# Patient Record
Sex: FEMALE | Race: WHITE | Employment: OTHER | URBAN - METROPOLITAN AREA
[De-identification: names, ages, dates, MRNs, and addresses within clinical notes are randomized per-mention and may not be internally consistent; named-entity substitution may affect disease eponyms.]

---

## 2017-05-25 ENCOUNTER — EMPLOYEE WELLNESS (OUTPATIENT)
Dept: OTHER | Age: 62
End: 2017-05-25

## 2017-05-25 LAB
CHOLESTEROL, TOTAL: 266 MG/DL (ref 0–199)
FASTING: YES
GLUCOSE BLD-MCNC: 91 MG/DL (ref 74–109)
HDLC SERPL-MCNC: 52 MG/DL (ref 40–90)
LDL CHOLESTEROL CALCULATED: 178 MG/DL
TRIGL SERPL-MCNC: 178 MG/DL (ref 0–199)

## 2017-07-18 ENCOUNTER — HOSPITAL ENCOUNTER (EMERGENCY)
Age: 62
Discharge: HOME | End: 2017-07-18
Attending: NURSE PRACTITIONER
Payer: COMMERCIAL

## 2017-07-18 VITALS
HEART RATE: 67 BPM | TEMPERATURE: 98.5 F | RESPIRATION RATE: 18 BRPM | SYSTOLIC BLOOD PRESSURE: 155 MMHG | DIASTOLIC BLOOD PRESSURE: 77 MMHG | OXYGEN SATURATION: 97 %

## 2017-07-18 DIAGNOSIS — J02.9 ACUTE PHARYNGITIS, UNSPECIFIED ETIOLOGY: Primary | ICD-10-CM

## 2017-07-18 LAB
GROUP A STREP CULTURE, REFLEX: NEGATIVE
REFLEX THROAT C + S: NORMAL

## 2017-07-18 PROCEDURE — 99213 OFFICE O/P EST LOW 20 MIN: CPT

## 2017-07-18 PROCEDURE — 87880 STREP A ASSAY W/OPTIC: CPT

## 2017-07-18 PROCEDURE — 99213 OFFICE O/P EST LOW 20 MIN: CPT | Performed by: NURSE PRACTITIONER

## 2017-07-18 PROCEDURE — 87070 CULTURE OTHR SPECIMN AEROBIC: CPT

## 2017-07-18 RX ORDER — AMOXICILLIN 500 MG/1
500 CAPSULE ORAL 3 TIMES DAILY
Qty: 30 CAPSULE | Refills: 0 | Status: SHIPPED | OUTPATIENT
Start: 2017-07-18 | End: 2017-07-28

## 2017-07-18 ASSESSMENT — ENCOUNTER SYMPTOMS
SORE THROAT: 1
WHEEZING: 0
COUGH: 0
SHORTNESS OF BREATH: 0

## 2017-07-18 ASSESSMENT — PAIN DESCRIPTION - LOCATION: LOCATION: THROAT

## 2017-07-18 ASSESSMENT — PAIN DESCRIPTION - PAIN TYPE: TYPE: ACUTE PAIN

## 2017-07-18 ASSESSMENT — PAIN SCALES - GENERAL: PAINLEVEL_OUTOF10: 5

## 2017-07-18 ASSESSMENT — PAIN DESCRIPTION - DESCRIPTORS: DESCRIPTORS: SORE

## 2017-07-19 ENCOUNTER — CARE COORDINATION (OUTPATIENT)
Dept: FAMILY MEDICINE CLINIC | Age: 62
End: 2017-07-19

## 2017-07-20 LAB — THROAT/NOSE CULTURE: NORMAL

## 2018-01-08 ENCOUNTER — ANESTHESIA (OUTPATIENT)
Dept: ENDOSCOPY | Age: 63
End: 2018-01-08
Payer: COMMERCIAL

## 2018-01-08 ENCOUNTER — ANESTHESIA EVENT (OUTPATIENT)
Dept: ENDOSCOPY | Age: 63
End: 2018-01-08
Payer: COMMERCIAL

## 2018-01-08 ENCOUNTER — HOSPITAL ENCOUNTER (OUTPATIENT)
Age: 63
Setting detail: OUTPATIENT SURGERY
Discharge: HOME | End: 2018-01-08
Attending: INTERNAL MEDICINE | Admitting: INTERNAL MEDICINE
Payer: COMMERCIAL

## 2018-01-08 VITALS
BODY MASS INDEX: 42.85 KG/M2 | WEIGHT: 273 LBS | RESPIRATION RATE: 18 BRPM | HEART RATE: 68 BPM | HEIGHT: 67 IN | DIASTOLIC BLOOD PRESSURE: 72 MMHG | SYSTOLIC BLOOD PRESSURE: 119 MMHG | OXYGEN SATURATION: 97 % | TEMPERATURE: 97.2 F

## 2018-01-08 VITALS — OXYGEN SATURATION: 96 % | SYSTOLIC BLOOD PRESSURE: 112 MMHG | DIASTOLIC BLOOD PRESSURE: 62 MMHG

## 2018-01-08 PROCEDURE — 2580000003 HC RX 258: Performed by: INTERNAL MEDICINE

## 2018-01-08 PROCEDURE — 7100000000 HC PACU RECOVERY - FIRST 15 MIN: Performed by: INTERNAL MEDICINE

## 2018-01-08 PROCEDURE — 88305 TISSUE EXAM BY PATHOLOGIST: CPT

## 2018-01-08 PROCEDURE — 6360000002 HC RX W HCPCS: Performed by: NURSE ANESTHETIST, CERTIFIED REGISTERED

## 2018-01-08 PROCEDURE — 2500000003 HC RX 250 WO HCPCS: Performed by: NURSE ANESTHETIST, CERTIFIED REGISTERED

## 2018-01-08 PROCEDURE — 3700000000 HC ANESTHESIA ATTENDED CARE: Performed by: INTERNAL MEDICINE

## 2018-01-08 PROCEDURE — 3609027000 HC COLONOSCOPY: Performed by: INTERNAL MEDICINE

## 2018-01-08 PROCEDURE — 3700000001 HC ADD 15 MINUTES (ANESTHESIA): Performed by: INTERNAL MEDICINE

## 2018-01-08 RX ORDER — SODIUM CHLORIDE 450 MG/100ML
INJECTION, SOLUTION INTRAVENOUS CONTINUOUS
Status: DISCONTINUED | OUTPATIENT
Start: 2018-01-08 | End: 2018-01-08 | Stop reason: ALTCHOICE

## 2018-01-08 RX ORDER — SODIUM CHLORIDE 450 MG/100ML
INJECTION, SOLUTION INTRAVENOUS CONTINUOUS
Status: DISCONTINUED | OUTPATIENT
Start: 2018-01-08 | End: 2018-01-08 | Stop reason: HOSPADM

## 2018-01-08 RX ORDER — 0.9 % SODIUM CHLORIDE 0.9 %
10 VIAL (ML) INJECTION PRN
Status: CANCELLED | OUTPATIENT
Start: 2018-01-08

## 2018-01-08 RX ORDER — LIDOCAINE HYDROCHLORIDE 20 MG/ML
INJECTION, SOLUTION INFILTRATION; PERINEURAL PRN
Status: DISCONTINUED | OUTPATIENT
Start: 2018-01-08 | End: 2018-01-08 | Stop reason: SDUPTHER

## 2018-01-08 RX ORDER — 0.9 % SODIUM CHLORIDE 0.9 %
10 VIAL (ML) INJECTION EVERY 12 HOURS SCHEDULED
Status: CANCELLED | OUTPATIENT
Start: 2018-01-08

## 2018-01-08 RX ORDER — PROPOFOL 10 MG/ML
INJECTION, EMULSION INTRAVENOUS PRN
Status: DISCONTINUED | OUTPATIENT
Start: 2018-01-08 | End: 2018-01-08 | Stop reason: SDUPTHER

## 2018-01-08 RX ADMIN — PROPOFOL 80 MG: 10 INJECTION, EMULSION INTRAVENOUS at 08:54

## 2018-01-08 RX ADMIN — PROPOFOL 30 MG: 10 INJECTION, EMULSION INTRAVENOUS at 08:58

## 2018-01-08 RX ADMIN — LIDOCAINE HYDROCHLORIDE 30 MG: 20 INJECTION, SOLUTION INFILTRATION; PERINEURAL at 08:50

## 2018-01-08 RX ADMIN — PROPOFOL 80 MG: 10 INJECTION, EMULSION INTRAVENOUS at 08:50

## 2018-01-08 RX ADMIN — PROPOFOL 30 MG: 10 INJECTION, EMULSION INTRAVENOUS at 09:02

## 2018-01-08 RX ADMIN — PROPOFOL 10 MG: 10 INJECTION, EMULSION INTRAVENOUS at 09:05

## 2018-01-08 RX ADMIN — SODIUM CHLORIDE: 4.5 INJECTION, SOLUTION INTRAVENOUS at 07:39

## 2018-01-08 ASSESSMENT — PAIN - FUNCTIONAL ASSESSMENT: PAIN_FUNCTIONAL_ASSESSMENT: 0-10

## 2018-01-08 NOTE — BRIEF OP NOTE
Brief Postoperative Note  ______________________________________________________________    Patient: Rommel Shabazz  YOB: 1955  MRN: [id number]  Date of Procedure: 1/8/2018    Pre-Op Diagnosis: hx colon polyp    Post-Op Diagnosis: Same       Procedure(s):  COLONOSCOPY    Anesthesia: Monitor Anesthesia Care    Surgeon(s):   Corinne Carlisle MD    Staff:  Scrub Person First: Benigno Hogan     Estimated Blood Loss: * No values recorded between 1/8/2018  8:46 AM and 1/8/2018  9:11 AM * None    Complications: None    Specimens:   ID Type Source Tests Collected by Time Destination   A :  Tissue Recto sigmoid SURGICAL PATHOLOGY Corinne Carlisle MD 1/8/2018 7279        Implants:  * No implants in log *      Drains:      Findings: above    Corinne Carlisle MD  Date: 1/8/2018  Time: 9:17 AM

## 2018-01-08 NOTE — OP NOTE
procedure was terminated. The patient tolerated the  procedure well, taken to the GI lab recovery area in stable condition. IMPRESSION:  1. Colonoscopy to cecum. Small polyps removed. 2.  Prep was good, visibility good, and greater than 6 minutes were taken  for withdrawal of the scope once sigmoid was reached. 3.  Pictures taken, pending. PLAN:  1. Await biopsy results. Will likely just need this in 5 years. 2.  Greater than 6 minutes were taken for withdrawal of the scope once  sigmoid was reached. 3.  No heavy lifting today. MISSAEL Stone M.D.    D: 01/08/2018 9:25:43       T: 01/08/2018 11:05:16     [id number]  Job#: [id number]     Doc#: [id number]    CC:  Priya Howard M.D.

## 2018-01-08 NOTE — OP NOTE
[]Other:    Lungs:  [x]Clear    []Other:    Abdomen: [x]Soft    []Other:    Mental Status: [x]Alert & Oriented  []Other:      VITAL SIGNS   Patient Vitals for the past 24 hrs:   BP Temp Temp src Pulse Resp SpO2 Height Weight   01/08/18 0729 119/71 97.2 °F (36.2 °C) Temporal 76 16 94 % 5' 7\" (1.702 m) 273 lb (123.8 kg)       PLANNED PROCEDURE   []EGD  [x]Colonoscopy []Flex Sigmoid  []ERCP []EUS   []Cystoscopy  [] CATH [] BRONCH   Consent: I have discussed with the patient and/or the patient representative the indication, alternatives, and the possible risks and/or complications of the planned procedure and the anesthesia methods. The patient and/or patient representative appear to understand and agree to proceed. SEDATION  Planned agent:[x]Midazolam []Meperidine [x]Sublimaze []Morphine  []Diazepam  []Other:     ASA Classification: Class 2 - A normal healthy patient with mild systemic disease    Airway Assessment: Mallampati Class II - (soft palate, fauces & uvula are visible)    Monitoring and Safety: The patient will be placed on a cardiac monitor and vital signs, pulse oximetry and level of consciousness will be continuously evaluated throughout the procedure. The patient will be closely monitored until recovery from the medications is complete and the patient has returned to baseline status. Respiratory therapy will be on standby during the procedure. [x]Pre-procedure diagnostic studies complete and results available. Comment:    [x]Previous sedation/anesthesia experiences assessed. Comment:    [x]The patient is an appropriate candidate to undergo the planned procedure sedation and anesthesia. (Refer to nursing sedation/analgesia documentation record)  [x]Formulation and discussion of sedation/procedure plan, risks, and expectations with patient and/or responsible adult completed. [x]Patient examined immediately prior to the procedure.  (Refer to nursing sedation/analgesia documentation record)    Crow Alba

## 2018-01-08 NOTE — ANESTHESIA PRE PROCEDURE
Department of Anesthesiology  Preprocedure Note       Name:  Marcial Mcdonald   Age:  63 y.o.  :  1955                                          MRN:  [id number]         Date:  2018      Surgeon: Pardeep Ponce): Elliot Ahumada, MD    Procedure: Procedure(s):  COLONOSCOPY    Medications prior to admission:   Prior to Admission medications    Medication Sig Start Date End Date Taking? Authorizing Provider   ibuprofen (ADVIL;MOTRIN) 800 MG tablet Take 1 tablet by mouth every 8 hours as needed for Pain for 30 doses. 13   Elio Chappell PA-C   sertraline (ZOLOFT) 100 MG tablet Take 100 mg by mouth daily. Historical Provider, MD   ValACYclovir HCl (VALTREX PO) Take  by mouth as needed. TAKES AS DIRECTED PRN     Historical Provider, MD       Current medications:    No current facility-administered medications for this encounter. Allergies:  No Known Allergies    Problem List:    Patient Active Problem List   Diagnosis Code    Heart palpitations R00.2    Obesity, mild E66.9    Depression F32.9    History of fatigue Z87.898    History of cold sores Z86.19    Vitamin D deficiency E55.9    Glucose intolerance (impaired glucose tolerance) R73.02       Past Medical History:        Diagnosis Date    Depression     Glucose intolerance (impaired glucose tolerance)     Heart palpitations     HX OF:    History of cold sores     History of fatigue     Hyperlipidemia     Obesity, mild     Vitamin D deficiency        Past Surgical History:        Procedure Laterality Date    APPENDECTOMY      COLONOSCOPY      TONSILLECTOMY      TRANSTHORACIC ECHOCARDIOGRAM  2011    EF 55-65%  NORMAL       Social History:    Social History   Substance Use Topics    Smoking status: Never Smoker    Smokeless tobacco: Not on file    Alcohol use Yes      Comment: SOCIAL                                Counseling given: Not Answered      Vital Signs (Current): There were no vitals filed for this visit.

## 2018-02-19 ENCOUNTER — TELEPHONE (OUTPATIENT)
Dept: PHARMACY | Facility: CLINIC | Age: 63
End: 2018-02-19

## 2018-02-19 NOTE — TELEPHONE ENCOUNTER
CLINICAL PHARMACY CONSULT: Prior Authorization Request    Referred to clinical pharmacy specialist from [address] -  identified with current non-formulary request for Myrbetriq.  formulary tier: 2, requires step therapy, trial of oxybutynin. Alternative(s):   Oxybutynin    Called physician office and informed of step therapy.   I note claim for oxybutynin in [employer].    ==============================================  For Pharmacy Admin Tracking Only    PHSO: Yes  Total # of Interventions Recommended: 1  - New Order #: 1 New Medication Order Reason(s): Cost Conversion  Recommended intervention potential cost savings: 3233.60  Accepted intervention potential cost savings: 3233.60  Total Interventions Accepted: 1  Time Spent (min): [address], [address] [place] [address]

## 2018-03-20 VITALS — WEIGHT: 263 LBS | BODY MASS INDEX: 39.99 KG/M2

## 2018-05-12 ENCOUNTER — EMPLOYEE WELLNESS (OUTPATIENT)
Dept: OTHER | Age: 63
End: 2018-05-12

## 2018-05-12 LAB
CHOLESTEROL, TOTAL: 290 MG/DL (ref 0–199)
FASTING: YES
GLUCOSE BLD-MCNC: 98 MG/DL (ref 74–109)
HDLC SERPL-MCNC: 59 MG/DL (ref 40–90)
LDL CHOLESTEROL CALCULATED: 200 MG/DL
TRIGL SERPL-MCNC: 157 MG/DL (ref 0–199)

## 2018-05-21 VITALS — WEIGHT: 280 LBS | BODY MASS INDEX: 43.85 KG/M2

## 2019-05-01 ENCOUNTER — OFFICE VISIT (OUTPATIENT)
Dept: FAMILY MEDICINE CLINIC | Age: 64
End: 2019-05-01
Payer: COMMERCIAL

## 2019-05-01 VITALS
RESPIRATION RATE: 16 BRPM | SYSTOLIC BLOOD PRESSURE: 114 MMHG | DIASTOLIC BLOOD PRESSURE: 86 MMHG | HEIGHT: 68 IN | HEART RATE: 68 BPM | TEMPERATURE: 98.1 F | WEIGHT: 279.8 LBS | BODY MASS INDEX: 42.41 KG/M2

## 2019-05-01 DIAGNOSIS — H69.83 DYSFUNCTION OF BOTH EUSTACHIAN TUBES: Primary | ICD-10-CM

## 2019-05-01 DIAGNOSIS — R42 DIZZINESS: ICD-10-CM

## 2019-05-01 PROCEDURE — 99213 OFFICE O/P EST LOW 20 MIN: CPT | Performed by: FAMILY MEDICINE

## 2019-05-01 RX ORDER — CEPHALEXIN 500 MG/1
500 CAPSULE ORAL 2 TIMES DAILY
Qty: 20 CAPSULE | Refills: 0 | Status: SHIPPED | OUTPATIENT
Start: 2019-05-01 | End: 2019-05-11

## 2019-05-01 RX ORDER — MECLIZINE HYDROCHLORIDE 25 MG/1
25 TABLET ORAL 3 TIMES DAILY PRN
Qty: 30 TABLET | Refills: 0 | Status: SHIPPED | OUTPATIENT
Start: 2019-05-01 | End: 2019-05-11

## 2019-05-01 ASSESSMENT — ENCOUNTER SYMPTOMS
SORE THROAT: 0
CHANGE IN BOWEL HABIT: 0
ABDOMINAL PAIN: 0
EYE PAIN: 0
NAUSEA: 1
VOMITING: 0
CONSTIPATION: 0
SWOLLEN GLANDS: 0
CHEST TIGHTNESS: 0
COUGH: 0
RHINORRHEA: 0
WHEEZING: 0
VISUAL CHANGE: 0
SHORTNESS OF BREATH: 0
BLOOD IN STOOL: 0
DIARRHEA: 0
BACK PAIN: 0

## 2019-05-01 NOTE — PROGRESS NOTES
to light. EOM are normal.   Neck: Neck supple. No thyromegaly present. Cardiovascular: Normal rate, regular rhythm and normal heart sounds. Pulmonary/Chest: Breath sounds normal. She has no wheezes. She has no rales. Abdominal: Soft. Bowel sounds are normal. There is no tenderness. There is no rebound and no guarding. Musculoskeletal: Normal range of motion. She exhibits no edema. Lymphadenopathy:     She has no cervical adenopathy. Neurological: She is alert and oriented to person, place, and time. She has normal reflexes. No cranial nerve deficit. Skin: Skin is warm and dry. No rash noted. Psychiatric: She has a normal mood and affect. Nursing note and vitals reviewed.       Assessment:      Eustachian tube dysfunction      Plan:      Keflex 500 mg BID x 10 days  Antivert 25 mg for dizziness  Work slip        Josette Sheehan MD

## 2019-05-01 NOTE — LETTER
[address] [address]  Phone: [phone]  Fax: [phone]    Julian Bhatt MD        May 1, 2019     Patient: Rebecca Roberto   YOB: 1955   Date of Visit: 5/1/2019       To Whom It May Concern: It is my medical opinion that Rebecca Roberto may return to work on 05/07/2019. Off work 05/01/2019 and 05/02/2019 for eustanian tube dysfunction and dizziness. If you have any questions or concerns, please don't hesitate to call.     Sincerely,        Julian Bhatt MD

## 2019-05-01 NOTE — PATIENT INSTRUCTIONS
Patient Education        Dizziness: Care Instructions  Your Care Instructions  Dizziness is the feeling of unsteadiness or fuzziness in your head. It is different than having vertigo, which is a feeling that the room is spinning or that you are moving or falling. It is also different from lightheadedness, which is the feeling that you are about to faint. It can be hard to know what causes dizziness. Some people feel dizzy when they have migraine headaches. Sometimes bouts of flu can make you feel dizzy. Some medical conditions, such as heart problems or high blood pressure, can make you feel dizzy. Many medicines can cause dizziness, including medicines for high blood pressure, pain, or anxiety. If a medicine causes your symptoms, your doctor may recommend that you stop or change the medicine. If it is a problem with your heart, you may need medicine to help your heart work better. If there is no clear reason for your symptoms, your doctor may suggest watching and waiting for a while to see if the dizziness goes away on its own. Follow-up care is a key part of your treatment and safety. Be sure to make and go to all appointments, and call your doctor if you are having problems. It's also a good idea to know your test results and keep a list of the medicines you take. How can you care for yourself at home? · If your doctor recommends or prescribes medicine, take it exactly as directed. Call your doctor if you think you are having a problem with your medicine. · Do not drive while you feel dizzy. · Try to prevent falls. Steps you can take include:  ? Using nonskid mats, adding grab bars near the tub, and using night-lights. ? Clearing your home so that walkways are free of anything you might trip on.  ? Letting family and friends know that you have been feeling dizzy. This will help them know how to help you. When should you call for help? Call 911 anytime you think you may need emergency care.  For example, call if:    · You passed out (lost consciousness).     · You have dizziness along with symptoms of a heart attack. These may include:  ? Chest pain or pressure, or a strange feeling in the chest.  ? Sweating. ? Shortness of breath. ? Nausea or vomiting. ? Pain, pressure, or a strange feeling in the back, neck, jaw, or upper belly or in one or both shoulders or arms. ? Lightheadedness or sudden weakness. ? A fast or irregular heartbeat.     · You have symptoms of a stroke. These may include:  ? Sudden numbness, tingling, weakness, or loss of movement in your face, arm, or leg, especially on only one side of your body. ? Sudden vision changes. ? Sudden trouble speaking. ? Sudden confusion or trouble understanding simple statements. ? Sudden problems with walking or balance. ? A sudden, severe headache that is different from past headaches.    Call your doctor now or seek immediate medical care if:    · You feel dizzy and have a fever, headache, or ringing in your ears.     · You have new or increased nausea and vomiting.     · Your dizziness does not go away or comes back.    Watch closely for changes in your health, and be sure to contact your doctor if:    · You do not get better as expected. Where can you learn more? Go to [email or web address]://Crono.Chill.com. org and sign in to your [employer] account. Enter O677 in the [employer] box to learn more about \"Dizziness: Care Instructions. \"     If you do not have an account, please click on the \"Sign Up Now\" link. Current as of: September 23, 2018  Content Version: 11.9  © 1227-4423 [employer]. Care instructions adapted under license by [facility] ([facility]). If you have questions about a medical condition or this instruction, always ask your healthcare professional. [place] [address] any warranty or liability for your use of this information.          Patient Education        Vertigo: Care Instructions  Your Care Instructions    Vertigo is the feeling that you or your surroundings are moving when there is no actual movement. It is often described as a feeling of spinning, whirling, falling, or tilting. Vertigo may make you vomit or feel nauseated. You may have trouble standing or walking and may lose your balance. Vertigo is often related to an inner ear problem, but it can have other more serious causes. If vertigo continues, you may need more tests to find its cause. Follow-up care is a key part of your treatment and safety. Be sure to make and go to all appointments, and call your doctor if you are having problems. It's also a good idea to know your test results and keep a list of the medicines you take. How can you care for yourself at home? · Do not lie flat on your back. Prop yourself up slightly. This may reduce the spinning feeling. Keep your eyes open. · Move slowly so that you do not fall. · If your doctor recommends medicine, take it exactly as directed. · Do not drive while you are having vertigo. Certain exercises, called Jenkins-Daroff exercises, can help decrease vertigo. To do Jenkins-Daroff exercises:  · Sit on the edge of a bed or sofa and quickly lie down on the side that causes the worst vertigo. Lie on your side with your ear down. · Stay in this position for at least 30 seconds or until the vertigo goes away. · Sit up. If this causes vertigo, wait for it to stop. · Repeat the procedure on the other side. · Repeat this 10 times. Do these exercises 2 times a day until the vertigo is gone. When should you call for help? Call 911 anytime you think you may need emergency care. For example, call if:    · You passed out (lost consciousness).     · You have symptoms of a stroke. These may include:  ? Sudden numbness, tingling, weakness, or loss of movement in your face, arm, or leg, especially on only one side of your body. ? Sudden vision changes. ? Sudden trouble speaking.   ? Sudden confusion or trouble understanding simple statements. ? Sudden problems with walking or balance. ? A sudden, severe headache that is different from past headaches.    Call your doctor now or seek immediate medical care if:    · Vertigo occurs with a fever, a headache, or ringing in your ears.     · You have new or increased nausea and vomiting.    Watch closely for changes in your health, and be sure to contact your doctor if:    · Vertigo gets worse or happens more often.     · Vertigo has not gotten better after 2 weeks. Where can you learn more? Go to https://SopogypePoachableeb.Provenance. org and sign in to your [employer] account. Enter X724 in the [employer] box to learn more about \"Vertigo: Care Instructions. \"     If you do not have an account, please click on the \"Sign Up Now\" link. Current as of: March 27, 2018  Content Version: 11.9  © 4165-6112 lifeaction games, Incorporated. Care instructions adapted under license by [facility] ([facility]). If you have questions about a medical condition or this instruction, always ask your healthcare professional. [address] any warranty or liability for your use of this information.

## 2019-05-02 ENCOUNTER — TELEPHONE (OUTPATIENT)
Dept: FAMILY MEDICINE CLINIC | Age: 64
End: 2019-05-02

## 2019-05-02 DIAGNOSIS — R42 DIZZINESS: Primary | ICD-10-CM

## 2019-05-02 NOTE — TELEPHONE ENCOUNTER
Patient states that the dizziness is not getting any better and her mother is coming home from the hospital next week and she wants to be able to help her. She would like to go ahead and be set up with vestibular therapy at [facility].   Please call her back at [phone]

## 2019-05-06 ENCOUNTER — HOSPITAL ENCOUNTER (OUTPATIENT)
Dept: PHYSICAL THERAPY | Age: 64
Setting detail: THERAPIES SERIES
Discharge: HOME | End: 2019-05-06
Payer: COMMERCIAL

## 2019-05-06 PROCEDURE — 97161 PT EVAL LOW COMPLEX 20 MIN: CPT

## 2019-05-06 PROCEDURE — 95992 CANALITH REPOSITIONING PROC: CPT

## 2019-05-06 NOTE — PROGRESS NOTES
** PLEASE SIGN, DATE AND TIME CERTIFICATION BELOW AND RETURN TO [facility] OUTPATIENT REHABILITATION (FAX #: [phone]). ATTEST/CO-SIGN IF ACCESSING VIA [employer]. THANK YOU.**    I certify that I have examined the patient below and determined that Physical Medicine and Rehabilitation service is necessary and that I approve the established plan of care for up to 90 days or as specifically noted. Attestation, signature or co-signature of physician indicates approval of certification requirements.    ________________________ ____________ __________  Physician Signature   Date   Time       [address]     Time In: 0930  Time Out: 1005  Minutes: 35    Date: 2019  Patient Name: Bryan Strauss,  Gender:  female        CSN: [id number]   : 1955  (66 y.o.)        Referring Practitioner: Elaine Horne MD      Diagnosis: Dizziness R42  Treatment Diagnosis: BPPV          General:  PT Visit Information  Onset Date: 19  PT Insurance Information: Medical [place]- no visit limit, aquatics and modalities covered  Total # of Visits to Date: 1  Plan of Care/Certification Expiration Date: 19       See Medical History Questionnaire for information related to allergies and medications. Subjective:  Chart Reviewed: Yes  Patient assessed for rehabilitation services?: Yes  Comments: No follow up scheduled with Dr. Silvana Posadas. Other (Comment): Vestibular therapy referral     Subjective: Pt reports 8-9 years ago, she slipped on ice and hit head causing vertigo, and went through about 12 sessions of vestibular rehab. Last week Tuesday, she got up out of bed and felt dizzy and had to hang onto the wall to walk. Dizziness is better but still has a \"fussiness\" feeling, and off balance. Pt was prescribed Keflex but that isn't helping with symptoms. Dizziness occurs with quick movements, looking up and bending over.  Pt denies neck pain, recent ear or sinus infections. Pain:  Patient Currently in Pain: No        Social/Functional History:    Lives With: Spouse  Type of Home: House   IADL Comments: Patients mother is ill and coming home from rehab with hospice, so battling emotional stress. 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 VOR normal. Oculomotor exma normal.           Overall Sensation Status: WNL           Rolling to Left: Independent  Rolling to Right: Independent  Supine to Sit: Independent  Sit to Supine: Independent                Transfers  Sit to Stand: Modified independent  Stand to sit: Modified independent            Ambulation 1  Surface: carpet  Device: No Device  Assistance: Independent  Quality of Gait: straight path, steady, equal step length  Distance: 40ft  Comments: Walked 20ft with horizontal head turns with mild scissoring and path deviation, 20ft looking up/down with less scissoring but c/o dizziness looking up        Balance  Single Leg Stance R Leg: (x8 sec with mild sway)  Single Leg Stance L Leg: (x7 sec with mild sway)           Exercises  Exercise 1: [place] Hallpike: positive left for pt report of dizziness, no nystagmus, intensity 3/5; positive to right for report of dizziness with torsional nystagmus, intensity 4/5  Exercise 2: Roll test: negative to left; positive to right with mild nystagmus  Exercise 3: Treated with Epley maneuever for right posterior canalithiasis.  Rechecked [employer] and roll test to right post treatment and no symptoms. Exercise 4: Educated pt on 24 hour vestibular precautions. Dizziness Handicap Inventory   1. Does looking up increase your problem? 4 - Always   2. Because of your problem, do you feel frustrated? 4 - Always   3. Because of your problem, do you restrict your travel for business or pleasure? 2 - Sometimes   4. Does walking down the aisle of a supermarket increase your problem? 0 - No   5. Because of your problem, do you have difficulty getting into or out of bed? 0 - No   6. Does your problem significantly restrict your participation in social activities, such as going out to dinner, going to movies, dancing or to parties? 0 - No   7. Because of your problem, do you have difficulty reading? 0 - No   8. Does performing more ambitious activities like sports, dancing, and household chores, such as sweeping or putting dishes away increase your problem? 4 - Always   9. Because of your problem, are you afraid to leave your home without having someone accompany you? 0 - No   10. Because of your problem, have you been embarrassed in front of others? 0 - No   11. Do quick head movements of your head increase your problem? 4 - Always   12. Because of your problem, do you avoid heights? 0 - No   13. Does turning over in bed increase your problem? 2 - Sometimes   14. Because of your problem, is it difficult for you to do strenuous housework or yard work? 0 - No   15. Because of your problem, are you afraid people may think that you are intoxicated? 0 - No   16. Because of your problem, is it difficult for you to go for a walk by yourself? 0 - No   17. Does walking down a sidewalk increase your problem? 0 - No   18. Because of your problem, is it difficult for you to concentrate? 2 - Sometimes   19. Because of your problem, is it difficult for you to walk around your house in the dark? 0 - No   20. Because of your problem, are you afraid to stay home alone? 0 - No   21.  Because of your problem, do you feel expectations for measurable functional outcomes. Evaluation Complexity: Based on the findings of patient history, examination, clinical presentation, and decision making during this evaluation, the evaluation of [facility]  is of low complexity. Goals:  Patient goals : Get rid of dizziness    Short term goals  Time Frame for Short term goals: see LTGs d/t short expected plan duration    Long term goals  Time Frame for Long term goals : 6 weeks  Long term goal 1: Pt will have negative [place] Hallpike and roll test B to get out of bed without dizziness. Long term goal 2: Pt will decrease Dizziness Handicap inventory score from 30/100 to 10/100. Long term goal 3: Pt will reports 90% resolution of dizziness and fussiness to tolerate quick head movements, looking up and bending over for functional tasks.      Erik Clayton, PT

## 2019-05-08 ENCOUNTER — HOSPITAL ENCOUNTER (OUTPATIENT)
Dept: PHYSICAL THERAPY | Age: 64
Setting detail: THERAPIES SERIES
Discharge: HOME | End: 2019-05-08
Payer: COMMERCIAL

## 2019-05-08 PROCEDURE — 95992 CANALITH REPOSITIONING PROC: CPT

## 2019-05-08 NOTE — PROGRESS NOTES
[facility]     Time In: 908  Time Out: 1963  Minutes: 8    Date: 2019  Patient Name: Jaida Nguyen,  Gender:  female        CSN: [id number]   : 1955  (60 y.o.)       Referring Practitioner: Hitesh Patel MD      Diagnosis: Dizziness R42  Treatment Diagnosis: BPPV           General:  PT Visit Information  Onset Date: 19  PT Insurance Information: Medical [place]- no visit limit, aquatics and modalities covered  Total # of Visits to Date: 2  Plan of Care/Certification Expiration Date: 19               Subjective:  Chart Reviewed: Yes  Comments: No follow up scheduled with Dr. Muna Cleary. Other (Comment): Vestibular therapy referral     Subjective: Pt reports feeling nauseous after treatment Monday, but yesterday felt better and today only c/o a little fussiness. Pain:  Patient Currently in Pain: No         Objective         Exercises  Exercise 1: Theo Hallpike: positive to right with torsional nystagmus lasting 15-20 sec  Exercise 3: Treated with Epley maneuever for right posterior canalithiasis, holding 1 min after resolution of symptoms -mild dizziness rolling to left and upon sitting up  Exercise 4: Reviewed 24 hour vestibular precautions. Activity Tolerance:  Activity Tolerance: Patient Tolerated treatment well    Assessment: Body structures, Functions, Activity limitations: Vestibular Impairment  Assessment: Theo Hallpike positive to right again and treated with Epley maneuever. Significant torsional nystagmus noted with Sandrea Marts. Nystagmus lasted shorted duration with 2nd Sandrea Marts- had to perform d/t fire drill interrupting treatment. Prognosis: Good       Patient Education:  Patient Education: 24 hour vestibular precautions and monitor symptoms.      Plan:  Times per week: 2x  Plan weeks: 6 weeks  Specific instructions for Next Treatment: reassess BPPV, treat appropriately  Current Treatment Recommendations: Vestibular Rehab, Home Exercise Program, Patient/Caregiver Education & Training  Plan Comment: Continue per current POC. Goals:  Patient goals : Get rid of dizziness    Short term goals  Time Frame for Short term goals: see LTGs d/t short expected plan duration    Long term goals  Time Frame for Long term goals : 6 weeks  Long term goal 1: Pt will have negative [place] Hallpike and roll test B to get out of bed without dizziness. Long term goal 2: Pt will decrease Dizziness Handicap inventory score from 30/100 to 10/100. Long term goal 3: Pt will reports 90% resolution of dizziness and fussiness to tolerate quick head movements, looking up and bending over for functional tasks.      Zoila Patel, PT

## 2019-05-13 ENCOUNTER — HOSPITAL ENCOUNTER (OUTPATIENT)
Dept: PHYSICAL THERAPY | Age: 64
Setting detail: THERAPIES SERIES
Discharge: HOME | End: 2019-05-13
Payer: COMMERCIAL

## 2019-05-13 PROCEDURE — 95992 CANALITH REPOSITIONING PROC: CPT

## 2019-05-13 NOTE — PROGRESS NOTES
[place]     Time In: 2432  Time Out: 8448  Minutes: 10    Date: 2019  Patient Name: Rasheed Crenshaw,  Gender:  female        CSN: [id number]   : 1955  (60 y.o.)       Referring Practitioner: Anastacia Araiza MD      Diagnosis: Dizziness R42  Treatment Diagnosis: BPPV      General:  PT Visit Information  Onset Date: 19  PT Insurance Information: Medical [place]- no visit limit, aquatics and modalities covered  Total # of Visits to Date: 3  Plan of Care/Certification Expiration Date: 19               Subjective:  Chart Reviewed: Yes  Comments: No follow up scheduled with Dr. Kimberli Diamond. Other (Comment): Vestibular therapy referral     Subjective: Pt reports feeling good yesterday without dizziness. Today pt c/o fussiness in sinus region but no real dizziness. Pt reports symptoms better if she log rolls to get out of bed. Pain:  Patient Currently in Pain: No         Objective         Exercises  Exercise 1: Theo Hallpike: positive to right with slow torsional nystagmus lasting 15-20 sec  Exercise 3: Treated with Epley maneuever for right posterior canalithiasis, holding 1 min after resolution of symptoms -mild dizziness rolling to left  Exercise 4: Reviewed 24 hour vestibular precautions. Activity Tolerance:  Activity Tolerance: Patient Tolerated treatment well    Assessment: Body structures, Functions, Activity limitations: Vestibular Impairment  Assessment: [place] Hallpike positive to right again, but less intense, so treated with Epley manuever with good tolerance and only mild dizziness rolling to left, none upon sitting up. Prognosis: Good       Patient Education:  Patient Education: 24 hour vestibular precautions and monitor symptoms.           Plan:  Times per week: 2x  Plan weeks: 6 weeks  Specific instructions for Next Treatment: reassess BPPV, treat appropriately  Current Treatment Recommendations: Vestibular Rehab, Home Exercise Program, Patient/Caregiver Education & Training  Plan Comment: Continue per current POC. Goals:  Patient goals : Get rid of dizziness    Short term goals  Time Frame for Short term goals: see LTGs d/t short expected plan duration    Long term goals  Time Frame for Long term goals : 6 weeks  Long term goal 1: Pt will have negative Theo Hallpike and roll test B to get out of bed without dizziness. Long term goal 2: Pt will decrease Dizziness Handicap inventory score from 30/100 to 10/100. Long term goal 3: Pt will reports 90% resolution of dizziness and fussiness to tolerate quick head movements, looking up and bending over for functional tasks.      Alexandre Shane, PT

## 2019-05-16 ENCOUNTER — HOSPITAL ENCOUNTER (OUTPATIENT)
Dept: PHYSICAL THERAPY | Age: 64
Setting detail: THERAPIES SERIES
Discharge: HOME | End: 2019-05-16
Payer: COMMERCIAL

## 2019-05-16 PROCEDURE — 95992 CANALITH REPOSITIONING PROC: CPT

## 2019-05-16 NOTE — PROGRESS NOTES
[place]     Time In: 0900  Time Out: 8985  Minutes: 12    Date: 2019  Patient Name: Bryan Strauss,  Gender:  female        CSN: [id number]   : 1955  (60 y.o.)       Referring Practitioner: Elaine Horne MD      Diagnosis: Dizziness R42  Treatment Diagnosis: BPPV           General:  PT Visit Information  Onset Date: 19  PT Insurance Information: Medical [place]- no visit limit, aquatics and modalities covered  Total # of Visits to Date: 4  Plan of Care/Certification Expiration Date: 19               Subjective:  Chart Reviewed: Yes  Comments: No follow up scheduled with Dr. Silvana Posadas. Other (Comment): Vestibular therapy referral     Subjective: Pt reports she felt good yesterday and when she got up this morning. However, she bent over to get dressed and had to catch herself and is dizzy now. Pain:  Patient Currently in Pain: No         Objective         Exercises  Exercise 1: Theo Hallpike: positive to right for report of dizziness, no nystagmus, negative to left  Exercise 2: Roll test: negative to right; positive to left for report of dizziness but less intense than right [employer]. Exercise 3: Treated with Epley maneuever for right posterior canalithiasis, holding 1 min after resolution of symptoms -mild dizziness rolling to left  Exercise 4: Reviewed 24 hour vestibular precautions. Activity Tolerance:  Activity Tolerance: Patient Tolerated treatment well    Assessment: Body structures, Functions, Activity limitations: Vestibular Impairment  Assessment: Pt continues to have positive right [employer], with a positive left roll test but less tense. Treated with Epley maneuver for right posterior canalithiasis. No nystagmus noted during testing today. Prognosis: Good       Patient Education:  Patient Education: 24 hour vestibular precautions and monitor symptoms. Plan:  Times per week: 2x  Plan weeks: 6 weeks  Specific instructions for Next Treatment: reassess BPPV, treat appropriately  Current Treatment Recommendations: Vestibular Rehab, Home Exercise Program, Patient/Caregiver Education & Training  Plan Comment: Continue per current POC. Goals:  Patient goals : Get rid of dizziness    Short term goals  Time Frame for Short term goals: see LTGs d/t short expected plan duration    Long term goals  Time Frame for Long term goals : 6 weeks  Long term goal 1: Pt will have negative [place] Hallpike and roll test B to get out of bed without dizziness. Long term goal 2: Pt will decrease Dizziness Handicap inventory score from 30/100 to 10/100. Long term goal 3: Pt will reports 90% resolution of dizziness and fussiness to tolerate quick head movements, looking up and bending over for functional tasks.      Luzmaria Faria, PT

## 2019-05-17 ENCOUNTER — APPOINTMENT (OUTPATIENT)
Dept: PHYSICAL THERAPY | Age: 64
End: 2019-05-17
Payer: COMMERCIAL

## 2019-05-17 NOTE — PROGRESS NOTES
[address]  PHYSICAL THERAPY MISSED TREATMENT NOTE  OUTPATIENT REHABILITATION    Date: 2019  Patient Name: Emanuel Victoria        MRN: [id number]   : 1955  (66 y.o.)  Gender: female   Referring Practitioner: Francisco Gallardo MD  Diagnosis: Dizziness R42    PT Visit Information  Canceled Appointment: 1    REASON FOR MISSED TREATMENT:  Cancelled d/t water covering roadways.       Selena Wagner DPT, #[id number]

## 2019-05-22 ENCOUNTER — HOSPITAL ENCOUNTER (OUTPATIENT)
Dept: PHYSICAL THERAPY | Age: 64
Setting detail: THERAPIES SERIES
Discharge: HOME | End: 2019-05-22
Payer: COMMERCIAL

## 2019-05-24 ENCOUNTER — EMPLOYEE WELLNESS (OUTPATIENT)
Dept: OTHER | Age: 64
End: 2019-05-24

## 2019-05-24 LAB
CHOLESTEROL, TOTAL: 309 MG/DL (ref 0–199)
FASTING: YES
GLUCOSE BLD-MCNC: 95 MG/DL (ref 74–109)
HDLC SERPL-MCNC: 58 MG/DL (ref 40–90)
LDL CHOLESTEROL CALCULATED: 222 MG/DL
TRIGL SERPL-MCNC: 143 MG/DL (ref 0–199)

## 2019-05-28 VITALS — WEIGHT: 278 LBS | BODY MASS INDEX: 42.27 KG/M2

## 2019-06-04 NOTE — DISCHARGE SUMMARY
[facility]    Patient Name: Rodrigo Shultz        CSN: [id number]   YOB: 1955  Gender: female  Referring Practitioner: Jen Morales MD  Diagnosis: Dizziness R42    Patient is discharged from Physical Therapy services at this time. See last note for details related to results of therapy and goal achievement. Reason for discharge:  Pt was placed on hold for 2 weeks to monitor vertigo. Upon discussion with patient, vertigo is resolved and no further PT warranted. Pt discharged from PT on 6/4/19 at 0851.        Hazel Crawley DPT, #[id number]

## 2019-07-17 ENCOUNTER — HOSPITAL ENCOUNTER (OUTPATIENT)
Dept: WOMENS IMAGING | Age: 64
Discharge: HOME | End: 2019-07-17
Payer: COMMERCIAL

## 2019-07-17 DIAGNOSIS — Z12.31 VISIT FOR SCREENING MAMMOGRAM: ICD-10-CM

## 2019-07-17 PROCEDURE — 77063 BREAST TOMOSYNTHESIS BI: CPT

## 2019-08-14 ENCOUNTER — HOSPITAL ENCOUNTER (EMERGENCY)
Age: 64
Discharge: HOME | End: 2019-08-14
Attending: EMERGENCY MEDICINE
Payer: COMMERCIAL

## 2019-08-14 ENCOUNTER — NURSE TRIAGE (OUTPATIENT)
Dept: OTHER | Facility: CLINIC | Age: 64
End: 2019-08-14

## 2019-08-14 ENCOUNTER — APPOINTMENT (OUTPATIENT)
Dept: GENERAL RADIOLOGY | Age: 64
End: 2019-08-14
Payer: COMMERCIAL

## 2019-08-14 ENCOUNTER — APPOINTMENT (OUTPATIENT)
Dept: CT IMAGING | Age: 64
End: 2019-08-14
Payer: COMMERCIAL

## 2019-08-14 VITALS
SYSTOLIC BLOOD PRESSURE: 148 MMHG | HEART RATE: 70 BPM | RESPIRATION RATE: 17 BRPM | OXYGEN SATURATION: 97 % | TEMPERATURE: 98.1 F | HEIGHT: 67 IN | BODY MASS INDEX: 43.95 KG/M2 | WEIGHT: 280 LBS | DIASTOLIC BLOOD PRESSURE: 87 MMHG

## 2019-08-14 DIAGNOSIS — I10 PRIMARY HYPERTENSION: Primary | ICD-10-CM

## 2019-08-14 LAB
ALBUMIN SERPL-MCNC: 4.5 G/DL (ref 3.5–5.1)
ALP BLD-CCNC: 75 U/L (ref 38–126)
ALT SERPL-CCNC: 15 U/L (ref 11–66)
ANION GAP SERPL CALCULATED.3IONS-SCNC: 16 MEQ/L (ref 8–16)
AST SERPL-CCNC: 16 U/L (ref 5–40)
BACTERIA: ABNORMAL /HPF
BASOPHILS # BLD: 0.4 %
BASOPHILS ABSOLUTE: 0 THOU/MM3 (ref 0–0.1)
BILIRUB SERPL-MCNC: 0.3 MG/DL (ref 0.3–1.2)
BILIRUBIN URINE: NEGATIVE
BLOOD, URINE: ABNORMAL
BUN BLDV-MCNC: 19 MG/DL (ref 7–22)
CALCIUM SERPL-MCNC: 9.6 MG/DL (ref 8.5–10.5)
CASTS 2: ABNORMAL /LPF
CASTS UA: ABNORMAL /LPF
CHARACTER, URINE: CLEAR
CHLORIDE BLD-SCNC: 98 MEQ/L (ref 98–111)
CO2: 25 MEQ/L (ref 23–33)
COLOR: YELLOW
CREAT SERPL-MCNC: 0.8 MG/DL (ref 0.4–1.2)
CRYSTALS, UA: ABNORMAL
EKG ATRIAL RATE: 74 BPM
EKG P AXIS: 49 DEGREES
EKG P-R INTERVAL: 172 MS
EKG Q-T INTERVAL: 396 MS
EKG QRS DURATION: 80 MS
EKG QTC CALCULATION (BAZETT): 439 MS
EKG R AXIS: -3 DEGREES
EKG T AXIS: 23 DEGREES
EKG VENTRICULAR RATE: 74 BPM
EOSINOPHIL # BLD: 0.8 %
EOSINOPHILS ABSOLUTE: 0.1 THOU/MM3 (ref 0–0.4)
EPITHELIAL CELLS, UA: ABNORMAL /HPF
ERYTHROCYTE [DISTWIDTH] IN BLOOD BY AUTOMATED COUNT: 11.9 % (ref 11.5–14.5)
ERYTHROCYTE [DISTWIDTH] IN BLOOD BY AUTOMATED COUNT: 41.6 FL (ref 35–45)
GFR SERPL CREATININE-BSD FRML MDRD: 73 ML/MIN/1.73M2
GLUCOSE BLD-MCNC: 102 MG/DL (ref 70–108)
GLUCOSE URINE: NEGATIVE MG/DL
HCT VFR BLD CALC: 43.2 % (ref 37–47)
HEMOGLOBIN: 14.1 GM/DL (ref 12–16)
IMMATURE GRANS (ABS): 0.01 THOU/MM3 (ref 0–0.07)
IMMATURE GRANULOCYTES: 0 %
KETONES, URINE: NEGATIVE
LEUKOCYTE ESTERASE, URINE: ABNORMAL
LYMPHOCYTES # BLD: 20.1 %
LYMPHOCYTES ABSOLUTE: 1.5 THOU/MM3 (ref 1–4.8)
MCH RBC QN AUTO: 31.4 PG (ref 26–33)
MCHC RBC AUTO-ENTMCNC: 32.6 GM/DL (ref 32.2–35.5)
MCV RBC AUTO: 96.2 FL (ref 81–99)
MISCELLANEOUS 2: ABNORMAL
MONOCYTES # BLD: 7.7 %
MONOCYTES ABSOLUTE: 0.6 THOU/MM3 (ref 0.4–1.3)
NITRITE, URINE: NEGATIVE
NUCLEATED RED BLOOD CELLS: 0 /100 WBC
OSMOLALITY CALCULATION: 280 MOSMOL/KG (ref 275–300)
PH UA: 5.5 (ref 5–9)
PLATELET # BLD: 251 THOU/MM3 (ref 130–400)
PMV BLD AUTO: 9.7 FL (ref 9.4–12.4)
POTASSIUM SERPL-SCNC: 3.9 MEQ/L (ref 3.5–5.2)
PRO-BNP: 63.9 PG/ML (ref 0–900)
PROTEIN UA: NEGATIVE
RBC # BLD: 4.49 MILL/MM3 (ref 4.2–5.4)
RBC URINE: ABNORMAL /HPF
RENAL EPITHELIAL, UA: ABNORMAL
SEG NEUTROPHILS: 70.9 %
SEGMENTED NEUTROPHILS ABSOLUTE COUNT: 5.3 THOU/MM3 (ref 1.8–7.7)
SODIUM BLD-SCNC: 139 MEQ/L (ref 135–145)
SPECIFIC GRAVITY, URINE: 1.02 (ref 1–1.03)
TOTAL PROTEIN: 7.3 G/DL (ref 6.1–8)
TROPONIN T: < 0.01 NG/ML
UROBILINOGEN, URINE: 0.2 EU/DL (ref 0–1)
WBC # BLD: 7.5 THOU/MM3 (ref 4.8–10.8)
WBC UA: ABNORMAL /HPF
YEAST: ABNORMAL

## 2019-08-14 PROCEDURE — 70450 CT HEAD/BRAIN W/O DYE: CPT

## 2019-08-14 PROCEDURE — 71045 X-RAY EXAM CHEST 1 VIEW: CPT

## 2019-08-14 PROCEDURE — 83880 ASSAY OF NATRIURETIC PEPTIDE: CPT

## 2019-08-14 PROCEDURE — 36415 COLL VENOUS BLD VENIPUNCTURE: CPT

## 2019-08-14 PROCEDURE — 6370000000 HC RX 637 (ALT 250 FOR IP): Performed by: EMERGENCY MEDICINE

## 2019-08-14 PROCEDURE — 81001 URINALYSIS AUTO W/SCOPE: CPT

## 2019-08-14 PROCEDURE — 85025 COMPLETE CBC W/AUTO DIFF WBC: CPT

## 2019-08-14 PROCEDURE — 84484 ASSAY OF TROPONIN QUANT: CPT

## 2019-08-14 PROCEDURE — 93005 ELECTROCARDIOGRAM TRACING: CPT | Performed by: EMERGENCY MEDICINE

## 2019-08-14 PROCEDURE — 80053 COMPREHEN METABOLIC PANEL: CPT

## 2019-08-14 PROCEDURE — 99285 EMERGENCY DEPT VISIT HI MDM: CPT

## 2019-08-14 RX ORDER — HYDROCHLOROTHIAZIDE 25 MG/1
25 TABLET ORAL EVERY MORNING
Qty: 30 TABLET | Refills: 0 | Status: SHIPPED | OUTPATIENT
Start: 2019-08-14 | End: 2019-08-19

## 2019-08-14 RX ORDER — LISINOPRIL 10 MG/1
10 TABLET ORAL DAILY
Qty: 30 TABLET | Refills: 0 | Status: SHIPPED | OUTPATIENT
Start: 2019-08-14 | End: 2019-08-19 | Stop reason: SDUPTHER

## 2019-08-14 RX ORDER — HYDROCHLOROTHIAZIDE 25 MG/1
25 TABLET ORAL ONCE
Status: COMPLETED | OUTPATIENT
Start: 2019-08-14 | End: 2019-08-14

## 2019-08-14 RX ORDER — LISINOPRIL 10 MG/1
10 TABLET ORAL ONCE
Status: COMPLETED | OUTPATIENT
Start: 2019-08-14 | End: 2019-08-14

## 2019-08-14 RX ADMIN — HYDROCHLOROTHIAZIDE 25 MG: 25 TABLET ORAL at 21:28

## 2019-08-14 RX ADMIN — LISINOPRIL 10 MG: 10 TABLET ORAL at 21:28

## 2019-08-14 ASSESSMENT — ENCOUNTER SYMPTOMS
ABDOMINAL PAIN: 0
NAUSEA: 0
EYE ITCHING: 0
DIARRHEA: 0
SHORTNESS OF BREATH: 0
ABDOMINAL DISTENTION: 0
COUGH: 0
VOMITING: 0
EYE DISCHARGE: 0
WHEEZING: 0
RHINORRHEA: 0

## 2019-08-15 ENCOUNTER — TELEPHONE (OUTPATIENT)
Dept: FAMILY MEDICINE CLINIC | Age: 64
End: 2019-08-15

## 2019-08-15 PROCEDURE — 93010 ELECTROCARDIOGRAM REPORT: CPT | Performed by: INTERNAL MEDICINE

## 2019-08-15 NOTE — ED PROVIDER NOTES
wheezes. Abdominal: Soft. There is no tenderness. There is no rebound and no guarding. Musculoskeletal: She exhibits no edema or tenderness. Neurological: She is alert and oriented to person, place, and time. No cranial nerve deficit. Coordination normal.   Skin: Skin is warm and dry. She is not diaphoretic. Psychiatric: She has a normal mood and affect. Her behavior is normal. Judgment and thought content normal.   Nursing note and vitals reviewed. DIFFERENTIAL DIAGNOSIS:   Primary hypertension, hypertensive encephalopathy, subarachnoid hemorrhage, AMI    DIAGNOSTIC RESULTS     EKG: All EKG's are interpreted by the Emergency Department Physician who either signs or Co-signsthis chart in the absence of a cardiologist.  Interpreted by me  Normal sinus rhythm  Ventricular rate 74 bpm  HI interval 172 ms  QRS duration 80 ms  QTc 439 ms  Cannot rule out anterior infarct, age undetermined  No ST elevation or acute T wave    RADIOLOGY: non-plain film images(s) such as CT, Ultrasound and MRI are read by the radiologist.    [address]   Final Result   Negative CT of the brain for active pathology. errors which are inherent in voice recognition technology. **      Final report electronically signed by Dr. Yo Vela on 8/14/2019 10:37 PM      XR CHEST PORTABLE   Final Result   Stable radiographic appearance of the chest. No evidence of an acute process. **This report has been created using voice recognition software. It may contain minor errors which are inherent in voice recognition technology. **      Final report electronically signed by Dr. Yo Vela on 8/14/2019 10:02 PM          []Visualized and interpreted by me   [] Radiologist's Wet Read Report Reviewed   [] Discussed with Radiologist.    Abdon Love:   Results for orders placed or performed during the hospital encounter of 08/14/19   CBC Auto Differential   Result Value Ref Range    WBC 7.5 4.8 - 10.8 thou/mm3    RBC 4.49 4.20 - 5.40 mill/mm3    Hemoglobin 14.1 12.0 - 16.0 gm/dl    Hematocrit 43.2 37.0 - 47.0 %    MCV 96.2 81.0 - 99.0 fL    MCH 31.4 26.0 - 33.0 pg    MCHC 32.6 32.2 - 35.5 gm/dl    RDW-CV 11.9 11.5 - 14.5 %    RDW-SD 41.6 35.0 - 45.0 fL    Platelets 026 146 - 648 thou/mm3    MPV 9.7 9.4 - 12.4 fL    Seg Neutrophils 70.9 %    Lymphocytes 20.1 %    Monocytes 7.7 %    Eosinophils 0.8 %    Basophils 0.4 %    Immature Granulocytes 0 %    Segs Absolute 5.3 1.8 - 7.7 thou/mm3    Lymphocytes # 1.5 1.0 - 4.8 thou/mm3    Monocytes # 0.6 0.4 - 1.3 thou/mm3    Eosinophils # 0.1 0.0 - 0.4 thou/mm3    Basophils # 0.0 0.0 - 0.1 thou/mm3    Immature Grans (Abs) 0.01 0.00 - 0.07 thou/mm3    nRBC 0 /100 wbc   Comprehensive Metabolic Panel   Result Value Ref Range    Glucose 102 70 - 108 mg/dL    CREATININE 0.8 0.4 - 1.2 mg/dL    BUN 19 7 - 22 mg/dL    Sodium 139 135 - 145 meq/L    Potassium 3.9 3.5 - 5.2 meq/L    Chloride 98 98 - 111 meq/L    CO2 25 23 - 33 meq/L    Calcium 9.6 8.5 - 10.5 mg/dL    AST 16 5 - 40 U/L    Alkaline Phosphatase 75 38 - 126 U/L    Total Protein 7.3 6.1 - 8.0 g/dL    Alb 4.5 3.5 - 5.1 g/dL    Total Bilirubin 0.3 0.3 - 1.2 mg/dL    ALT 15 11 - 66 U/L   Troponin   Result Value Ref Range    Troponin T < 0.010 ng/ml   Brain Natriuretic Peptide   Result Value Ref Range    Pro-BNP 63.9 0.0 - 900.0 pg/mL   Urine with Reflexed Micro   Result Value Ref Range    Glucose, Ur NEGATIVE NEGATIVE mg/dl    Bilirubin Urine NEGATIVE NEGATIVE    Ketones, Urine NEGATIVE NEGATIVE    Specific Gravity, Urine 1.016 1.002 - 1.03    Blood, Urine SMALL (A) NEGATIVE    pH, UA 5.5 5.0 - 9.0    Protein, UA NEGATIVE NEGATIVE    Urobilinogen, Urine 0.2 0.0 - 1.0 eu/dl    Nitrite, Urine NEGATIVE NEGATIVE    Leukocyte Esterase, Urine TRACE (A) NEGATIVE    Color, UA YELLOW STRAW-YELL    Character, Urine CLEAR CLEAR-SL C    RBC, UA 0-2 0-2/hpf /hpf    WBC, UA 0-2 0-4/hpf /hpf    Epi Cells 0-2 3-5/hpf /hpf    Bacteria, UA NONE FEW/NONE S /hpf MEDICATIONS:  Discharge Medication List as of 8/14/2019 11:22 PM      START taking these medications    Details   hydrochlorothiazide (HYDRODIURIL) 25 MG tablet Take 1 tablet by mouth every morning, Disp-30 tablet, R-0Print      lisinopril (PRINIVIL;ZESTRIL) 10 MG tablet Take 1 tablet by mouth daily, Disp-30 tablet, R-0Print             (Please note that portions of this note were completed with a voice recognition program.  Efforts were made to edit the dictations but occasionally words aremis-transcribed.)    MD Melanie Lora MD  08/15/19 [id number]

## 2019-08-19 ENCOUNTER — OFFICE VISIT (OUTPATIENT)
Dept: FAMILY MEDICINE CLINIC | Age: 64
End: 2019-08-19
Payer: COMMERCIAL

## 2019-08-19 VITALS
RESPIRATION RATE: 14 BRPM | DIASTOLIC BLOOD PRESSURE: 72 MMHG | WEIGHT: 275 LBS | SYSTOLIC BLOOD PRESSURE: 118 MMHG | BODY MASS INDEX: 43.07 KG/M2 | HEART RATE: 78 BPM

## 2019-08-19 DIAGNOSIS — I10 BENIGN ESSENTIAL HTN: Primary | ICD-10-CM

## 2019-08-19 DIAGNOSIS — E66.01 MORBID OBESITY WITH BMI OF 40.0-44.9, ADULT (HCC): ICD-10-CM

## 2019-08-19 PROCEDURE — 99213 OFFICE O/P EST LOW 20 MIN: CPT | Performed by: FAMILY MEDICINE

## 2019-08-19 RX ORDER — LISINOPRIL 10 MG/1
10 TABLET ORAL DAILY
Qty: 90 TABLET | Refills: 2 | Status: SHIPPED | OUTPATIENT
Start: 2019-08-19 | End: 2020-05-29

## 2019-08-19 ASSESSMENT — ENCOUNTER SYMPTOMS
EYE PAIN: 0
BACK PAIN: 0
RHINORRHEA: 0
COUGH: 0
CHEST TIGHTNESS: 0
DIARRHEA: 0
NAUSEA: 1
BLOOD IN STOOL: 0
SHORTNESS OF BREATH: 0
WHEEZING: 0
ABDOMINAL PAIN: 0
VOMITING: 0
CONSTIPATION: 0
SORE THROAT: 0

## 2019-08-19 NOTE — PATIENT INSTRUCTIONS
ibuprofen. Some of these medicines can raise blood pressure. · Learn how to check your blood pressure at home. Lifestyle changes  · Stay at a healthy weight. This is especially important if you put on weight around the waist. Losing even 10 pounds can help you lower your blood pressure. · If your doctor recommends it, get more exercise. Walking is a good choice. Bit by bit, increase the amount you walk every day. Try for at least 30 minutes on most days of the week. You also may want to swim, bike, or do other activities. · Avoid or limit alcohol. Talk to your doctor about whether you can drink any alcohol. · Try to limit how much sodium you eat to less than 2,300 milligrams (mg) a day. Your doctor may ask you to try to eat less than 1,500 mg a day. · Eat plenty of fruits (such as bananas and oranges), vegetables, legumes, whole grains, and low-fat dairy products. · Lower the amount of saturated fat in your diet. Saturated fat is found in animal products such as milk, cheese, and meat. Limiting these foods may help you lose weight and also lower your risk for heart disease. · Do not smoke. Smoking increases your risk for heart attack and stroke. If you need help quitting, talk to your doctor about stop-smoking programs and medicines. These can increase your chances of quitting for good. When should you call for help? Call 911 anytime you think you may need emergency care. This may mean having symptoms that suggest that your blood pressure is causing a serious heart or blood vessel problem. Your blood pressure may be over 180/120.   For example, call 911 if:    · You have symptoms of a heart attack. These may include:  ? Chest pain or pressure, or a strange feeling in the chest.  ? Sweating. ? Shortness of breath. ? Nausea or vomiting. ? Pain, pressure, or a strange feeling in the back, neck, jaw, or upper belly or in one or both shoulders or arms. ? Lightheadedness or sudden weakness.   ? A fast or products as much as possible. · Limit lean meat, poultry, and fish to 2 servings each day. A serving is 3 ounces, about the size of a deck of cards. · Eat 4 to 5 servings of nuts, seeds, and legumes (cooked dried beans, lentils, and split peas) each week. A serving is 1/3 cup of nuts, 2 tablespoons of seeds, or ½ cup of cooked beans or peas. · Limit fats and oils to 2 to 3 servings each day. A serving is 1 teaspoon of vegetable oil or 2 tablespoons of salad dressing. · Limit sweets and added sugars to 5 servings or less a week. A serving is 1 tablespoon jelly or jam, ½ cup sorbet, or 1 cup of lemonade. · Eat less than 2,300 milligrams (mg) of sodium a day. If you limit your sodium to 1,500 mg a day, you can lower your blood pressure even more. Tips for success  · Start small. Do not try to make dramatic changes to your diet all at once. You might feel that you are missing out on your favorite foods and then be more likely to not follow the plan. Make small changes, and stick with them. Once those changes become habit, add a few more changes. · Try some of the following:  ? Make it a goal to eat a fruit or vegetable at every meal and at snacks. This will make it easy to get the recommended amount of fruits and vegetables each day. ? Try yogurt topped with fruit and nuts for a snack or healthy dessert. ? Add lettuce, tomato, cucumber, and onion to sandwiches. ? Combine a ready-made pizza crust with low-fat mozzarella cheese and lots of vegetable toppings. Try using tomatoes, squash, spinach, broccoli, carrots, cauliflower, and onions. ? Have a variety of cut-up vegetables with a low-fat dip as an appetizer instead of chips and dip. ? Sprinkle sunflower seeds or chopped almonds over salads. Or try adding chopped walnuts or almonds to cooked vegetables. ? Try some vegetarian meals using beans and peas. Add garbanzo or kidney beans to salads.  Make burritos and tacos with mashed haynes beans or black beans.  Where can you learn more? Go to https://chpepiceweb.Teach Me [email or web address] Be. org and sign in to your [employer] account. Enter Y480 in the DataRank box to learn more about \"DASH Diet: Care Instructions. \"     If you do not have an account, please click on the \"Sign Up Now\" link. Current as of: July 22, 2018  Content Version: 12.1  © 9917-0780 Healthwise, Incorporated. Care instructions adapted under license by [facility] ([facility]). If you have questions about a medical condition or this instruction, always ask your healthcare professional. [address] any warranty or liability for your use of this information.

## 2019-08-19 NOTE — PROGRESS NOTES
Subjective:      Patient ID: Corrine Crenshaw is a 60 y.o. female. HPI  Pt here for follow up from ED for headaches and elevated BP. Was given lisinopril and HCTZ. Bps are much lower. Stopped the HCTZ, maintaining on the lisinopril. Reviewed BMi of 37. Encouraged diet, exercise and weight loss. Reviewed health maintenance. Having some GI upset. Otherwise ok. No further headaches.,  , non smoker, pmh reviewed. Review of Systems   Constitutional: Negative for chills, fatigue, fever and unexpected weight change. HENT: Negative for congestion, ear pain, rhinorrhea and sore throat. Eyes: Negative for pain and visual disturbance. Respiratory: Negative for cough, chest tightness, shortness of breath and wheezing. Cardiovascular: Negative for chest pain and palpitations. Gastrointestinal: Positive for nausea. Negative for abdominal pain, blood in stool, constipation, diarrhea and vomiting. Genitourinary: Negative for difficulty urinating, frequency, hematuria and urgency. Musculoskeletal: Negative for back pain, joint swelling, myalgias and neck pain. Skin: Negative for rash. Neurological: Negative for dizziness and headaches. Hematological: Negative for adenopathy. Does not bruise/bleed easily. Psychiatric/Behavioral: Negative for behavioral problems and sleep disturbance. The patient is not nervous/anxious. Objective:   Physical Exam   Constitutional: She is oriented to person, place, and time. She appears well-developed and well-nourished. HENT:   Head: Normocephalic and atraumatic. Right Ear: External ear normal.   Left Ear: External ear normal.   Nose: Nose normal.   Mouth/Throat: Oropharynx is clear and moist.   Eyes: Pupils are equal, round, and reactive to light. EOM are normal.   Neck: Neck supple. No thyromegaly present. Cardiovascular: Normal rate, regular rhythm and normal heart sounds. Pulmonary/Chest: Breath sounds normal. She has no wheezes.  She has no rales.   Abdominal: Soft. Bowel sounds are normal. There is no tenderness. There is no rebound and no guarding. Musculoskeletal: Normal range of motion. She exhibits no edema. Lymphadenopathy:     She has no cervical adenopathy. Neurological: She is alert and oriented to person, place, and time. She has normal reflexes. No cranial nerve deficit. Skin: Skin is warm and dry. No rash noted. Psychiatric: She has a normal mood and affect. Nursing note and vitals reviewed. Health Maintenance   Topic Date Due    Hepatitis C screen  1955    HIV screen  01/24/1972    Shingles Vaccine (1 of 2) 01/24/2007    A1C test (Diabetic or Prediabetic)  05/11/2016    Cervical cancer screen  06/01/2017    Flu vaccine (1) 09/01/2019    Potassium monitoring  08/14/2020    Creatinine monitoring  08/14/2020    Breast cancer screen  07/17/2021    Colon cancer screen colonoscopy  01/08/2023    DTaP/Tdap/Td vaccine (2 - Td) 12/08/2023    Lipid screen  05/24/2024    Pneumococcal 0-64 years Vaccine  Aged Out       Assessment:      Essential HTN  Morbid obesity      Plan:      Refill lisinopril. Watch tolerability  Monitor BPs. [facility] received counseling on the following healthy behaviors: nutrition, exercise and medication adherence    Patient given educational materials on Hyperlipidemia, Nutrition, Exercise and Hypertension    I have instructed [facility] to complete a self tracking handout on Blood Pressures  and Weights and instructed them to bring it with them to her next appointment. Discussed use, benefit, and side effects of prescribed medications. Barriers to medication compliance addressed. All patient questions answered. Pt voiced understanding.            Edward Oliveira MD

## 2019-08-27 ENCOUNTER — NURSE ONLY (OUTPATIENT)
Dept: LAB | Age: 64
End: 2019-08-27

## 2019-09-16 ENCOUNTER — HOSPITAL ENCOUNTER (OUTPATIENT)
Age: 64
Discharge: HOME | End: 2019-09-16
Payer: COMMERCIAL

## 2019-09-16 LAB
ERYTHROCYTE [DISTWIDTH] IN BLOOD BY AUTOMATED COUNT: 12.2 % (ref 11.5–14.5)
ERYTHROCYTE [DISTWIDTH] IN BLOOD BY AUTOMATED COUNT: 43.7 FL (ref 35–45)
FIBRINOGEN: 56 MG/100ML (ref 155–475)
HCT VFR BLD CALC: 41.4 % (ref 37–47)
HEMOGLOBIN: 13.3 GM/DL (ref 12–16)
MCH RBC QN AUTO: 31.5 PG (ref 26–33)
MCHC RBC AUTO-ENTMCNC: 32.1 GM/DL (ref 32.2–35.5)
MCV RBC AUTO: 98.1 FL (ref 81–99)
PLATELET # BLD: 229 THOU/MM3 (ref 130–400)
PMV BLD AUTO: 9.6 FL (ref 9.4–12.4)
RBC # BLD: 4.22 MILL/MM3 (ref 4.2–5.4)
WBC # BLD: 5.3 THOU/MM3 (ref 4.8–10.8)

## 2019-09-16 PROCEDURE — 85027 COMPLETE CBC AUTOMATED: CPT

## 2019-09-16 PROCEDURE — 85385 FIBRINOGEN ANTIGEN: CPT

## 2019-09-16 PROCEDURE — 36415 COLL VENOUS BLD VENIPUNCTURE: CPT

## 2019-10-01 ENCOUNTER — HOSPITAL ENCOUNTER (OUTPATIENT)
Dept: INFUSION THERAPY | Age: 64
Discharge: HOME | End: 2019-10-01
Payer: COMMERCIAL

## 2019-10-01 ENCOUNTER — OFFICE VISIT (OUTPATIENT)
Dept: ONCOLOGY | Age: 64
End: 2019-10-01
Payer: COMMERCIAL

## 2019-10-01 VITALS
HEART RATE: 75 BPM | WEIGHT: 279 LBS | OXYGEN SATURATION: 97 % | BODY MASS INDEX: 43.79 KG/M2 | HEIGHT: 67 IN | RESPIRATION RATE: 18 BRPM | SYSTOLIC BLOOD PRESSURE: 134 MMHG | DIASTOLIC BLOOD PRESSURE: 82 MMHG | TEMPERATURE: 98.5 F

## 2019-10-01 DIAGNOSIS — Z80.41 FAMILY HISTORY OF OVARIAN CANCER: ICD-10-CM

## 2019-10-01 DIAGNOSIS — D68.2 DYSFIBRINOGENEMIA (HCC): Primary | ICD-10-CM

## 2019-10-01 DIAGNOSIS — Z80.3 FAMILY HISTORY OF BREAST CANCER IN FIRST DEGREE RELATIVE: ICD-10-CM

## 2019-10-01 PROCEDURE — 99204 OFFICE O/P NEW MOD 45 MIN: CPT | Performed by: PHYSICIAN ASSISTANT

## 2019-10-01 PROCEDURE — 99211 OFF/OP EST MAY X REQ PHY/QHP: CPT

## 2019-10-03 ASSESSMENT — ENCOUNTER SYMPTOMS
CONSTIPATION: 0
VOMITING: 0
SHORTNESS OF BREATH: 0
RHINORRHEA: 0
CHEST TIGHTNESS: 0
DIARRHEA: 0
TROUBLE SWALLOWING: 0
SINUS PRESSURE: 0
FACIAL SWELLING: 0
NAUSEA: 0
COUGH: 0
ANAL BLEEDING: 0
BLOOD IN STOOL: 0
WHEEZING: 0

## 2019-11-06 ENCOUNTER — HOSPITAL ENCOUNTER (OUTPATIENT)
Age: 64
Discharge: HOME | End: 2019-11-06
Payer: COMMERCIAL

## 2019-11-06 PROCEDURE — 85385 FIBRINOGEN ANTIGEN: CPT

## 2019-11-10 LAB — MISC. #1 REFERENCE GROUP TEST: ABNORMAL

## 2019-11-27 ENCOUNTER — INITIAL CONSULT (OUTPATIENT)
Dept: ONCOLOGY | Age: 64
End: 2019-11-27
Payer: COMMERCIAL

## 2019-11-27 DIAGNOSIS — Z80.3 FAMILY HISTORY OF BREAST CANCER: ICD-10-CM

## 2019-11-27 DIAGNOSIS — Z80.41 FAMILY HISTORY OF OVARIAN CANCER: Primary | ICD-10-CM

## 2019-11-27 PROCEDURE — 96040 PR GENETIC COUNSELING, EACH 30 MIN: CPT | Performed by: GENETIC COUNSELOR, MS

## 2019-12-12 ENCOUNTER — TELEPHONE (OUTPATIENT)
Dept: ONCOLOGY | Age: 64
End: 2019-12-12

## 2020-01-09 NOTE — TELEPHONE ENCOUNTER
physicians. 2) We encourage Ms. Nelson to contact us every 1-2 years to determine if there are any new genetic testing or research options available. 3) We encourage Ms. Nelson to contact us with updates to her personal and/or familys cancer history as this information may alter our assessment and/or recommendations. The [address] [place] [address] Program would be glad to offer our assistance should you have any questions or concerns about this information. Please feel free to contact us at [phone]. Beau Grullon.  Augustus Essex, MS, [facility]   Licensed Genetic Counselor         CC:  Ms. Pelon Claudio

## 2020-05-29 RX ORDER — LISINOPRIL 10 MG/1
TABLET ORAL
Qty: 90 TABLET | Refills: 2 | Status: SHIPPED | OUTPATIENT
Start: 2020-05-29 | End: 2021-02-08 | Stop reason: SDUPTHER

## 2020-05-29 NOTE — TELEPHONE ENCOUNTER
Raynelle Essex needs refill of   Requested Prescriptions     Pending Prescriptions Disp Refills    lisinopril (PRINIVIL;ZESTRIL) 10 MG tablet [Pharmacy Med Name: LISINOPRIL 10MG TABS] 90 tablet 2     Sig: TAKE 1 TABLET BY MOUTH ONE TIME A DAY       Last Filled on:  8/19/19 90*2    Last Visit Date:  8/19/2019-OV     Next Visit Date:    Visit date not found

## 2020-10-01 ENCOUNTER — HOSPITAL ENCOUNTER (OUTPATIENT)
Dept: WOMENS IMAGING | Age: 65
Discharge: HOME | End: 2020-10-01
Payer: COMMERCIAL

## 2020-10-01 PROCEDURE — 77063 BREAST TOMOSYNTHESIS BI: CPT

## 2021-02-08 ENCOUNTER — OFFICE VISIT (OUTPATIENT)
Dept: FAMILY MEDICINE CLINIC | Age: 66
End: 2021-02-08
Payer: COMMERCIAL

## 2021-02-08 VITALS
HEART RATE: 67 BPM | TEMPERATURE: 96.4 F | RESPIRATION RATE: 18 BRPM | SYSTOLIC BLOOD PRESSURE: 118 MMHG | HEIGHT: 67 IN | BODY MASS INDEX: 45.99 KG/M2 | OXYGEN SATURATION: 97 % | DIASTOLIC BLOOD PRESSURE: 64 MMHG | WEIGHT: 293 LBS

## 2021-02-08 DIAGNOSIS — I10 BENIGN ESSENTIAL HTN: ICD-10-CM

## 2021-02-08 DIAGNOSIS — N32.81 OAB (OVERACTIVE BLADDER): ICD-10-CM

## 2021-02-08 DIAGNOSIS — R53.83 FATIGUE, UNSPECIFIED TYPE: ICD-10-CM

## 2021-02-08 DIAGNOSIS — Z00.00 WELL ADULT EXAM: Primary | ICD-10-CM

## 2021-02-08 PROBLEM — D68.2 DYSFIBRINOGENEMIA (HCC): Status: ACTIVE | Noted: 2020-12-03

## 2021-02-08 PROCEDURE — 99396 PREV VISIT EST AGE 40-64: CPT | Performed by: FAMILY MEDICINE

## 2021-02-08 PROCEDURE — 99213 OFFICE O/P EST LOW 20 MIN: CPT | Performed by: FAMILY MEDICINE

## 2021-02-08 RX ORDER — SOLIFENACIN SUCCINATE 5 MG/1
5 TABLET, FILM COATED ORAL DAILY
Qty: 90 TABLET | Refills: 3 | Status: SHIPPED | OUTPATIENT
Start: 2021-02-08 | End: 2022-02-02 | Stop reason: SDUPTHER

## 2021-02-08 RX ORDER — LISINOPRIL 10 MG/1
TABLET ORAL
Qty: 90 TABLET | Refills: 3 | Status: SHIPPED | OUTPATIENT
Start: 2021-02-08 | End: 2022-02-02 | Stop reason: SDUPTHER

## 2021-02-08 RX ORDER — SOLIFENACIN SUCCINATE 5 MG/1
TABLET, FILM COATED ORAL
COMMUNITY
Start: 2020-05-06 | End: 2021-02-08 | Stop reason: SDUPTHER

## 2021-02-08 ASSESSMENT — ENCOUNTER SYMPTOMS
ABDOMINAL PAIN: 0
WHEEZING: 0
NAUSEA: 0
BACK PAIN: 0
CONSTIPATION: 0
SHORTNESS OF BREATH: 0
DIARRHEA: 0
CHEST TIGHTNESS: 0
VOMITING: 0
SORE THROAT: 0
BLOOD IN STOOL: 0
RHINORRHEA: 0
COUGH: 0
EYE PAIN: 0

## 2021-02-08 NOTE — PROGRESS NOTES
Jes Goss (:  1955) is a 61 y.o. female,Established patient, here for evaluation of the following chief complaint(s): Annual Exam (refills)      ASSESSMENT/PLAN:  1. Well adult exam  -     TSH without Reflex; Future  -     T4, Free; Future  -     T3, Free; Future  -     CBC Auto Differential; Future  -     Basic Metabolic Panel; Future  -     Vitamin B12 & Folate; Future  -     Vitamin B12 & Folate  -     Basic Metabolic Panel  -     CBC Auto Differential  -     T3, Free  -     T4, Free  -     TSH without Reflex  2. Benign essential HTN  -     lisinopril (PRINIVIL;ZESTRIL) 10 MG tablet; TAKE 1 TABLET BY MOUTH ONE TIME A DAY, Disp-90 tablet, R-3Normal  -     CBC Auto Differential; Future  -     Basic Metabolic Panel; Future  -     Basic Metabolic Panel  -     CBC Auto Differential  3. OAB (overactive bladder)  -     solifenacin (VESICARE) 5 MG tablet; Take 1 tablet by mouth daily, Disp-90 tablet, R-3Normal  4. Fatigue, unspecified type  -     TSH without Reflex; Future  -     T4, Free; Future  -     T3, Free; Future  -     CBC Auto Differential; Future  -     Vitamin B12 & Folate; Future  -     Vitamin B12 & Folate  -     CBC Auto Differential  -     T3, Free  -     T4, Free  -     TSH without Reflex  -work up fatigue with usual lab work. No follow-ups on file. SUBJECTIVE/OBJECTIVE:  HPI  Seen today due to fatigue, family history of thyroid problems. Reviewed BMI of 46. Encouraged diet, exercise and weight loss. Still works full time. , non smoker, pmh reviewed. Review of Systems   Constitutional: Positive for fatigue. Negative for chills, fever and unexpected weight change. HENT: Negative for congestion, ear pain, rhinorrhea and sore throat. Eyes: Negative for pain and visual disturbance. Respiratory: Negative for cough, chest tightness, shortness of breath and wheezing. Cardiovascular: Negative for chest pain and palpitations. Gastrointestinal: Negative for abdominal pain, blood in stool, constipation, diarrhea, nausea and vomiting. Genitourinary: Negative for difficulty urinating, frequency, hematuria and urgency. Musculoskeletal: Negative for back pain, joint swelling, myalgias and neck pain. Skin: Negative for rash. Neurological: Negative for dizziness and headaches. Hematological: Negative for adenopathy. Does not bruise/bleed easily. Psychiatric/Behavioral: Negative for behavioral problems and sleep disturbance. The patient is not nervous/anxious. Physical Exam  Vitals signs and nursing note reviewed. Constitutional:       Appearance: She is well-developed. HENT:      Head: Normocephalic and atraumatic. Right Ear: External ear normal.      Left Ear: External ear normal.      Nose: Nose normal.      Mouth/Throat:      Mouth: Mucous membranes are moist.   Eyes:      Pupils: Pupils are equal, round, and reactive to light. Neck:      Musculoskeletal: Neck supple. Thyroid: No thyromegaly. Vascular: No carotid bruit. Cardiovascular:      Rate and Rhythm: Normal rate and regular rhythm. Heart sounds: Normal heart sounds. Pulmonary:      Breath sounds: Normal breath sounds. No wheezing or rales. Abdominal:      General: Bowel sounds are normal.      Palpations: Abdomen is soft. Tenderness: There is no abdominal tenderness. There is no guarding or rebound. Musculoskeletal: Normal range of motion. Lymphadenopathy:      Cervical: No cervical adenopathy. Skin:     General: Skin is warm and dry. Findings: No rash. Neurological:      Mental Status: She is alert and oriented to person, place, and time. Cranial Nerves: No cranial nerve deficit. Deep Tendon Reflexes: Reflexes are normal and symmetric. An electronic signature was used to authenticate this note.     --Danilo Jerez MD

## 2021-02-08 NOTE — PATIENT INSTRUCTIONS
Patient Education        Well Visit, Women 48 to 72: Care Instructions  Your Care Instructions     Physical exams can help you stay healthy. Your doctor has checked your overall health and may have suggested ways to take good care of yourself. He or she also may have recommended tests. At home, you can help prevent illness with healthy eating, regular exercise, and other steps. Follow-up care is a key part of your treatment and safety. Be sure to make and go to all appointments, and call your doctor if you are having problems. It's also a good idea to know your test results and keep a list of the medicines you take. How can you care for yourself at home? · Reach and stay at a healthy weight. This will lower your risk for many problems, such as obesity, diabetes, heart disease, and high blood pressure. · Get at least 30 minutes of exercise on most days of the week. Walking is a good choice. You also may want to do other activities, such as running, swimming, cycling, or playing tennis or team sports. · Do not smoke. Smoking can make health problems worse. If you need help quitting, talk to your doctor about stop-smoking programs and medicines. These can increase your chances of quitting for good. · Protect your skin from too much sun. When you're outdoors from 10 a.m. to 4 p.m., stay in the shade or cover up with clothing and a hat with a wide brim. Wear sunglasses that block UV rays. Even when it's cloudy, put broad-spectrum sunscreen (SPF 30 or higher) on any exposed skin. · See a dentist one or two times a year for checkups and to have your teeth cleaned. · Wear a seat belt in the car. Follow your doctor's advice about when to have certain tests. These tests can spot problems early. · Cholesterol. Your doctor will tell you how often to have this done based on your age, family history, or other things that can increase your risk for heart attack and stroke. · Blood pressure. Have your blood pressure checked during a routine doctor visit. Your doctor will tell you how often to check your blood pressure based on your age, your blood pressure results, and other factors. · Mammogram. Ask your doctor how often you should have a mammogram, which is an X-ray of your breasts. A mammogram can spot breast cancer before it can be felt and when it is easiest to treat. · Pap test and pelvic exam. Ask your doctor how often you should have a Pap test. You may not need to have a Pap test as often as you used to. · Vision. Have your eyes checked every year or two or as often as your doctor suggests. Some experts recommend that you have yearly exams for glaucoma and other age-related eye problems starting at age 48. · Hearing. Tell your doctor if you notice any change in your hearing. You can have tests to find out how well you hear. · Diabetes. Ask your doctor whether you should have tests for diabetes. · Colorectal cancer. Your risk for colorectal cancer gets higher as you get older. Some experts say that adults should start regular screening at age 48 and stop at age 76. Others say to start before age 48 or continue after age 76. Talk with your doctor about your risk and when to start and stop screening. · Thyroid disease. Talk to your doctor about whether to have your thyroid checked as part of a regular physical exam. Women have an increased chance of a thyroid problem. · Osteoporosis. You should begin tests for bone density at age 72. If you are younger than 72, ask your doctor whether you have factors that may increase your risk for this disease. You may want to have this test before age 72. Lifestyle changes, such as eating healthy and being active, are always important to help lower blood pressure. You might also take medicine to reach your blood pressure goal.  Follow-up care is a key part of your treatment and safety. Be sure to make and go to all appointments, and call your doctor if you are having problems. It's also a good idea to know your test results and keep a list of the medicines you take. How can you care for yourself at home? Medical treatment  · If you stop taking your medicine, your blood pressure will go back up. You may take one or more types of medicine to lower your blood pressure. Be safe with medicines. Take your medicine exactly as prescribed. Call your doctor if you think you are having a problem with your medicine. · Talk to your doctor before you start taking aspirin every day. Aspirin can help certain people lower their risk of a heart attack or stroke. But taking aspirin isn't right for everyone, because it can cause serious bleeding. · See your doctor regularly. You may need to see the doctor more often at first or until your blood pressure comes down. · If you are taking blood pressure medicine, talk to your doctor before you take decongestants or anti-inflammatory medicine, such as ibuprofen. Some of these medicines can raise blood pressure. · Learn how to check your blood pressure at home. Lifestyle changes  · Stay at a healthy weight. This is especially important if you put on weight around the waist. Losing even 10 pounds can help you lower your blood pressure. · If your doctor recommends it, get more exercise. Walking is a good choice. Bit by bit, increase the amount you walk every day. Try for at least 30 minutes on most days of the week. You also may want to swim, bike, or do other activities. · Avoid or limit alcohol. Talk to your doctor about whether you can drink any alcohol. · Try to limit how much sodium you eat to less than 2,300 milligrams (mg) a day. Your doctor may ask you to try to eat less than 1,500 mg a day. · Eat plenty of fruits (such as bananas and oranges), vegetables, legumes, whole grains, and low-fat dairy products. · Lower the amount of saturated fat in your diet. Saturated fat is found in animal products such as milk, cheese, and meat. Limiting these foods may help you lose weight and also lower your risk for heart disease. · Do not smoke. Smoking increases your risk for heart attack and stroke. If you need help quitting, talk to your doctor about stop-smoking programs and medicines. These can increase your chances of quitting for good. When should you call for help? Call  911 anytime you think you may need emergency care. This may mean having symptoms that suggest that your blood pressure is causing a serious heart or blood vessel problem. Your blood pressure may be over 180/120. For example, call 911 if:    · You have symptoms of a heart attack. These may include:  ? Chest pain or pressure, or a strange feeling in the chest.  ? Sweating. ? Shortness of breath. ? Nausea or vomiting. ? Pain, pressure, or a strange feeling in the back, neck, jaw, or upper belly or in one or both shoulders or arms. ? Lightheadedness or sudden weakness. ? A fast or irregular heartbeat.     · You have symptoms of a stroke. These may include:  ? Sudden numbness, tingling, weakness, or loss of movement in your face, arm, or leg, especially on only one side of your body. ? Sudden vision changes. ? Sudden trouble speaking. ? Sudden confusion or trouble understanding simple statements. ? Sudden problems with walking or balance. ? A sudden, severe headache that is different from past headaches.     · You have severe back or belly pain. Do not wait until your blood pressure comes down on its own. Get help right away.   Call your doctor now or seek immediate care if:   · Your blood pressure is much higher than normal (such as 180/120 or higher), but you don't have symptoms.     · You think high blood pressure is causing symptoms, such as:  ? Severe headache.  ? Blurry vision. Watch closely for changes in your health, and be sure to contact your doctor if:    · Your blood pressure measures higher than your doctor recommends at least 2 times. That means the top number is higher or the bottom number is higher, or both.     · You think you may be having side effects from your blood pressure medicine. Where can you learn more? Go to https://ROCKIpepicUniverstar Science [employer] Technologyeb.[employer]ach[employer]. org and sign in to your [employer] account. Enter D551 in the Dailymotion box to learn more about \"High Blood Pressure: Care Instructions. \"     If you do not have an account, please click on the \"Sign Up Now\" link. Current as of: December 16, 2019               Content Version: 12.6  © 4240-0628 [employer]. Care instructions adapted under license by [facility] [address] If you have questions about a medical condition or this instruction, always ask your healthcare professional. [address] any warranty or liability for your use of this information. Patient Education        DASH Diet: Care Instructions  Your Care Instructions     The DASH diet is an eating plan that can help lower your blood pressure. DASH stands for Dietary Approaches to Stop Hypertension. Hypertension is high blood pressure. The DASH diet focuses on eating foods that are high in calcium, potassium, and magnesium. These nutrients can lower blood pressure. The foods that are highest in these nutrients are fruits, vegetables, low-fat dairy products, nuts, seeds, and legumes. But taking calcium, potassium, and magnesium supplements instead of eating foods that are high in those nutrients does not have the same effect. The DASH diet also includes whole grains, fish, and poultry. The DASH diet is one of several lifestyle changes your doctor may recommend to lower your high blood pressure. Your doctor may also want you to decrease the amount of sodium in your diet. Lowering sodium while following the DASH diet can lower blood pressure even further than just the DASH diet alone. Follow-up care is a key part of your treatment and safety. Be sure to make and go to all appointments, and call your doctor if you are having problems. It's also a good idea to know your test results and keep a list of the medicines you take. How can you care for yourself at home? Following the DASH diet  · Eat 4 to 5 servings of fruit each day. A serving is 1 medium-sized piece of fruit, ½ cup chopped or canned fruit, 1/4 cup dried fruit, or 4 ounces (½ cup) of fruit juice. Choose fruit more often than fruit juice. · Eat 4 to 5 servings of vegetables each day. A serving is 1 cup of lettuce or raw leafy vegetables, ½ cup of chopped or cooked vegetables, or 4 ounces (½ cup) of vegetable juice. Choose vegetables more often than vegetable juice. · Get 2 to 3 servings of low-fat and fat-free dairy each day. A serving is 8 ounces of milk, 1 cup of yogurt, or 1 ½ ounces of cheese. · Eat 6 to 8 servings of grains each day. A serving is 1 slice of bread, 1 ounce of dry cereal, or ½ cup of cooked rice, pasta, or cooked cereal. Try to choose whole-grain products as much as possible. · Limit lean meat, poultry, and fish to 2 servings each day. A serving is 3 ounces, about the size of a deck of cards. · Eat 4 to 5 servings of nuts, seeds, and legumes (cooked dried beans, lentils, and split peas) each week. A serving is 1/3 cup of nuts, 2 tablespoons of seeds, or ½ cup of cooked beans or peas. · Limit fats and oils to 2 to 3 servings each day. A serving is 1 teaspoon of vegetable oil or 2 tablespoons of salad dressing. · Limit sweets and added sugars to 5 servings or less a week. A serving is 1 tablespoon jelly or jam, ½ cup sorbet, or 1 cup of lemonade. · Eat less than 2,300 milligrams (mg) of sodium a day. If you limit your sodium to 1,500 mg a day, you can lower your blood pressure even more. Tips for success  · Start small. Do not try to make dramatic changes to your diet all at once. You might feel that you are missing out on your favorite foods and then be more likely to not follow the plan. Make small changes, and stick with them. Once those changes become habit, add a few more changes. · Try some of the following:  ? Make it a goal to eat a fruit or vegetable at every meal and at snacks. This will make it easy to get the recommended amount of fruits and vegetables each day. ? Try yogurt topped with fruit and nuts for a snack or healthy dessert. ? Add lettuce, tomato, cucumber, and onion to sandwiches. ? Combine a ready-made pizza crust with low-fat mozzarella cheese and lots of vegetable toppings. Try using tomatoes, squash, spinach, broccoli, carrots, cauliflower, and onions. ? Have a variety of cut-up vegetables with a low-fat dip as an appetizer instead of chips and dip. ? Sprinkle sunflower seeds or chopped almonds over salads. Or try adding chopped walnuts or almonds to cooked vegetables. ? Try some vegetarian meals using beans and peas. Add garbanzo or kidney beans to salads. Make burritos and tacos with mashed haynes beans or black beans. Where can you learn more? Go to https://FOURward ThoughtjerodGeneva Marskay.[employer]. org and sign in to your F[employer] account. Enter R438 in the MyClean box to learn more about \"DASH Diet: Care Instructions. \"     If you do not have an account, please click on the \"Sign Up Now\" link. Current as of: December 16, 2019               Content Version: 12.6  © 8785-7640 [employer]. Care instructions adapted under license by [facility] ([facility]). If you have questions about a medical condition or this instruction, always ask your healthcare professional. [address] any warranty or liability for your use of this information. Patient Education        Fatigue: Care Instructions  Your Care Instructions     Fatigue is a feeling of tiredness, exhaustion, or lack of energy. You may feel fatigue because of too much or not enough activity. It can also come from stress, lack of sleep, boredom, and poor diet. Many medical problems, such as viral infections, can cause fatigue. Emotional problems, especially depression, are often the cause of fatigue. Fatigue is most often a symptom of another problem. Treatment for fatigue depends on the cause. For example, if you have fatigue because you have a certain health problem, treating this problem also treats your fatigue. If depression or anxiety is the cause, treatment may help. Follow-up care is a key part of your treatment and safety. Be sure to make and go to all appointments, and call your doctor if you are having problems. It's also a good idea to know your test results and keep a list of the medicines you take. How can you care for yourself at home? · Get regular exercise. But don't overdo it. Go back and forth between rest and exercise. · Get plenty of rest.  · Eat a healthy diet. Do not skip meals, especially breakfast.  · Reduce your use of caffeine, tobacco, and alcohol. Caffeine is most often found in coffee, tea, cola drinks, and chocolate. · Limit medicines that can cause fatigue. This includes tranquilizers and cold and allergy medicines. When should you call for help? Watch closely for changes in your health, and be sure to contact your doctor if:    · You have new symptoms such as fever or a rash.     · Your fatigue gets worse.   · You have been feeling down, depressed, or hopeless. Or you may have lost interest in things that you usually enjoy.     · You are not getting better as expected. Where can you learn more? Go to https://Augmented Pixels [employer]jerod[employer]erion Advanced Battery.[facility]. org and sign in to your "[employer] account. Enter R130 in the iGoOn s.r.l. box to learn more about \"Fatigue: Care Instructions. \"     If you do not have an account, please click on the \"Sign Up Now\" link. Current as of: June 26, 2019               Content Version: 12.6  © 2008-4908 [employer]. Care instructions adapted under license by [facility] ([facility]). If you have questions about a medical condition or this instruction, always ask your healthcare professional. [address] any warranty or liability for your use of this information.

## 2021-02-08 NOTE — PROGRESS NOTES
2021    Elio Velasquez (:  1955) is a 61 y.o. female, here for a preventive medicine evaluation. Patient Active Problem List   Diagnosis    Heart palpitations    Obesity, mild    Depression    History of fatigue    History of cold sores    Vitamin D deficiency    Glucose intolerance (impaired glucose tolerance)    Dysfibrinogenemia (HCC)       Review of Systems   Constitutional: Positive for fatigue. Negative for chills, fever and unexpected weight change. HENT: Negative for congestion, ear pain, rhinorrhea and sore throat. Eyes: Negative for pain and visual disturbance. Respiratory: Negative for cough, chest tightness, shortness of breath and wheezing. Cardiovascular: Negative for chest pain and palpitations. Gastrointestinal: Negative for abdominal pain, blood in stool, constipation, diarrhea, nausea and vomiting. Genitourinary: Negative for difficulty urinating, frequency, hematuria and urgency. Musculoskeletal: Negative for back pain, joint swelling, myalgias and neck pain. Skin: Negative for rash. Neurological: Negative for dizziness and headaches. Hematological: Negative for adenopathy. Does not bruise/bleed easily. Psychiatric/Behavioral: Negative for behavioral problems and sleep disturbance. The patient is not nervous/anxious. Prior to Visit Medications    Medication Sig Taking? Authorizing Provider   solifenacin (VESICARE) 5 MG tablet Take 1 tablet by mouth daily Yes Snow Smith MD   lisinopril (PRINIVIL;ZESTRIL) 10 MG tablet TAKE 1 TABLET BY MOUTH ONE TIME A DAY Yes Snow Smith MD   ibuprofen (ADVIL;MOTRIN) 800 MG tablet Take 1 tablet by mouth every 8 hours as needed for Pain for 30 doses. Yes Andrew Juarez PA-C   sertraline (ZOLOFT) 100 MG tablet Take 100 mg by mouth daily. Yes Historical Provider, MD   ValACYclovir HCl (VALTREX PO) Take  by mouth as needed.  TAKES AS DIRECTED PRN  Yes Historical Provider, MD No Known Allergies    Past Medical History:   Diagnosis Date    Depression     Glucose intolerance (impaired glucose tolerance)     Heart palpitations     HX OF:    History of cold sores     History of fatigue     Hyperlipidemia     Obesity, mild     Vitamin D deficiency        Past Surgical History:   Procedure Laterality Date    APPENDECTOMY      COLONOSCOPY      TN COLON CA SCRN NOT HI RSK IND N/A 1/8/2018    COLONOSCOPY performed by Gerardo Alba MD at [address] [facility]  04/29/2011    EF 55-65%  NORMAL       Social History     Socioeconomic History    Marital status:      Spouse name: Not on file    Number of children: Not on file    Years of education: Not on file    Highest education level: Not on file   Occupational History    Not on file   Social Needs    Financial resource strain: Not on file    Food insecurity     Worry: Not on file     Inability: Not on file   [employer] needs     Medical: Not on file     Non-medical: Not on file   Tobacco Use    Smoking status: Never Smoker    Smokeless tobacco: Never Used   Substance and Sexual Activity    Alcohol use: Yes     Comment: SOCIAL    Drug use: No    Sexual activity: Not on file   Lifestyle    Physical activity     Days per week: Not on file     Minutes per session: Not on file    Stress: Not on file   Relationships    Social connections     Talks on phone: Not on file     Gets together: Not on file     Attends Anabaptist service: Not on file     Active member of club or organization: Not on file     Attends meetings of clubs or organizations: Not on file     Relationship status: Not on file    Intimate partner violence     Fear of current or ex partner: Not on file     Emotionally abused: Not on file     Physically abused: Not on file     Forced sexual activity: Not on file   Other Topics Concern    Not on file   Social History Narrative    Not on file Family History   Problem Relation Age of Onset    Heart Disease Mother        ADVANCE DIRECTIVE: N, <no information>    Vitals:    02/08/21 1356   BP: 118/64   Site: Left Upper Arm   Position: Sitting   Cuff Size: Large Adult   Pulse: 67   Resp: 18   Temp: 96.4 °F (35.8 °C)   TempSrc: Infrared   SpO2: 97%   Weight: 294 lb (133.4 kg)   Height: 5' 6.7\" (1.694 m)     Estimated body mass index is 46.46 kg/m² as calculated from the following:    Height as of this encounter: 5' 6.7\" (1.694 m). Weight as of this encounter: 294 lb (133.4 kg). Physical Exam  Vitals signs and nursing note reviewed. Constitutional:       Appearance: She is well-developed. HENT:      Head: Normocephalic and atraumatic. Right Ear: External ear normal.      Left Ear: External ear normal.      Nose: Nose normal.      Mouth/Throat:      Mouth: Mucous membranes are moist.   Eyes:      Pupils: Pupils are equal, round, and reactive to light. Neck:      Musculoskeletal: Neck supple. Thyroid: No thyromegaly. Vascular: No carotid bruit. Cardiovascular:      Rate and Rhythm: Normal rate and regular rhythm. Heart sounds: Normal heart sounds. Pulmonary:      Breath sounds: Normal breath sounds. No wheezing or rales. Abdominal:      General: Bowel sounds are normal.      Palpations: Abdomen is soft. Tenderness: There is no abdominal tenderness. There is no guarding or rebound. Musculoskeletal: Normal range of motion. Lymphadenopathy:      Cervical: No cervical adenopathy. Skin:     General: Skin is warm and dry. Findings: No rash. Neurological:      Mental Status: She is alert and oriented to person, place, and time. Cranial Nerves: No cranial nerve deficit. Deep Tendon Reflexes: Reflexes are normal and symmetric. No flowsheet data found.     Lab Results   Component Value Date    CHOL 309 05/24/2019    CHOL 290 05/12/2018    CHOL 266 05/25/2017    TRIG 143 05/24/2019 TRIG 157 05/12/2018    TRIG 178 05/25/2017    HDL 58 05/24/2019    HDL 59 05/12/2018    HDL 52 05/25/2017    LDLCALC 222 05/24/2019    LDLCALC 200 05/12/2018    LDLCALC 178 05/25/2017    GLUCOSE 102 08/14/2019    GLUCOSE 88 05/11/2015    LABA1C 6.1 05/11/2015       The 10-year ASCVD risk score (Oriana Lane, et al., 2013) is: 7.2%    Values used to calculate the score:      Age: 61 years      Sex: Female      Is Non- : No      Diabetic: No      Tobacco smoker: No      Systolic Blood Pressure: 325 mmHg      Is BP treated: Yes      HDL Cholesterol: 58 mg/dL      Total Cholesterol: 309 mg/dL    Immunization History   Administered Date(s) Administered    Influenza A ([id number]) Vaccine PF IM 11/06/2009, 12/03/2009    Influenza Virus Vaccine 10/17/2017, 10/05/2018, 10/18/2019, 10/16/2020    Tdap (Boostrix, Adacel) 12/08/2013       Health Maintenance   Topic Date Due    Hepatitis C screen  1955    HIV screen  01/24/1972    Shingles Vaccine (1 of 2) 01/24/2007    A1C test (Diabetic or Prediabetic)  05/11/2016    Potassium monitoring  08/14/2020    Creatinine monitoring  08/14/2020    Cervical cancer screen  08/26/2022    Breast cancer screen  10/01/2022    Colon cancer screen colonoscopy  01/08/2023    DTaP/Tdap/Td vaccine (2 - Td) 12/08/2023    Lipid screen  05/24/2024    Flu vaccine  Completed    Hepatitis A vaccine  Aged Out    Hepatitis B vaccine  Aged Out    Hib vaccine  Aged Out    Meningococcal (ACWY) vaccine  Aged Out    Pneumococcal 0-64 years Vaccine  Aged Out       ASSESSMENT/PLAN:  1. Well adult exam  -     TSH without Reflex; Future  -     T4, Free; Future  -     T3, Free; Future  -     CBC Auto Differential; Future  -     Basic Metabolic Panel; Future  -     Vitamin B12 & Folate; Future  2.  Benign essential HTN  -     lisinopril (PRINIVIL;ZESTRIL) 10 MG tablet; TAKE 1 TABLET BY MOUTH ONE TIME A DAY, Disp-90 tablet, R-3Normal  -     CBC Auto Differential; Future -     Basic Metabolic Panel; Future  3. OAB (overactive bladder)  -     solifenacin (VESICARE) 5 MG tablet; Take 1 tablet by mouth daily, Disp-90 tablet, R-3Normal  4. Fatigue, unspecified type  -     TSH without Reflex; Future  -     T4, Free; Future  -     T3, Free; Future  -     CBC Auto Differential; Future  -     Vitamin B12 & Folate; Future      No follow-ups on file. An electronic signature was used to authenticate this note.     --Snow Smith MD on 2/8/2021 at 2:28 PM

## 2021-02-09 ENCOUNTER — TELEPHONE (OUTPATIENT)
Dept: FAMILY MEDICINE CLINIC | Age: 66
End: 2021-02-09

## 2021-02-09 LAB
ANION GAP SERPL CALCULATED.3IONS-SCNC: 12 MEQ/L (ref 8–16)
BASOPHILS # BLD: 0.8 %
BASOPHILS ABSOLUTE: 0 THOU/MM3 (ref 0–0.1)
BUN BLDV-MCNC: 21 MG/DL (ref 7–22)
CALCIUM SERPL-MCNC: 9.2 MG/DL (ref 8.5–10.5)
CHLORIDE BLD-SCNC: 103 MEQ/L (ref 98–111)
CO2: 25 MEQ/L (ref 23–33)
CREAT SERPL-MCNC: 0.9 MG/DL (ref 0.4–1.2)
EOSINOPHIL # BLD: 2.7 %
EOSINOPHILS ABSOLUTE: 0.1 THOU/MM3 (ref 0–0.4)
ERYTHROCYTE [DISTWIDTH] IN BLOOD BY AUTOMATED COUNT: 12.3 % (ref 11.5–14.5)
ERYTHROCYTE [DISTWIDTH] IN BLOOD BY AUTOMATED COUNT: 44.1 FL (ref 35–45)
FOLATE: 8 NG/ML (ref 4.8–24.2)
GFR SERPL CREATININE-BSD FRML MDRD: 63 ML/MIN/1.73M2
GLUCOSE BLD-MCNC: 80 MG/DL (ref 70–108)
HCT VFR BLD CALC: 42.2 % (ref 37–47)
HEMOGLOBIN: 13.4 GM/DL (ref 12–16)
IMMATURE GRANS (ABS): 0.03 THOU/MM3 (ref 0–0.07)
IMMATURE GRANULOCYTES: 0.6 %
LYMPHOCYTES # BLD: 30.4 %
LYMPHOCYTES ABSOLUTE: 1.5 THOU/MM3 (ref 1–4.8)
MCH RBC QN AUTO: 31.5 PG (ref 26–33)
MCHC RBC AUTO-ENTMCNC: 31.8 GM/DL (ref 32.2–35.5)
MCV RBC AUTO: 99.1 FL (ref 81–99)
MONOCYTES # BLD: 11.8 %
MONOCYTES ABSOLUTE: 0.6 THOU/MM3 (ref 0.4–1.3)
NUCLEATED RED BLOOD CELLS: 0 /100 WBC
PLATELET # BLD: 223 THOU/MM3 (ref 130–400)
PMV BLD AUTO: 11.2 FL (ref 9.4–12.4)
POTASSIUM SERPL-SCNC: 4.4 MEQ/L (ref 3.5–5.2)
RBC # BLD: 4.26 MILL/MM3 (ref 4.2–5.4)
SEG NEUTROPHILS: 53.7 %
SEGMENTED NEUTROPHILS ABSOLUTE COUNT: 2.6 THOU/MM3 (ref 1.8–7.7)
SODIUM BLD-SCNC: 140 MEQ/L (ref 135–145)
T3 FREE: 2.31 PG/ML (ref 2.02–4.43)
T4 FREE: 1.04 NG/DL (ref 0.93–1.76)
TSH SERPL DL<=0.05 MIU/L-ACNC: 1.45 UIU/ML (ref 0.4–4.2)
VITAMIN B-12: 562 PG/ML (ref 211–911)
WBC # BLD: 4.8 THOU/MM3 (ref 4.8–10.8)

## 2021-02-09 NOTE — TELEPHONE ENCOUNTER
----- Message from Gina Pedraza MD sent at 2/9/2021 11:17 AM EST -----  Thyroids are ok. BMP is normal.  CBC is good. B12 is normal.  Labs are good, continue present.

## 2021-10-20 ENCOUNTER — HOSPITAL ENCOUNTER (OUTPATIENT)
Dept: MAMMOGRAPHY | Age: 66
Discharge: HOME | End: 2021-10-20
Payer: COMMERCIAL

## 2021-10-20 DIAGNOSIS — Z12.31 VISIT FOR SCREENING MAMMOGRAM: ICD-10-CM

## 2021-10-20 PROCEDURE — 77063 BREAST TOMOSYNTHESIS BI: CPT

## 2021-12-17 ENCOUNTER — HOSPITAL ENCOUNTER (OUTPATIENT)
Dept: PHYSICAL THERAPY | Age: 66
Setting detail: THERAPIES SERIES
Discharge: HOME | End: 2021-12-17
Payer: COMMERCIAL

## 2021-12-17 PROCEDURE — 97162 PT EVAL MOD COMPLEX 30 MIN: CPT

## 2021-12-17 PROCEDURE — 97110 THERAPEUTIC EXERCISES: CPT

## 2021-12-17 NOTE — PROGRESS NOTES
** PLEASE SIGN, DATE AND TIME CERTIFICATION BELOW AND RETURN TO [facility] OUTPATIENT REHABILITATION (FAX #: [phone]). ATTEST/CO-SIGN IF ACCESSING VIA [email or web address]. THANK YOU.**    I certify that I have examined the patient below and determined that Physical Medicine and Rehabilitation service is necessary and that I approve the established plan of care for up to 90 days or as specifically noted.   Attestation, signature or co-signature of physician indicates approval of certification requirements.    ________________________ ____________ __________  Physician Signature   Date   Time  [address] [facility]  PHYSICAL THERAPY  [x] EVALUATION  [] DAILY NOTE (LAND) [] DAILY NOTE (AQUATIC ) [] PROGRESS NOTE [] DISCHARGE NOTE    [x] [address] [facility]   [] [place] [address]    [] [facility]   [] Ella Crimes    Date: 2021  Patient Name:  Smiley Howell  : 1955  MRN: [id number]  CSN: [id number]    Referring Practitioner Gabe Donis MD   Diagnosis M17.) bilateral primary osteoarthritis of knees    Treatment Diagnosis Left knee pain with difficulty walking, negotiating stair steps, BLE weakness,    Date of Evaluation 21    Additional Pertinent History HTN, urinaryincontinence- urgency,       Functional Outcome Measure Used LE Functional Index   Functional Outcome Score 60/80 (21) initial evaluation      Insurance: Primary: Payor: MEDICAL [place] /  /  / ,   Secondary:    Authorization Information: PRE CERTIFICATION REQUIRED: NO  INSURANCE THERAPY BENEFIT:  UNLIMITED VISITS BASED ON MEDICAL NECESSITY   AQUATIC THERAPY COVERED: YES  MODALITIES COVERED:  YES, YES MASSAGE  TELEHEALTH COVERED: NO  REFERENCE NUMBER: [id number]   Visit # 1, 1/10 for progress note   Visits Allowed: Based on medical necessity   Recertification Date:    Physician Follow-Up: Follow up in 3 months, 3/14/2022 @9:50 am.    Physician Orders: Quad and hamstring strengthening, gait training and other modalities 2x a week for 6-8 weeks. History of Present Illness: Patient reports of bilateral knee pain. Left knee greater than right. Noting increased pain in the summer at her left knee with difficulty with stair steps, stepping in and out of a boat. Notes pain has increased in last 2 months as well as weakness at her left knee >right knee. Notes difficulty more now with stair steps, walking, getting in/out of car. Unable to stoop/squat, kneeling, diffficulty getting up from floor. Difficulty with bleacher steps to attend basketball games. Notes pain location behind left knee with sitting, standing/walking notes pain in knee joint and anteriomedical knee. Notes not so much with standing but sit<>stand transfers from seated positions. To manage pain: Ibuprofen, But now on Mobic daily. Changes position after prolonged sitting and need to stand up to stretch left knee out. Paces activities. No difficulty sleeping. 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 SUBJECTIVE: Patient reports of bilateral knee pain. Left knee greater than right. Noting increased pain in the summer at her left knee with difficulty with stair steps, stepping in and out of a boat. Notes pain has increased in last 2 months as well as weakness at her left knee >right knee. Notes difficulty more now with stair steps, walking, getting in/out of car. Unable to stoop/squat, kneeling, diffficulty getting up from floor. Difficulty with bleacher steps to attend basketball games. Notes pain location behind left knee with sitting, standing/walking notes pain in knee joint and anteriomedical knee. Notes not so much with standing but sit<>stand transfers from seated positions. To manage pain: Ibuprofen, But now on Mobic daily. Changes position after prolonged sitting and need to stand up to stretch left knee out. Paces activities. No difficulty sleeping. Social/Functional History and Current Status:  Medications and Allergies have been reviewed and are listed on Medical History Questionnaire. Mima Nelson lives with spouse in a multiple floor home with ability to complete ADL's on main floor with stairs and a handrail to enter. 3 steps to enter    Task Previous Current   ADLs  Independent Modified Independent   IADL's Independent Modified Independent   Ambulation Independent Modified Independent   Transfers Independent Modified Independent   Recreation attend POS on [employer] sporting activities, take trips to cottage, boat, has stat bicycle [address] Independent   Driving Active  Active    Work [employer]. 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 Objective:    OBSERVATION   Pain 4-5/10 left knee, right knee weakness due to compensation because of left knee pain. Palpation Note no joint line tenderness bilateral knees, Note mild soft tissue swelling and tenderness left popliteal region. PF region and patellar tendon no pain noted. Sensation intact   Edema Nil        Bed Mobility independent   Transfers Modified independent with use of UEs to assist self in sitting and standing   Ambulation Ambulates with moderate antalgic gait with decreased wt shift through LLE, decreased stance through LLE in standing. Stairs Non reciprocally ascends and descends favoring LLE due to left knee pain and more difficulty with descending. Use of bilateral handrails   Balance Narrow stance e.o/e.c normal balance, Tandem stance note unsteady l/r, r/l but maintained for 20-30 seconds, SLS right 12 seconds, left 10 seconds.               LOWER EXTREMITY RANGE OF MOTION    Left Right Comments   Hip Flexion knee to chest WNLS WNLS    Hip Extension WNLS WNLS    Hip ABDuction WNLS WNLS    Hip ADDuction WNLS WNLS    Hip Internal Rotation WNLS WNLS    Hip External Rotation WNLS WNLS    Hip Range of Motion is [facility]  [x]      Knee Flexion 110 degrees 124 degrees Left knee pain   Knee Extension 12 degrees 7 degrees    Knee Range of Motion is [facility]  []       LOWER EXTREMITY STRENGTH    Left Right Comments   Hip Flexion 4-/5 4/5    Hip Abduction 4-/5 4-/5    Hip Adduction 3+ to 4-/5 3+ to 4-/5    Hip Extension 3+/5 3+/5    Hip External Rotation n/a n/a    Knee Flexion 4-/5 4-/5    Knee Extension 4-/5 4/5    Ankle DF WNLS WNLS    Ankle PF WNLS WNLS    LE strength is WFL []  Weakness at ankle evertors 4-/5 Noted inversion with difficulty with heelraises without inverting feet. CORE STRENGTH            Seconds decreased core strength with lateral lean with MMT in seated positions for LE, decreased core control with difficulty keeping lumbar neutral. Pelvis neutral with SLR. TREATMENT   Precautions: Left knee OA>right knee OA   Pain: 4-5/10 left knee, right knee 0/10     X in shaded column indicates activity completed today   Modalities Parameters/  Location  Notes                     Manual Therapy Time/Technique  Notes                     Exercise/Intervention   Notes   Quad set  5x  x    Straight Leg Raises (quad set first and then SLR)  2x5  x                                                     therap pool consent, general information, pool tour completed   x             Specific Interventions Next Treatment: US pulsed 3.3 mhz at 0.8 w/cm2 for 8 minutes to left knee medial and lateral, posterior knee. therap ex for ROM, strengthening, balance, Therapeutic pool for gait training, strengthening, ROM bilateral knees/hips, core. Focusing on left knee. Activity/Treatment Tolerance:  [x]  Patient tolerated treatment well  []  Patient limited by fatigue  [x]  Patient limited by pain   []  Patient limited by medical complications  []  Other:     Assessment:Patient referred to PT with bilateral knee pain/OA. Patient having more left knee pain and BLE weakness at hips, knees and core musculature. Patient also has decreased balance and difficulty walking due to left knee pain and weakness. Will follow 2x per week to address listed deficits. Body Structures/Functions/Activity Limitations: impaired activity tolerance, impaired balance, impaired ROM, impaired strength, pain and abnormal gait  Prognosis: good      Goals    Patient Goal: To be able to walkl 1 mile without knee pain and attend [employer] basketball games with less knee pain negotiating bleacher steps. Be able to get in car with less pain, Be able to get in/out of boat with more ease with less assistance. Short Term Goals: 4 weeks  1. Patient to report of decrease left knee pain from 4-5/10 with walking, sit<>stand from standard chair. 2. Patient to demonstrate increased left knee ROM from 12 degrees to 110 degrees to 5 degrees to 115 degrees knee flexion. 3. Patient to demonstrate progression with LE strength: LLE from 4-/5 to 4/5, RLE 4/5 to 5/5 with improved stability through knees and hips with walking, stair steps, partial squats. 4. Patient to demonstrate increased balance  SLS 10-12 seconds to 15 seconds right/left SLS with improved balance with walking. 5. Patient to demonstrate improved walking patter with more even wt shift with increased stance/wb through LLE without pain with walking. Long Term Goals: 8 weeks  1, LE functional index from 60/80 to 65/80  2. Patient independent with HEP with follow through for strength, ROM, balance, core strengthening, pain management bilateral knees. Patient Education:   [x]  HEP/Education Completed: Plan of Care, Goals, quad sets, SLR BLEs. therap pool consent and tour given   [employer] Access Code:  []  No new Education completed  []  Reviewed Prior HEP      [x]  Patient verbalized and/or demonstrated understanding of education provided. []  Patient unable to verbalize and/or demonstrate understanding of education provided. Will continue education.   []  Barriers to learning: PLAN:  Treatment Recommendations: Strengthening, Range of Motion, Balance Training, Functional Mobility Training, Transfer Training, Gait Training, Stair Training, Neuromuscular Re-education, Manual Therapy - Soft Tissue Mobilization, Pain Management, Home Exercise Program, Patient Education, Aquatics and Modalities    [x]  Plan of care initiated. Plan to see patient 2 times per week for 8 weeks to address the treatment planned outlined above.   []  Continue with current plan of care  []  Modify plan of care as follows:    []  Hold pending physician visit  []  Discharge    Time In 1305   Time Out 1350   Timed Code Minutes: 10 min   Total Treatment Time: 45 min     Electronically Signed by: Rudy Ramirez PT

## 2022-01-03 ENCOUNTER — HOSPITAL ENCOUNTER (OUTPATIENT)
Dept: PHYSICAL THERAPY | Age: 67
Setting detail: THERAPIES SERIES
Discharge: HOME | End: 2022-01-03
Payer: MEDICARE

## 2022-01-03 PROCEDURE — 97110 THERAPEUTIC EXERCISES: CPT

## 2022-01-03 PROCEDURE — 97035 APP MDLTY 1+ULTRASOUND EA 15: CPT

## 2022-01-03 NOTE — PROGRESS NOTES
[address] [facility]  PHYSICAL THERAPY  [] EVALUATION  [x] DAILY NOTE (LAND) [] DAILY NOTE (AQUATIC ) [] PROGRESS NOTE [] DISCHARGE NOTE    [x] OUTPATIENT REHABILITATION CENTER [facility]   [] [place] [address]    [] [facility]   [] Sergio Goldstein    Date: 1/3/2022  Patient Name:  Fredi William  : 1955  MRN: [id number]  CSN: [id number]    Referring Practitioner Ramon Cortez MD   Diagnosis M17.) bilateral primary osteoarthritis of knees    Treatment Diagnosis Left knee pain with difficulty walking, negotiating stair steps, BLE weakness,    Date of Evaluation 21    Additional Pertinent History HTN, urinaryincontinence- urgency,       Functional Outcome Measure Used LE Functional Index   Functional Outcome Score 60/80 (21) initial evaluation      Insurance: Primary: Payor: [employer] /  /  / ,   Secondary:    Authorization Information: PRE CERTIFICATION REQUIRED: NO  INSURANCE THERAPY BENEFIT:  UNLIMITED VISITS BASED ON MEDICAL NECESSITY   AQUATIC THERAPY COVERED: YES  MODALITIES COVERED:  YES, YES MASSAGE  TELEHEALTH COVERED: NO  REFERENCE NUMBER: [id number]   Visit # 2, 2/10 for progress note   Visits Allowed: Based on medical necessity   Recertification Date:    Physician Follow-Up: Follow up in 3 months, 3/14/2022 @9:50 am.    Physician Orders: Quad and hamstring strengthening, gait training and other modalities 2x a week for 6-8 weeks. History of Present Illness: Patient reports of bilateral knee pain. Left knee greater than right. Noting increased pain in the summer at her left knee with difficulty with stair steps, stepping in and out of a boat. Notes pain has increased in last 2 months as well as weakness at her left knee >right knee. Notes difficulty more now with stair steps, walking, getting in/out of car. Unable to stoop/squat, kneeling, diffficulty getting up from floor. Difficulty with bleacher steps to attend basketball games. Notes pain location behind left knee with sitting, standing/walking notes pain in knee joint and anteriomedical knee. Notes not so much with standing but sit<>stand transfers from seated positions. To manage pain: Ibuprofen, But now on Mobic daily. Changes position after prolonged sitting and need to stand up to stretch left knee out. Paces activities. No difficulty sleeping. 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 SUBJECTIVE:  Patient states her knee has been feeling better and feels the Mobic has been helping. Rates current pain 0/10 and reports compliance with HEP. TREATMENT   Precautions: Left knee OA>right knee OA   Pain: 0/10 left knee, right knee 0/10     X in shaded column indicates activity completed today   Modalities Parameters/  Location  Notes   US 3 MHz 0.8 w/cm2 20% pulsed for 8 min L med/lat knee x                Manual Therapy Time/Technique  Notes                     Exercise/Intervention   Notes   Quad set  10x 5 sec x    Straight Leg Raises (quad set first and then SLR)  2x5  x    heelslides 10x  x    Hip add/ abd with ball/band 10x 5 sec x Orange           Seated HS stretch 3x 15 sec x    Seated LAQ 2x5  x                  Ab bracing 10x 5 sec x With LE exercises   therap pool consent, general information, pool tour completed                Specific Interventions Next Treatment: US pulsed 3.3 mhz at 0.8 w/cm2 for 8 minutes to left knee medial and lateral, posterior knee. therap ex for ROM, strengthening, balance, Therapeutic pool for gait training, strengthening, ROM bilateral knees/hips, core. Focusing on left knee. Activity/Treatment Tolerance:  [x]  Patient tolerated treatment well  []  Patient limited by fatigue  []  Patient limited by pain   []  Patient limited by medical complications  []  Other:     Assessment: Patient completed exercises as listed above working on ROM and strength.   Initiated US per POC to L med/lat knee x 8 min. Added ab bracing, hip abd/add, HS stretch, heelsides, seated HS stretch and LAQ to program to progress. Provided cues for correct exercise technique during treatment. Will continue to progress as tolerated by patient per POC. Goals    Patient Goal: To be able to walk 1 mile without knee pain and attend [employer] basketball games with less knee pain negotiating bleacher steps. Be able to get in car with less pain, Be able to get in/out of boat with more ease with less assistance. Short Term Goals: 4 weeks  1. Patient to report of decrease left knee pain from 4-5/10 with walking, sit<>stand from standard chair. 2. Patient to demonstrate increased left knee ROM from 12 degrees to 110 degrees to 5 degrees to 115 degrees knee flexion. 3. Patient to demonstrate progression with LE strength: LLE from 4-/5 to 4/5, RLE 4/5 to 5/5 with improved stability through knees and hips with walking, stair steps, partial squats. 4. Patient to demonstrate increased balance  SLS 10-12 seconds to 15 seconds right/left SLS with improved balance with walking. 5. Patient to demonstrate improved walking patter with more even wt shift with increased stance/wb through LLE without pain with walking. Long Term Goals: 8 weeks  1, LE functional index from 60/80 to 65/80  2. Patient independent with HEP with follow through for strength, ROM, balance, core strengthening, pain management bilateral knees. Patient Education:   [x]  HEP/Education Completed: Patient given updated HO and orange band for HEP.  [employer] Access Code:  []  No new Education completed  []  Reviewed Prior HEP      [x]  Patient verbalized and/or demonstrated understanding of education provided. []  Patient unable to verbalize and/or demonstrate understanding of education provided. Will continue education.   []  Barriers to learning:     PLAN:  Treatment Recommendations: Strengthening, Range of Motion, Balance Training, Functional Mobility Training, Transfer Training, Gait Training, Stair Training, Neuromuscular Re-education, Manual Therapy - Soft Tissue Mobilization, Pain Management, Home Exercise Program, Patient Education, Aquatics and Modalities    []  Plan of care initiated. Plan to see patient 2 times per week for 8 weeks to address the treatment planned outlined above.   [x]  Continue with current plan of care  []  Modify plan of care as follows:    []  Hold pending physician visit  []  Discharge    Time In 1000   Time Out 1045   Timed Code Minutes: 45 min   Total Treatment Time: 45 min     Electronically Signed by: Ml Dickson PTA

## 2022-01-06 ENCOUNTER — HOSPITAL ENCOUNTER (OUTPATIENT)
Dept: PHYSICAL THERAPY | Age: 67
Setting detail: THERAPIES SERIES
Discharge: HOME | End: 2022-01-06
Payer: MEDICARE

## 2022-01-06 PROCEDURE — 97113 AQUATIC THERAPY/EXERCISES: CPT

## 2022-01-06 NOTE — PROGRESS NOTES
[facility]  PHYSICAL THERAPY  [] EVALUATION  [] DAILY NOTE (LAND) [x] DAILY NOTE (AQUATIC ) [] PROGRESS NOTE [] DISCHARGE NOTE    [x] OUT[facility] [place]   [] [place] [address]    [] [facility]   [] Cheikh Santiago    Date: 2022  Patient Name:  Anay King  : 1955  MRN: [id number]  CSN: [id number]    Referring Practitioner Eliz Cr MD   Diagnosis M17.) bilateral primary osteoarthritis of knees    Treatment Diagnosis Left knee pain with difficulty walking, negotiating stair steps, BLE weakness,    Date of Evaluation 21    Additional Pertinent History HTN, urinaryincontinence- urgency,       Functional Outcome Measure Used LE Functional Index   Functional Outcome Score 60/80 (21) initial evaluation      Insurance: Primary: Payor: MEDICARE /  /  / ,   Secondary:    Authorization Information: PRE CERTIFICATION REQUIRED: NO  INSURANCE THERAPY BENEFIT:  UNLIMITED VISITS BASED ON MEDICAL NECESSITY   AQUATIC THERAPY COVERED: YES  MODALITIES COVERED:  YES, YES MASSAGE  TELEHEALTH COVERED: NO  REFERENCE NUMBER: [id number]   Visit # 3, 3/10 for progress note   Visits Allowed: Based on medical necessity   Recertification Date: 3/63/5869   Physician Follow-Up: Follow up in 3 months, 3/14/2022 @9:50 am.    Physician Orders: Quad and hamstring strengthening, gait training and other modalities 2x a week for 6-8 weeks. History of Present Illness: Patient reports of bilateral knee pain. Left knee greater than right. Noting increased pain in the summer at her left knee with difficulty with stair steps, stepping in and out of a boat. Notes pain has increased in last 2 months as well as weakness at her left knee >right knee. Notes difficulty more now with stair steps, walking, getting in/out of car. Unable to stoop/squat, kneeling, diffficulty getting up from floor. Difficulty with bleacher steps to attend basketball games.  Notes pain location behind left knee with sitting, standing/walking notes pain in knee joint and anteriomedical knee. Notes not so much with standing but sit<>stand transfers from seated positions. To manage pain: Ibuprofen, But now on Mobic daily. Changes position after prolonged sitting and need to stand up to stretch left knee out. Paces activities. No difficulty sleeping. 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 SUBJECTIVE:  Patient states her knees have been feeling better and feels the Mobic has been helping. Rates current pain 0/10 today in B knees but does notice some soreness in lower back with some of the new exercises. Reports compliance with HEP. AQUATICS TREATMENT   Precautions:    Pain: 0/10 B knees    X in shaded column indicates activity completed today   Exercise/Intervention Sets/Sec  Notes   Walk Forward 2 laps  x    Walk Backward 2 laps  x    Walk Sideways 2 laps  x           Lower Extremity Exercises:       Heel/Toe Raises x10  x    Marches x10  x    Squats x10  x    3 Way Hip x10 ea  x    Hamstring Curls x10  x    Lunges       Step-Ups              Lower Extremity Stretches:              Seated Exercises:              Upper Extremity Exercises:       Shoulder Flexion       Shoulder ABD/ADD       Shoulder Horizontal ABD/ADD       Shoulder IR/ER       Shoulder Circles       Shoulder Shrugs       Rows       Bicep Curls              Upper Extremity Stretches:              Balance:              Dynamic Gait:              [place]: In 4'10\" section   Hang 3 min  x    Bicycle 1 min  x    Hip ABD/ADD 1 min  x    Hip Flex/Ext 1 min  x      Specific Interventions Next Treatment: US pulsed 3.3 mhz at 0.8 w/cm2 for 8 minutes to left knee medial and lateral, posterior knee. therap ex for ROM, strengthening, balance, Therapeutic pool for gait training, strengthening, ROM bilateral knees/hips, core. Focusing on left knee.      Activity/Treatment Tolerance:  [x]  Patient tolerated treatment well  []  Patient limited by fatigue  []  Patient limited by pain   []  Patient limited by medical complications  []  Other:     Assessment:  Initiated aquatics as listed above working on LE flexibility and strength. Patient did well with new exercises without complaints of any pain in knees or the back. Provided cues for correct exercise technique during treatment today. Ended with noodle exercises and hang. Will continue to progress as tolerated by patient per POC. Goals    Patient Goal: To be able to walk 1 mile without knee pain and attend [employer] games with less knee pain negotiating bleacher steps. Be able to get in car with less pain, Be able to get in/out of boat with more ease with less assistance. Short Term Goals: 4 weeks  1. Patient to report of decrease left knee pain from 4-5/10 with walking, sit<>stand from standard chair. 2. Patient to demonstrate increased left knee ROM from 12 degrees to 110 degrees to 5 degrees to 115 degrees knee flexion. 3. Patient to demonstrate progression with LE strength: LLE from 4-/5 to 4/5, RLE 4/5 to 5/5 with improved stability through knees and hips with walking, stair steps, partial squats. 4. Patient to demonstrate increased balance  SLS 10-12 seconds to 15 seconds right/left SLS with improved balance with walking. 5. Patient to demonstrate improved walking patter with more even wt shift with increased stance/wb through LLE without pain with walking. Long Term Goals: 8 weeks  1, LE functional index from 60/80 to 65/80  2. Patient independent with HEP with follow through for strength, ROM, balance, core strengthening, pain management bilateral knees. Patient Education:   [x]  HEP/Education Completed: Patient given updated HO and orange band for HEP.    [employer] Access Code:  []  No new Education completed  []  Reviewed Prior HEP      [x]  Patient verbalized and/or demonstrated understanding of education provided. []  Patient unable to verbalize and/or demonstrate understanding of education provided. Will continue education. []  Barriers to learning:     PLAN:  Treatment Recommendations: Strengthening, Range of Motion, Balance Training, Functional Mobility Training, Transfer Training, Gait Training, Stair Training, Neuromuscular Re-education, Manual Therapy - Soft Tissue Mobilization, Pain Management, Home Exercise Program, Patient Education, Aquatics and Modalities    []  Plan of care initiated. Plan to see patient 2 times per week for 8 weeks to address the treatment planned outlined above.   [x]  Continue with current plan of care  []  Modify plan of care as follows:    []  Hold pending physician visit  []  Discharge    Time In 1015   Time Out 1045   Timed Code Minutes: 30 min   Total Treatment Time: 30 min     Electronically Signed by: Sherrie Harrell PTA

## 2022-01-10 ENCOUNTER — HOSPITAL ENCOUNTER (OUTPATIENT)
Dept: PHYSICAL THERAPY | Age: 67
Setting detail: THERAPIES SERIES
Discharge: HOME | End: 2022-01-10
Payer: MEDICARE

## 2022-01-10 PROCEDURE — 97110 THERAPEUTIC EXERCISES: CPT

## 2022-01-10 NOTE — PROGRESS NOTES
[address] [facility]  PHYSICAL THERAPY  [] EVALUATION  [x] DAILY NOTE (LAND) [] DAILY NOTE (AQUATIC ) [] PROGRESS NOTE [] DISCHARGE NOTE    [x] OUTPATIENT RE[facility]AB[facility] - [place]   [] [place] [address]    [] [facility]   [] Augustine Mortensen    Date: 1/10/2022  Patient Name:  Fuentes Ratliff  : 1955  MRN: [id number]  CSN: [id number]    Referring Practitioner Nilda Foster MD   Diagnosis M17.) bilateral primary osteoarthritis of knees    Treatment Diagnosis Left knee pain with difficulty walking, negotiating stair steps, BLE weakness,    Date of Evaluation 21    Additional Pertinent History HTN, urinaryincontinence- urgency,       Functional Outcome Measure Used LE Functional Index   Functional Outcome Score 60/80 (21) initial evaluation      Insurance: Primary: Payor: MEDICARE /  /  / ,   Secondary:    Authorization Information: PRE CERTIFICATION REQUIRED: NO  INSURANCE THERAPY BENEFIT:  UNLIMITED VISITS BASED ON MEDICAL NECESSITY   AQUATIC THERAPY COVERED: YES  MODALITIES COVERED:  YES, YES MASSAGE  TELEHEALTH COVERED: NO  REFERENCE NUMBER: [id number]   Visit # 4, 4/10 for progress note   Visits Allowed: Based on medical necessity   Recertification Date: [id number]   Physician Follow-Up: Follow up in 3 months, 3/14/2022 @9:50 am.    Physician Orders: Quad and hamstring strengthening, gait training and other modalities 2x a week for 6-8 weeks. History of Present Illness: Patient reports of bilateral knee pain. Left knee greater than right. Noting increased pain in the summer at her left knee with difficulty with stair steps, stepping in and out of a boat. Notes pain has increased in last 2 months as well as weakness at her left knee >right knee. Notes difficulty more now with stair steps, walking, getting in/out of car. Unable to stoop/squat, kneeling, diffficulty getting up from floor. Difficulty with bleacher steps to attend basketball games. balance, Therapeutic pool for gait training, strengthening, ROM bilateral knees/hips, core. Focusing on left knee. Activity/Treatment Tolerance:  [x]  Patient tolerated treatment well  []  Patient limited by fatigue  []  Patient limited by pain   []  Patient limited by medical complications  []  Other:     Assessment: Held on US due to 0/10 pain at left knee. Added seated leg curls with resistance band and sit<>stand transition with cushion in chair. Added sidelying hip abduction. Ex tolerated well without pain at knees. Stat. Bicycle at end of session. Progressing well with ex with cues provided for proper technique. 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 Be able to get in car with less pain, Be able to get in/out of boat with more ease with less assistance. Short Term Goals: 4 weeks  1. Patient to report of decrease left knee pain from 4-5/10 with walking, sit<>stand from standard chair. 2. Patient to demonstrate increased left knee ROM from 12 degrees to 110 degrees to 5 degrees to 115 degrees knee flexion. 3. Patient to demonstrate progression with LE strength: LLE from 4-/5 to 4/5, RLE 4/5 to 5/5 with improved stability through knees and hips with walking, stair steps, partial squats. 4. Patient to demonstrate increased balance  SLS 10-12 seconds to 15 seconds right/left SLS with improved balance with walking. 5. Patient to demonstrate improved walking patter with more even wt shift with increased stance/wb through LLE without pain with walking. Long Term Goals: 8 weeks  1, LE functional index from 60/80 to 65/80  2. Patient independent with HEP with follow through for strength, ROM, balance, core strengthening, pain management bilateral knees.        Patient Education:   [x]  HEP/Education Completed: Patient given updated HO with sidelying hip abduction, seated leg curl and sit<>stand transitions ex.  [employer] Access Code:  []  No new Education completed  []  Reviewed Prior HEP      [x]  Patient verbalized and/or demonstrated understanding of education provided. []  Patient unable to verbalize and/or demonstrate understanding of education provided. Will continue education. []  Barriers to learning:     PLAN:  Treatment Recommendations: Strengthening, Range of Motion, Balance Training, Functional Mobility Training, Transfer Training, Gait Training, Stair Training, Neuromuscular Re-education, Manual Therapy - Soft Tissue Mobilization, Pain Management, Home Exercise Program, Patient Education, Aquatics and Modalities    []  Plan of care initiated. Plan to see patient 2 times per week for 8 weeks to address the treatment planned outlined above.   [x]  Continue with current plan of care  []  Modify plan of care as follows:    []  Hold pending physician visit  []  Discharge    Time In 1042   Time Out 1120   Timed Code Minutes: 38   Total Treatment Time: 38     Electronically Signed by: Andre Recio PT

## 2022-01-12 ENCOUNTER — HOSPITAL ENCOUNTER (OUTPATIENT)
Dept: PHYSICAL THERAPY | Age: 67
Setting detail: THERAPIES SERIES
Discharge: HOME | End: 2022-01-12
Payer: MEDICARE

## 2022-01-12 PROCEDURE — 97113 AQUATIC THERAPY/EXERCISES: CPT

## 2022-01-12 NOTE — PROGRESS NOTES
[address] [facility]  PHYSICAL THERAPY  [] EVALUATION  [] DAILY NOTE (LAND) [x] DAILY NOTE (AQUATIC ) [] PROGRESS NOTE [] DISCHARGE NOTE    [x] [facility] [place]   [] [place] [address]    [] [facility]   [] Chiekh Santiago    Date: 2022  Patient Name:  Anay King  : 1955  MRN: [id number]  CSN: [id number]    Referring Practitioner Eliz Cr MD   Diagnosis M17.) bilateral primary osteoarthritis of knees    Treatment Diagnosis Left knee pain with difficulty walking, negotiating stair steps, BLE weakness,    Date of Evaluation 21    Additional Pertinent History HTN, urinaryincontinence- urgency,       Functional Outcome Measure Used LE Functional Index   Functional Outcome Score 60/80 (21) initial evaluation      Insurance: Primary: Payor: MEDICARE /  /  / ,   Secondary:    Authorization Information: PRE CERTIFICATION REQUIRED: NO  INSURANCE THERAPY BENEFIT:  UNLIMITED VISITS BASED ON MEDICAL NECESSITY   AQUATIC THERAPY COVERED: YES  MODALITIES COVERED:  YES, YES MASSAGE  TELEHEALTH COVERED: NO  REFERENCE NUMBER: [id number]   Visit # 5, 5/10 for progress note   Visits Allowed: Based on medical necessity   Recertification Date:    Physician Follow-Up: Follow up in 3 months, 3/14/2022 @9:50 am.    Physician Orders: Quad and hamstring strengthening, gait training and other modalities 2x a week for 6-8 weeks. History of Present Illness: Patient reports of bilateral knee pain. Left knee greater than right. Noting increased pain in the summer at her left knee with difficulty with stair steps, stepping in and out of a boat. Notes pain has increased in last 2 months as well as weakness at her left knee >right knee. Notes difficulty more now with stair steps, walking, getting in/out of car. Unable to stoop/squat, kneeling, diffficulty getting up from floor. Difficulty with bleacher steps to attend basketball games. Notes pain location behind left knee with sitting, standing/walking notes pain in knee joint and anteriomedical knee. Notes not so much with standing but sit<>stand transfers from seated positions. To manage pain: Ibuprofen, But now on Mobic daily. Changes position after prolonged sitting and need to stand up to stretch left knee out. Paces activities. No difficulty sleeping. 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 SUBJECTIVE:  Patient states she is feeling good and is not having any pain. Is able to do more at home. AQUATICS TREATMENT   Precautions:    Pain: 0/10 B knees    X in shaded column indicates activity completed today   Exercise/Intervention Sets/Sec  Notes   Walk Forward 2 laps  x    Walk Backward 2 laps  x    Walk Sideways 2 laps  x           Lower Extremity Exercises:       Heel/Toe Raises x15  x    Marches x15  x    Squats x15  x    3 Way Hip x15 ea  x    Hamstring Curls x15  x    Lunges       Step-Ups 10x  x 4'10\" steps          Lower Extremity Stretches:       Hamstring and calf stretch 3 20 sec x 4'10\" steps   Seated Exercises:              Upper Extremity Exercises:       Shoulder Flexion       Shoulder ABD/ADD       Shoulder Horizontal ABD/ADD       Shoulder IR/ER       Shoulder Circles       Shoulder Shrugs       Rows       Bicep Curls              Upper Extremity Stretches:              Balance:              Dynamic Gait:              Deep Water: In 4'10\" section   Hang 3 min  x    Bicycle 2 min  x    Hip ABD/ADD 1 min  x    Hip Flex/Ext 1 min  x      Specific Interventions Next Treatment: US pulsed 3.3 mhz at 0.8 w/cm2 for 8 minutes to left knee medial and lateral, posterior knee. therap ex for ROM, strengthening, balance, Therapeutic pool for gait training, strengthening, ROM bilateral knees/hips, core. Focusing on left knee.      Activity/Treatment Tolerance:  [x]  Patient tolerated treatment well  []  Patient limited by fatigue  []  Patient limited by pain   []  Patient limited by medical complications  []  Other:     Assessment:  Flor Reynoso had no pain throughout session. Increased reps to 15 on most exercises today. Goals    Patient Goal: To be able to walk 1 mile without knee pain and attend [employer] basketball games with less knee pain negotiating bleacher steps. Be able to get in car with less pain, Be able to get in/out of boat with more ease with less assistance. Short Term Goals: 4 weeks  1. Patient to report of decrease left knee pain from 4-5/10 with walking, sit<>stand from standard chair. 2. Patient to demonstrate increased left knee ROM from 12 degrees to 110 degrees to 5 degrees to 115 degrees knee flexion. 3. Patient to demonstrate progression with LE strength: LLE from 4-/5 to 4/5, RLE 4/5 to 5/5 with improved stability through knees and hips with walking, stair steps, partial squats. 4. Patient to demonstrate increased balance  SLS 10-12 seconds to 15 seconds right/left SLS with improved balance with walking. 5. Patient to demonstrate improved walking patter with more even wt shift with increased stance/wb through LLE without pain with walking. Long Term Goals: 8 weeks  1, LE functional index from 60/80 to 65/80  2. Patient independent with HEP with follow through for strength, ROM, balance, core strengthening, pain management bilateral knees. Patient Education:   [x]  HEP/Education Completed: Stretches at steps, step ups   [address] Access Code:  []  No new Education completed  []  Reviewed Prior HEP      [x]  Patient verbalized and/or demonstrated understanding of education provided. []  Patient unable to verbalize and/or demonstrate understanding of education provided. Will continue education.   []  Barriers to learning:     PLAN:  Treatment Recommendations: Strengthening, Range of Motion, Balance Training, Functional Mobility Training, Transfer Training, Gait Training, Stair Training, Neuromuscular Re-education, Manual Therapy - Soft Tissue Mobilization, Pain Management, Home Exercise Program, Patient Education, Aquatics and Modalities    []  Plan of care initiated. Plan to see patient 2 times per week for 8 weeks to address the treatment planned outlined above.   [x]  Continue with current plan of care  []  Modify plan of care as follows:    []  Hold pending physician visit  []  Discharge    Time In 0800   Time Out 0845   Timed Code Minutes: 45 min   Total Treatment Time: 45 min     Electronically Signed by: Tiffany Ugarte PT

## 2022-01-13 ENCOUNTER — APPOINTMENT (OUTPATIENT)
Dept: PHYSICAL THERAPY | Age: 67
End: 2022-01-13
Payer: MEDICARE

## 2022-01-17 ENCOUNTER — HOSPITAL ENCOUNTER (OUTPATIENT)
Dept: PHYSICAL THERAPY | Age: 67
Setting detail: THERAPIES SERIES
Discharge: HOME | End: 2022-01-17
Payer: MEDICARE

## 2022-01-17 PROCEDURE — 97110 THERAPEUTIC EXERCISES: CPT

## 2022-01-17 NOTE — PROGRESS NOTES
[facility]  PHYSICAL THERAPY  [] EVALUATION  [x] DAILY NOTE (LAND) [] DAILY NOTE (AQUATIC ) [] PROGRESS NOTE [] DISCHARGE NOTE    [x] [facility] [place]   [] Merlin     [] [facility]   [] [facility]    Date: 2022  Patient Name:  Sagar Tyson  : 1955  MRN: [id number]  CSN: [id number]    Referring Practitioner Gen Frey MD   Diagnosis M17.) bilateral primary osteoarthritis of knees    Treatment Diagnosis Left knee pain with difficulty walking, negotiating stair steps, BLE weakness,    Date of Evaluation 21    Additional Pertinent History HTN, urinaryincontinence- urgency,       Functional Outcome Measure Used LE Functional Index   Functional Outcome Score 60/80 (21) initial evaluation      Insurance: Primary: Payor: MEDICARE /  /  / ,   Secondary:    Authorization Information: PRE CERTIFICATION REQUIRED: NO  INSURANCE THERAPY BENEFIT:  UNLIMITED VISITS BASED ON MEDICAL NECESSITY   AQUATIC THERAPY COVERED: YES  MODALITIES COVERED:  YES, YES MASSAGE  TELEHEALTH COVERED: NO  REFERENCE NUMBER: [id number]   Visit # 6, 6/10 for progress note   Visits Allowed: Based on medical necessity   Recertification Date: [id number]   Physician Follow-Up: Follow up in 3 months, 3/14/2022 @9:50 am.    Physician Orders: Quad and hamstring strengthening, gait training and other modalities 2x a week for 6-8 weeks. History of Present Illness: Patient reports of bilateral knee pain. Left knee greater than right. Noting increased pain in the summer at her left knee with difficulty with stair steps, stepping in and out of a boat. Notes pain has increased in last 2 months as well as weakness at her left knee >right knee. Notes difficulty more now with stair steps, walking, getting in/out of car. Unable to stoop/squat, kneeling, diffficulty getting up from floor. Difficulty with bleacher steps to attend basketball games. Notes pain location behind left knee with sitting, standing/walking notes pain in knee joint and anteriomedical knee. Notes not so much with standing but sit<>stand transfers from seated positions. To manage pain: Ibuprofen, But now on Mobic daily. Changes position after prolonged sitting and need to stand up to stretch left knee out. Paces activities. No difficulty sleeping. 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 She has not had to take pain medication since last Wednesday. Her knees have been feeling pretty good. Notes soreness from negotiating more stair steps than usual when babysitting/carrying a baby last Thursday. TREATMENT   Precautions: Left knee OA>right knee OA   Pain: 0/10 left knee, right knee 0/10     X in shaded column indicates activity completed today   Modalities Parameters/  Location  Notes   US 3 MHz 0.8 w/cm2 20% pulsed for 8 min L med/lat knee                 Manual Therapy Time/Technique  Notes                     Exercise/Intervention   Notes   Quad set  10x 5 sec     Straight Leg Raises (quad set first and then SLR)  2x5      heelslides 10x      Hip  abd with l/band 15x 5 sec  Orange    sidleying hip abduction  10x                                         Seated HS stretch 3x 15 sec x    Seated LAQ 15x 2# x    Seated leg curl  15x Orange band x           Sit <>stand hinging from hips from chair with cushion in it 10x  x    Standing:       Bilateral heelraises with light touch of one hand on // bar 10x  x    3 way hip exercises in // bars light touch of one hand on // bar 10x  x           Balance:  Tandem stance 30 seconds  x                      Single leg stance 10 seconds  x                                              Stat.  Bicycle Matrix  Seat 8  5 min Load 2 x           Ab bracing 10x 5 sec x With LE exercises   therap pool consent, general information, pool tour completed                Specific Interventions Next Treatment: US pulsed 3.3 mhz at 0.8 w/cm2 for 8 minutes to left knee medial and lateral, posterior knee. therap ex for ROM, strengthening, balance, Therapeutic pool for gait training, strengthening, ROM bilateral knees/hips, core. Focusing on left knee. Activity/Treatment Tolerance:  [x]  Patient tolerated treatment well  []  Patient limited by fatigue  []  Patient limited by pain   []  Patient limited by medical complications  []  Other:     Assessment: Patient progressed in Ex program and noted weakness at hips, knees and ankles with 3 way hip ex, heelraises. RLE >LLE. Tolerated all exercises without knee pain. Noting good progression with sit<>stand transition and seated ex for quads and hamstrings. Also added balance in tandem stance and SLS. Noted decreased balance with these ex with SLS only 10 seconds. 0/10 knee pain throughout session. Goals    Patient Goal: To be able to walk 1 mile without knee pain and attend [employer] basketball games with less knee pain negotiating bleacher steps. Be able to get in car with less pain, Be able to get in/out of boat with more ease with less assistance. Short Term Goals: 4 weeks  1. Patient to report of decrease left knee pain from 4-5/10 with walking, sit<>stand from standard chair. 2. Patient to demonstrate increased left knee ROM from 12 degrees to 110 degrees to 5 degrees to 115 degrees knee flexion. 3. Patient to demonstrate progression with LE strength: LLE from 4-/5 to 4/5, RLE 4/5 to 5/5 with improved stability through knees and hips with walking, stair steps, partial squats. 4. Patient to demonstrate increased balance  SLS 10-12 seconds to 15 seconds right/left SLS with improved balance with walking. 5. Patient to demonstrate improved walking patter with more even wt shift with increased stance/wb through LLE without pain with walking.      Long Term Goals: 8 weeks  1, LE functional index from 60/80 to 65/80  2. Patient independent with HEP with follow through for strength, ROM, balance, core strengthening, pain management bilateral knees. Patient Education:   [x]  HEP/Education Completed: Add standing ex: 2 way hip exercises in standing and heelraises. Watch technique. Do not raise leg too high for 2 way hips. Square pelvis. heelraises with wb through all 5 toes.  [employer] Access Code:  []  No new Education completed  []  Reviewed Prior HEP      [x]  Patient verbalized and/or demonstrated understanding of education provided. []  Patient unable to verbalize and/or demonstrate understanding of education provided. Will continue education. []  Barriers to learning:     PLAN:  Treatment Recommendations: Strengthening, Range of Motion, Balance Training, Functional Mobility Training, Transfer Training, Gait Training, Stair Training, Neuromuscular Re-education, Manual Therapy - Soft Tissue Mobilization, Pain Management, Home Exercise Program, Patient Education, Aquatics and Modalities    []  Plan of care initiated. Plan to see patient 2 times per week for 8 weeks to address the treatment planned outlined above.   [x]  Continue with current plan of care  []  Modify plan of care as follows:    []  Hold pending physician visit  []  Discharge    Time In 1135   Time Out 1213   Timed Code Minutes: 38   Total Treatment Time: 38     Electronically Signed by: Yared Bear PT

## 2022-01-20 ENCOUNTER — HOSPITAL ENCOUNTER (OUTPATIENT)
Dept: PHYSICAL THERAPY | Age: 67
Setting detail: THERAPIES SERIES
Discharge: HOME | End: 2022-01-20
Payer: MEDICARE

## 2022-01-20 PROCEDURE — 97113 AQUATIC THERAPY/EXERCISES: CPT

## 2022-01-20 NOTE — PROGRESS NOTES
[address] [facility]  PHYSICAL THERAPY  [] EVALUATION  [] DAILY NOTE (LAND) [x] DAILY NOTE (AQUATIC ) [] PROGRESS NOTE [] DISCHARGE NOTE    [x] [facility] [place]   [] [place] [address]    [] [facility]   [] Hayes Scott    Date: 2022  Patient Name:  Roseline Finch  : 1955  MRN: [id number]  CSN: [id number]    Referring Practitioner Freeman Wells MD   Diagnosis M17.) bilateral primary osteoarthritis of knees    Treatment Diagnosis Left knee pain with difficulty walking, negotiating stair steps, BLE weakness,    Date of Evaluation 21    Additional Pertinent History HTN, urinaryincontinence- urgency,       Functional Outcome Measure Used LE Functional Index   Functional Outcome Score 60/80 (21) initial evaluation      Insurance: Primary: Payor: MEDICARE /  /  / ,   Secondary:    Authorization Information: PRE CERTIFICATION REQUIRED: NO  INSURANCE THERAPY BENEFIT:  UNLIMITED VISITS BASED ON MEDICAL NECESSITY   AQUATIC THERAPY COVERED: YES  MODALITIES COVERED:  YES, YES MASSAGE  TELEHEALTH COVERED: NO  REFERENCE NUMBER: [id number]   Visit # 7, 7/10 for progress note   Visits Allowed: Based on medical necessity   Recertification Date:    Physician Follow-Up: Follow up in 3 months, 3/14/2022 @9:50 am.    Physician Orders: Quad and hamstring strengthening, gait training and other modalities 2x a week for 6-8 weeks. History of Present Illness: Patient reports of bilateral knee pain. Left knee greater than right. Noting increased pain in the summer at her left knee with difficulty with stair steps, stepping in and out of a boat. Notes pain has increased in last 2 months as well as weakness at her left knee >right knee. Notes difficulty more now with stair steps, walking, getting in/out of car. Unable to stoop/squat, kneeling, diffficulty getting up from floor. Difficulty with bleacher steps to attend basketball games. Notes pain location behind left knee with sitting, standing/walking notes pain in knee joint and anteriomedical knee. Notes not so much with standing but sit<>stand transfers from seated positions. To manage pain: Ibuprofen, But now on Mobic daily. Changes position after prolonged sitting and need to stand up to stretch left knee out. Paces activities. No difficulty sleeping. 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 Does notice increased soreness in lower back after land based exercises, but doesn't last long. Reports compliance with HEP. AQUATICS TREATMENT   Precautions:    Pain: 0/10 B knees    X in shaded column indicates activity completed today   Exercise/Intervention Sets/Sec  Notes   Walk Forward 3 laps  x    Walk Backward 3 laps  x    Walk Sideways 3 laps  x           Lower Extremity Exercises:       Heel/Toe Raises x15  x    Marches x15  x    Squats x15  x    3 Way Hip x15 ea  x    Hamstring Curls x15  x    Lunges       Step-Ups Fwd/Lat 15x fwd    10x lat x 4'10\" steps added lateral today          Lower Extremity Stretches:       Hamstring and calf stretch 3 20 sec x 4'10\" steps   Seated Exercises:              Upper Extremity Exercises:       Shoulder Flexion       Shoulder ABD/ADD       Shoulder Horizontal ABD/ADD       Shoulder IR/ER       Shoulder Circles       Shoulder Shrugs       Rows       Bicep Curls              Upper Extremity Stretches:              Balance:              Dynamic Gait:              Deep Water: In 4'10\" section   Hang 3 min  x    Bicycle 2 min  x    Hip ABD/ADD 1 min  x    Hip Flex/Ext 1 min  x      Specific Interventions Next Treatment: US pulsed 3.3 mhz at 0.8 w/cm2 for 8 minutes to left knee medial and lateral, posterior knee.  therap ex for ROM, strengthening, balance, Therapeutic pool for gait training, strengthening, ROM bilateral knees/hips, core. Focusing on left knee. Activity/Treatment Tolerance:  [x]  Patient tolerated treatment well  []  Patient limited by fatigue  []  Patient limited by pain   []  Patient limited by medical complications  []  Other:     Assessment: Patient completed aquatics as listed above working on ROM and strength. Added lateral step ups and patient tolerated increased reps. No complaints of increased pain during session. Will continue to progress as tolerated by patient per POC. 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 Be able to get in car with less pain, Be able to get in/out of boat with more ease with less assistance. Short Term Goals: 4 weeks  1. Patient to report of decrease left knee pain from 4-5/10 with walking, sit<>stand from standard chair. 2. Patient to demonstrate increased left knee ROM from 12 degrees to 110 degrees to 5 degrees to 115 degrees knee flexion. 3. Patient to demonstrate progression with LE strength: LLE from 4-/5 to 4/5, RLE 4/5 to 5/5 with improved stability through knees and hips with walking, stair steps, partial squats. 4. Patient to demonstrate increased balance  SLS 10-12 seconds to 15 seconds right/left SLS with improved balance with walking. 5. Patient to demonstrate improved walking patter with more even wt shift with increased stance/wb through LLE without pain with walking. Long Term Goals: 8 weeks  1, LE functional index from 60/80 to 65/80  2. Patient independent with HEP with follow through for strength, ROM, balance, core strengthening, pain management bilateral knees. Patient Education:   [x]  HEP/Education Completed: Stretches at steps, step ups   [address] Access Code:  []  No new Education completed  []  Reviewed Prior HEP      [x]  Patient verbalized and/or demonstrated understanding of education provided.   [] Patient unable to verbalize and/or demonstrate understanding of education provided. Will continue education. []  Barriers to learning:     PLAN:  Treatment Recommendations: Strengthening, Range of Motion, Balance Training, Functional Mobility Training, Transfer Training, Gait Training, Stair Training, Neuromuscular Re-education, Manual Therapy - Soft Tissue Mobilization, Pain Management, Home Exercise Program, Patient Education, Aquatics and Modalities    []  Plan of care initiated. Plan to see patient 2 times per week for 8 weeks to address the treatment planned outlined above.   [x]  Continue with current plan of care  []  Modify plan of care as follows:    []  Hold pending physician visit  []  Discharge    Time In 1005   Time Out 1045   Timed Code Minutes: 40 min   Total Treatment Time: 40 min     Electronically Signed by: Valente Seip, PTA

## 2022-01-24 ENCOUNTER — HOSPITAL ENCOUNTER (OUTPATIENT)
Dept: PHYSICAL THERAPY | Age: 67
Setting detail: THERAPIES SERIES
Discharge: HOME | End: 2022-01-24
Payer: MEDICARE

## 2022-01-24 PROCEDURE — 97110 THERAPEUTIC EXERCISES: CPT

## 2022-01-24 NOTE — PROGRESS NOTES
[address] [facility]  PHYSICAL THERAPY  [] EVALUATION  [x] DAILY NOTE (LAND) [] DAILY NOTE (AQUATIC ) [] PROGRESS NOTE [x] DISCHARGE NOTE    [x] [facility] [place]   [] [place] [address]    [] [facility]   [] Alyssa Frank    Date: 2022  Patient Name:  Osmin Agosto  : 1955  MRN: [id number]  CSN: [id number]    Referring Practitioner Janes Gutierrez MD   Diagnosis M17.) bilateral primary osteoarthritis of knees    Treatment Diagnosis Left knee pain with difficulty walking, negotiating stair steps, BLE weakness,    Date of Evaluation 21    Additional Pertinent History HTN, urinary incontinence- urgency,       Functional Outcome Measure Used LE Functional Index   Functional Outcome Score 60/80 (21) initial evaluation      Insurance: Primary: Payor: MEDICARE /  /  / ,   Secondary:    Authorization Information: PRE CERTIFICATION REQUIRED: NO  INSURANCE THERAPY BENEFIT:  UNLIMITED VISITS BASED ON MEDICAL NECESSITY   AQUATIC THERAPY COVERED: YES  MODALITIES COVERED:  YES, YES MASSAGE  TELEHEALTH COVERED: NO  REFERENCE NUMBER: [id number]   Visit # 8, 8/10 for progress note   Visits Allowed: Based on medical necessity   Recertification Date:    Physician Follow-Up: Follow up in 3 months, 3/14/2022 @9:50 am.    Physician Orders: Quad and hamstring strengthening, gait training and other modalities 2x a week for 6-8 weeks. History of Present Illness: Patient reports of bilateral knee pain. Left knee greater than right. Noting increased pain in the summer at her left knee with difficulty with stair steps, stepping in and out of a boat. Notes pain has increased in last 2 months as well as weakness at her left knee >right knee. Notes difficulty more now with stair steps, walking, getting in/out of car. Unable to stoop/squat, kneeling, difficulty getting up from floor. Difficulty with bleacher steps to attend basketball games. Notes pain location behind left knee with sitting, standing/walking notes pain in knee joint and anteriomedical knee. Notes not so much with standing but sit<>stand transfers from seated positions. To manage pain: Ibuprofen, But now on Mobic daily. Changes position after prolonged sitting and need to stand up to stretch left knee out. Paces activities. No difficulty sleeping. 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 SUBJECTIVE: Patient reports that her last pool session went well. She has not had to take pain medication since last Wednesday. Her knees have been feeling pretty good. Notes soreness from negotiating more stair steps than usual when babysitting/carrying a baby last Thursday.      TREATMENT   Precautions: Left knee OA>right knee OA   Pain: 0/10 left knee, right knee 0/10     X in shaded column indicates activity completed today   Modalities Parameters/  Location  Notes   US 3 MHz 0.8 w/cm2 20% pulsed for 8 min L med/lat knee                 Manual Therapy Time/Technique  Notes                     Exercise/Intervention   Notes   Quad set  10x 5 sec     Straight Leg Raises (quad set first and then SLR)  2x5      heel slides 10x      Hip  abd with l/band 15x 5 sec  [place]    side lying hip abduction  10x                                         Seated HS stretch 3x 15 sec x    Seated LAQ 15x 2# x    Seated leg curl  15x Orange band x           Sit <>stand hinging from hip from mat table 5  x    Standing:       Bilateral heel raises with light touch of one hand on // bar 10x  x    3 way hip exercises in // bars light touch of one hand on // bar 10x  x           Balance:  Tandem stance 30 seconds  x                      Single leg stance 10 seconds  x    Heel Cord Stretch off edge of // bars  3x20 sec  x    Rocker Board: taps/balance fwd/back and lateral  15 taps  30 sec bal x    4 inch step up forward/lateral: R/L CC  10  x Stat. Bicycle Matrix  Seat 8  5 min Load 1 x           Supine ab bracing with hip adduction (ball)  10x 5 sec x    Bridge with ball in between legs  10x3 sec  x             Specific Interventions Next Treatment: US pulsed 3.3 mhz at 0.8 w/cm2 for 8 minutes to left knee medial and lateral, posterior knee. therapy ex for ROM, strengthening, balance, Therapeutic pool for gait training, strengthening, ROM bilateral knees/hips, core. Focusing on left knee. Activity/Treatment Tolerance:  [x]  Patient tolerated treatment well  []  Patient limited by fatigue  []  Patient limited by pain   []  Patient limited by medical complications  []  Other:     Assessment: Progressed standing balance and strengthening exercises with patient tolerating very well. Patient did report a little soreness in left knee with step ups but no pain or lasting soreness. Added mat table hip adduction and bridge with good tolerance. Patient reporting no complains at end of session. Goals    Patient Goal: To be able to walk 1 mile without knee pain and attend [employer] basketball games with less knee pain negotiating bleacher steps. Be able to get in car with less pain, Be able to get in/out of boat with more ease with less assistance. Short Term Goals: 4 weeks  1. Patient to report of decrease left knee pain from 4-5/10 with walking, sit<>stand from standard chair. 2. Patient to demonstrate increased left knee ROM from 12 degrees to 110 degrees to 5 degrees to 115 degrees knee flexion. 3. Patient to demonstrate progression with LE strength: LLE from 4-/5 to 4/5, RLE 4/5 to 5/5 with improved stability through knees and hips with walking, stair steps, partial squats. 4. Patient to demonstrate increased balance  SLS 10-12 seconds to 15 seconds right/left SLS with improved balance with walking. 5. Patient to demonstrate improved walking patter with more even wt shift with increased stance/wb through LLE without pain with walking. Long Term Goals: 8 weeks  1, LE functional index from 60/80 to 65/80  2. Patient independent with HEP with follow through for strength, ROM, balance, core strengthening, pain management bilateral knees. Patient Education:   [x]  HEP/Education Completed: handout given for standing balance and exercises   Oskar Amrbiz  []  No new Education completed  []  Reviewed Prior HEP      [x]  Patient verbalized and/or demonstrated understanding of education provided. []  Patient unable to verbalize and/or demonstrate understanding of education provided. Will continue education. []  Barriers to learning:     PLAN:  Treatment Recommendations: Strengthening, Range of Motion, Balance Training, Functional Mobility Training, Transfer Training, Gait Training, Stair Training, Neuromuscular Re-education, Manual Therapy - Soft Tissue Mobilization, Pain Management, Home Exercise Program, Patient Education, Aquatics and Modalities    []  Plan of care initiated. Plan to see patient 2 times per week for 8 weeks to address the treatment planned outlined above.   [x]  Continue with current plan of care  []  Modify plan of care as follows:    []  Hold pending physician visit  []  Discharge    Time In 839   Time Out 919   Timed Code Minutes: 40   Total Treatment Time: 40     Electronically Signed by: Brooklyn Woods PTA

## 2022-01-27 ENCOUNTER — HOSPITAL ENCOUNTER (OUTPATIENT)
Dept: PHYSICAL THERAPY | Age: 67
Setting detail: THERAPIES SERIES
Discharge: HOME | End: 2022-01-27
Payer: MEDICARE

## 2022-01-27 PROCEDURE — 97113 AQUATIC THERAPY/EXERCISES: CPT

## 2022-01-27 NOTE — PROGRESS NOTES
[address] [facility]  PHYSICAL THERAPY  [] EVALUATION  [] DAILY NOTE (LAND) [x] DAILY NOTE (AQUATIC ) [] PROGRESS NOTE [] DISCHARGE NOTE    [x] [facility] [place]   [] [place] [address]    [] [facility]   [] Anthony Patino    Date: 2022  Patient Name:  Regi Mortensen  : 1955  MRN: [id number]  CSN: [id number]    Referring Practitioner Marie Munoz MD   Diagnosis M17.) bilateral primary osteoarthritis of knees    Treatment Diagnosis Left knee pain with difficulty walking, negotiating stair steps, BLE weakness,    Date of Evaluation 21    Additional Pertinent History HTN, urinaryincontinence- urgency,       Functional Outcome Measure Used LE Functional Index   Functional Outcome Score 60/80 (21) initial evaluation      Insurance: Primary: Payor: MEDICARE /  /  / ,   Secondary:    Authorization Information: PRE CERTIFICATION REQUIRED: NO  INSURANCE THERAPY BENEFIT:  UNLIMITED VISITS BASED ON MEDICAL NECESSITY   AQUATIC THERAPY COVERED: YES  MODALITIES COVERED:  YES, YES MASSAGE  TELEHEALTH COVERED: NO  REFERENCE NUMBER: [id number]   Visit # 9, 910 for progress note   Visits Allowed: Based on medical necessity   Recertification Date:    Physician Follow-Up: Follow up in 3 months, 3/14/2022 @9:50 am.    Physician Orders: Quad and hamstring strengthening, gait training and other modalities 2x a week for 6-8 weeks. History of Present Illness: Patient reports of bilateral knee pain. Left knee greater than right. Noting increased pain in the summer at her left knee with difficulty with stair steps, stepping in and out of a boat. Notes pain has increased in last 2 months as well as weakness at her left knee >right knee. Notes difficulty more now with stair steps, walking, getting in/out of car. Unable to stoop/squat, kneeling, diffficulty getting up from floor. Difficulty with bleacher steps to attend basketball games. Notes pain location behind left knee with sitting, standing/walking notes pain in knee joint and anteriomedical knee. Notes not so much with standing but sit<>stand transfers from seated positions. To manage pain: Ibuprofen, But now on Mobic daily. Changes position after prolonged sitting and need to stand up to stretch left knee out. Paces activities. No difficulty sleeping. 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 Does notice some soreness in knees with land based exercises, but the pain doesn't last long. Reports compliance with HEP. AQUATICS TREATMENT   Precautions:    Pain: 0/10 B knees    X in shaded column indicates activity completed today   Exercise/Intervention Sets/Sec  Notes   Walk Forward 3 laps  x    Walk Backward 3 laps  x    Walk Sideways 3 laps  x           Lower Extremity Exercises:       Heel/Toe Raises x15  x    Marches x15  x    Squats x15  x    3 Way Hip x15 ea  x    Hamstring Curls x15  x    Lunges       Step-Ups Fwd/Lat 15x fwd    15x lat x 4'10\" steps added lateral today          Lower Extremity Stretches:       Hamstring and calf stretch 3 20 sec x 4'10\" steps   Seated Exercises:              Upper Extremity Exercises:       Shoulder Flexion       Shoulder ABD/ADD       Shoulder Horizontal ABD/ADD       Shoulder IR/ER       Shoulder Circles       Shoulder Shrugs       Rows       Bicep Curls              Upper Extremity Stretches:              Balance:              Dynamic Gait:              [place]: In 4'10\" section   Hang 3 min  x    Bicycle 2 min  x    Hip ABD/ADD 1 min  x    Hip Flex/Ext 1 min  x      Specific Interventions Next Treatment: US pulsed 3.3 mhz at 0.8 w/cm2 for 8 minutes to left knee medial and lateral, posterior knee.  therap ex for ROM, strengthening, balance, Therapeutic pool for gait training, strengthening, ROM bilateral knees/hips, core. Focusing on left knee. Activity/Treatment Tolerance:  [x]  Patient tolerated treatment well  []  Patient limited by fatigue  []  Patient limited by pain   []  Patient limited by medical complications  []  Other:      Assessment: Patient completed aquatic exercises as listed above working on ROM and strength. Patient tolerated increased reps with lateral step ups today. No complaints of increased pain during session. Will continue to progress as tolerated by patient per POC. Goals    Patient Goal: To be able to walk 1 mile without knee pain and attend [employer] games with less knee pain negotiating bleacher steps. Be able to get in car with less pain, Be able to get in/out of boat with more ease with less assistance. Short Term Goals: 4 weeks  1. Patient to report of decrease left knee pain from 4-5/10 with walking, sit<>stand from standard chair. 2. Patient to demonstrate increased left knee ROM from 12 degrees to 110 degrees to 5 degrees to 115 degrees knee flexion. 3. Patient to demonstrate progression with LE strength: LLE from 4-/5 to 4/5, RLE 4/5 to 5/5 with improved stability through knees and hips with walking, stair steps, partial squats. 4. Patient to demonstrate increased balance  SLS 10-12 seconds to 15 seconds right/left SLS with improved balance with walking. 5. Patient to demonstrate improved walking patter with more even wt shift with increased stance/wb through LLE without pain with walking. Long Term Goals: 8 weeks  1, LE functional index from 60/80 to 65/80  2. Patient independent with HEP with follow through for strength, ROM, balance, core strengthening, pain management bilateral knees. Patient Education:   [x]  HEP/Education Completed: Stretches at steps, step ups   [address] Access Code:  []  No new Education completed  []  Reviewed Prior HEP      [x]  Patient verbalized and/or demonstrated understanding of education provided.   [] Patient unable to verbalize and/or demonstrate understanding of education provided. Will continue education. []  Barriers to learning:     PLAN:  Treatment Recommendations: Strengthening, Range of Motion, Balance Training, Functional Mobility Training, Transfer Training, Gait Training, Stair Training, Neuromuscular Re-education, Manual Therapy - Soft Tissue Mobilization, Pain Management, Home Exercise Program, Patient Education, Aquatics and Modalities    []  Plan of care initiated. Plan to see patient 2 times per week for 8 weeks to address the treatment planned outlined above.   [x]  Continue with current plan of care  []  Modify plan of care as follows:    []  Hold pending physician visit  []  Discharge    Time In 1130   Time Out 1205   Timed Code Minutes: 35 min   Total Treatment Time: 35 min     Electronically Signed by: Gogo Tarango PTA

## 2022-01-31 ENCOUNTER — HOSPITAL ENCOUNTER (OUTPATIENT)
Dept: PHYSICAL THERAPY | Age: 67
Setting detail: THERAPIES SERIES
Discharge: HOME | End: 2022-01-31
Payer: MEDICARE

## 2022-01-31 PROCEDURE — 97110 THERAPEUTIC EXERCISES: CPT

## 2022-01-31 NOTE — DISCHARGE SUMMARY
[address] [facility]  PHYSICAL THERAPY  [] EVALUATION  [x] DAILY NOTE (LAND) [] DAILY NOTE (AQUATIC ) [] PROGRESS NOTE [x] DISCHARGE NOTE    [x] OUTPATIENT REHABILITATION CENTER [facility]   [] [place] [address]    [] [facility]   [] Hayes Scott    Date: 2022  Patient Name:  Roseline Finch  : 1955  MRN: [id number]  CSN: [id number]    Referring Practitioner Freeman Wells MD   Diagnosis M17.) bilateral primary osteoarthritis of knees    Treatment Diagnosis Left knee pain with difficulty walking, negotiating stair steps, BLE weakness,    Date of Evaluation 21    Additional Pertinent History HTN, urinary incontinence- urgency,       Functional Outcome Measure Used LE Functional Index   Functional Outcome Score 60/80 (21) initial evaluation  63/80 22 Discharge       Insurance: Primary: Payor: Sukh Barrow /  /  / ,   Secondary:    Authorization Information: PRE CERTIFICATION REQUIRED: NO  INSURANCE THERAPY BENEFIT:  UNLIMITED VISITS BASED ON MEDICAL NECESSITY   AQUATIC THERAPY COVERED: YES  MODALITIES COVERED:  YES, YES MASSAGE  TELEHEALTH COVERED: NO  REFERENCE NUMBER: [id number]   Visit # 10, 10/10  for Discharge Note   Visits Allowed: Based on medical necessity   Recertification Date: [id number]   Physician Follow-Up: Follow up in 3 months, 3/14/2022 @9:50 am.    Physician Orders: Quad and hamstring strengthening, gait training and other modalities 2x a week for 6-8 weeks. History of Present Illness: Patient reports of bilateral knee pain. Left knee greater than right. Noting increased pain in the summer at her left knee with difficulty with stair steps, stepping in and out of a boat. Notes pain has increased in last 2 months as well as weakness at her left knee >right knee. Notes difficulty more now with stair steps, walking, getting in/out of car. Unable to stoop/squat, kneeling, difficulty getting up from floor.  Difficulty with bleacher steps to attend basketball games. Notes pain location behind left knee with sitting, standing/walking notes pain in knee joint and anteriomedical knee. Notes not so much with standing but sit<>stand transfers from seated positions. To manage pain: Ibuprofen, But now on Mobic daily. Changes position after prolonged sitting and need to stand up to stretch left knee out. Paces activities. No difficulty sleeping. 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 I can complete stair steps and sit<>stand from a chair with more ease due to strength and less pain. Patient understands her HEP to continue with. She does the ex 1x per day. Patient feels that Discharge is appropriate for today.       TREATMENT   Precautions: Left knee OA>right knee OA   Pain: 0/10 left knee, right knee 0/10     X in shaded column indicates activity completed today   Modalities Parameters/  Location  Notes   US 3 MHz 0.8 w/cm2 20% pulsed for 8 min L med/lat knee                 Manual Therapy Time/Technique  Notes                     Exercise/Intervention   Notes   Quad set  10x 5 sec     Straight Leg Raises (quad set first and then SLR)  2x5      heel slides 10x      Hip  abd with l/band 15x 5 sec  [place]    side lying hip abduction  10x                                         Seated HS stretch 3x 15 sec     Seated LAQ 15x 2#     Seated leg curl  15x Orange band            Sit <>stand hinging from hip from chair  5x  x    Standing:       Bilateral heel raises with light touch of one hand on // bar 15x  x    3 way hip exercises in // bars light touch of one hand on // bar 10x Orange band x           Balance:  Tandem stance 30 seconds  x                      Single leg stance 10 seconds  x    Heel Cord Stretch off edge of // bars  3x20 sec      Rocker Board: taps/balance fwd/back and lateral 15 taps  30 sec bal     4 inch step up forward/lateral: R/L CC  5x eccentric x                         Stat. Bicycle Matrix  Seat 8  5 min Load 3 x           Supine ab bracing with hip adduction (ball)  10x 5 sec     Bridge with ball in between legs  10x3 sec      Reassessment 15 minx        Specific Interventions Next Treatment: US pulsed 3.3 mhz at 0.8 w/cm2 for 8 minutes to left knee medial and lateral, posterior knee. therapy ex for ROM, strengthening, balance, Therapeutic pool for gait training, strengthening, ROM bilateral knees/hips, core. Focusing on left knee. Activity/Treatment Tolerance:  [x]  Patient tolerated treatment well  []  Patient limited by fatigue  []  Patient limited by pain   []  Patient limited by medical complications  []  Other:     Assessment: Reassessment. Refer to goal summary . All goals were met with exception of LEFI. It did improve to 63/80. Patient was issued HEP and revised it for progression in HEP. Patient to be discharged at this time. 0/10 pain throughout session. ROM WNLS right/left knee, STrenght WNLs. Ambulation with normal gait pattern. THERap pool ex at local facility    Goals    Patient Goal: To be able to walk 1 mile without knee pain and attend [employer] basketball games with less knee pain negotiating bleacher steps. Be able to get in car with less pain, Be able to get in/out of boat with more ease with less assistance. GOAL MET Patient is able to walk without knee pain, She is able to negotiate bleachers but is cautious. She has no pain and is able to negotiate these steps reciprocally now instead of one step at a time. Short Term Goals: 4 weeks  1. Patient to report of decrease left knee pain from 4-5/10 with walking, sit<>stand from standard chair. GOAL MET Average knee pain is 0/10. She is able to sit<>stand and walk with 0 pain. She does watch low surfaces like her sofa notes stiffness.  On a regular chair she feels fine with getting up and no stiffness/pain. 2. Patient to demonstrate increased left knee ROM from 12 degrees to 110 degrees to 5 degrees to 115 degrees knee flexion. GOAL MET Knee ROM Left: 0 degrees to 130 degrees flexion WNLS, Right knee WNLS. 3. Patient to demonstrate progression with LE strength: LLE from 4-/5 to 4/5, RLE 4/5 to 5/5 with improved stability through knees and hips with walking, stair steps, partial squats. GOAL MET STrength at hip muscles 4+/5, note decreased core, Knee strength right/left 5/5, Functional Strength she is able to sit<>stand and negotiate stair steps without pain and good technique. 4. Patient to demonstrate increased balance  SLS 10-12 seconds to 15 seconds right/left SLS with improved balance with walking. GOAL MET  SLS 30 seconds right/left LEs  5. Patient to demonstrate improved walking patter with more even wt shift with increased stance/wb through LLE without pain with walking. GOAL MET Note equal stance through LE when walking without an antalgic gait pattern. Long Term Goals: 8 weeks  1, LE functional index from 60/80 to 65/80  GOAL NOT MET LEFI 63/80   2. Patient independent with HEP with follow through for strength, ROM, balance, core strengthening, pain management bilateral knees. GOAL MET Independent with HEP. Patient Education:   [x]  HEP/Education Completed: handout given for standing balance and exercises progression, steps, 3 way hip with orange band. Sit<>stand, step ups eccentrically.  [employer] Access Code:  []  No new Education completed  [x]  Reviewed Prior HEP      [x]  Patient verbalized and/or demonstrated understanding of education provided. []  Patient unable to verbalize and/or demonstrate understanding of education provided. Will continue education.   []  Barriers to learning:     PLAN:  Treatment Recommendations: Strengthening, Range of Motion, Balance Training, Functional Mobility Training, Transfer Training, Gait Training, Stair Training, Neuromuscular Re-education, Manual Therapy - Soft Tissue Mobilization, Pain Management, Home Exercise Program, Patient Education, Aquatics and Modalities    []  Plan of care initiated. Plan to see patient 2 times per week for 8 weeks to address the treatment planned outlined above.   []  Continue with current plan of care  []  Modify plan of care as follows:    []  Hold pending physician visit  [x]  Discharge    Time In 0905   Time Out 0945   Timed Code Minutes: 40   Total Treatment Time: 40     Electronically Signed by: Kizzy Vela PT

## 2022-02-02 ENCOUNTER — OFFICE VISIT (OUTPATIENT)
Dept: FAMILY MEDICINE CLINIC | Age: 67
End: 2022-02-02
Payer: MEDICARE

## 2022-02-02 VITALS
WEIGHT: 288.8 LBS | SYSTOLIC BLOOD PRESSURE: 126 MMHG | OXYGEN SATURATION: 94 % | HEART RATE: 75 BPM | BODY MASS INDEX: 45.33 KG/M2 | TEMPERATURE: 97.5 F | HEIGHT: 67 IN | DIASTOLIC BLOOD PRESSURE: 76 MMHG | RESPIRATION RATE: 18 BRPM

## 2022-02-02 DIAGNOSIS — Z23 NEED FOR PNEUMOCOCCAL VACCINATION: ICD-10-CM

## 2022-02-02 DIAGNOSIS — I10 BENIGN ESSENTIAL HTN: ICD-10-CM

## 2022-02-02 DIAGNOSIS — D68.2 DYSFIBRINOGENEMIA (HCC): ICD-10-CM

## 2022-02-02 DIAGNOSIS — Z00.00 ROUTINE GENERAL MEDICAL EXAMINATION AT A HEALTH CARE FACILITY: Primary | ICD-10-CM

## 2022-02-02 DIAGNOSIS — N32.81 OAB (OVERACTIVE BLADDER): ICD-10-CM

## 2022-02-02 LAB
ALBUMIN SERPL-MCNC: 4.3 G/DL (ref 3.5–5.1)
ALP BLD-CCNC: 79 U/L (ref 38–126)
ALT SERPL-CCNC: 15 U/L (ref 11–66)
ANION GAP SERPL CALCULATED.3IONS-SCNC: 11 MEQ/L (ref 8–16)
AST SERPL-CCNC: 14 U/L (ref 5–40)
BASOPHILS # BLD: 1 %
BASOPHILS ABSOLUTE: 0.1 THOU/MM3 (ref 0–0.1)
BILIRUB SERPL-MCNC: 0.3 MG/DL (ref 0.3–1.2)
BUN BLDV-MCNC: 21 MG/DL (ref 7–22)
CALCIUM SERPL-MCNC: 9.1 MG/DL (ref 8.5–10.5)
CHLORIDE BLD-SCNC: 101 MEQ/L (ref 98–111)
CHOLESTEROL, TOTAL: 324 MG/DL (ref 100–199)
CO2: 27 MEQ/L (ref 23–33)
CREAT SERPL-MCNC: 1 MG/DL (ref 0.4–1.2)
EOSINOPHIL # BLD: 1.6 %
EOSINOPHILS ABSOLUTE: 0.1 THOU/MM3 (ref 0–0.4)
ERYTHROCYTE [DISTWIDTH] IN BLOOD BY AUTOMATED COUNT: 12.3 % (ref 11.5–14.5)
ERYTHROCYTE [DISTWIDTH] IN BLOOD BY AUTOMATED COUNT: 44.8 FL (ref 35–45)
GFR SERPL CREATININE-BSD FRML MDRD: 56 ML/MIN/1.73M2
GLUCOSE BLD-MCNC: 90 MG/DL (ref 70–108)
HCT VFR BLD CALC: 42.8 % (ref 37–47)
HDLC SERPL-MCNC: 54 MG/DL
HEMOGLOBIN: 13.7 GM/DL (ref 12–16)
IMMATURE GRANS (ABS): 0.01 THOU/MM3 (ref 0–0.07)
IMMATURE GRANULOCYTES: 0.2 %
LDL CHOLESTEROL CALCULATED: 227 MG/DL
LYMPHOCYTES # BLD: 26 %
LYMPHOCYTES ABSOLUTE: 1.3 THOU/MM3 (ref 1–4.8)
MCH RBC QN AUTO: 31.6 PG (ref 26–33)
MCHC RBC AUTO-ENTMCNC: 32 GM/DL (ref 32.2–35.5)
MCV RBC AUTO: 98.8 FL (ref 81–99)
MONOCYTES # BLD: 8.6 %
MONOCYTES ABSOLUTE: 0.4 THOU/MM3 (ref 0.4–1.3)
NUCLEATED RED BLOOD CELLS: 0 /100 WBC
PLATELET # BLD: 243 THOU/MM3 (ref 130–400)
PMV BLD AUTO: 10.1 FL (ref 9.4–12.4)
POTASSIUM SERPL-SCNC: 4.7 MEQ/L (ref 3.5–5.2)
RBC # BLD: 4.33 MILL/MM3 (ref 4.2–5.4)
SEG NEUTROPHILS: 62.6 %
SEGMENTED NEUTROPHILS ABSOLUTE COUNT: 3.2 THOU/MM3 (ref 1.8–7.7)
SODIUM BLD-SCNC: 139 MEQ/L (ref 135–145)
TOTAL PROTEIN: 6.6 G/DL (ref 6.1–8)
TRIGL SERPL-MCNC: 214 MG/DL (ref 0–199)
WBC # BLD: 5.1 THOU/MM3 (ref 4.8–10.8)

## 2022-02-02 PROCEDURE — G0402 INITIAL PREVENTIVE EXAM: HCPCS | Performed by: FAMILY MEDICINE

## 2022-02-02 PROCEDURE — 4040F PNEUMOC VAC/ADMIN/RCVD: CPT | Performed by: FAMILY MEDICINE

## 2022-02-02 PROCEDURE — G0009 ADMIN PNEUMOCOCCAL VACCINE: HCPCS | Performed by: FAMILY MEDICINE

## 2022-02-02 PROCEDURE — 3017F COLORECTAL CA SCREEN DOC REV: CPT | Performed by: FAMILY MEDICINE

## 2022-02-02 PROCEDURE — 90732 PPSV23 VACC 2 YRS+ SUBQ/IM: CPT | Performed by: FAMILY MEDICINE

## 2022-02-02 PROCEDURE — 1123F ACP DISCUSS/DSCN MKR DOCD: CPT | Performed by: FAMILY MEDICINE

## 2022-02-02 RX ORDER — SOLIFENACIN SUCCINATE 5 MG/1
5 TABLET, FILM COATED ORAL DAILY
Qty: 90 TABLET | Refills: 3 | Status: SHIPPED | OUTPATIENT
Start: 2022-02-02

## 2022-02-02 RX ORDER — LISINOPRIL 10 MG/1
TABLET ORAL
Qty: 90 TABLET | Refills: 3 | Status: SHIPPED | OUTPATIENT
Start: 2022-02-02

## 2022-02-02 SDOH — ECONOMIC STABILITY: FOOD INSECURITY: WITHIN THE PAST 12 MONTHS, YOU WORRIED THAT YOUR FOOD WOULD RUN OUT BEFORE YOU GOT MONEY TO BUY MORE.: NEVER TRUE

## 2022-02-02 SDOH — ECONOMIC STABILITY: FOOD INSECURITY: WITHIN THE PAST 12 MONTHS, THE FOOD YOU BOUGHT JUST DIDN'T LAST AND YOU DIDN'T HAVE MONEY TO GET MORE.: NEVER TRUE

## 2022-02-02 ASSESSMENT — LIFESTYLE VARIABLES
HOW OFTEN DURING THE LAST YEAR HAVE YOU BEEN UNABLE TO REMEMBER WHAT HAPPENED THE NIGHT BEFORE BECAUSE YOU HAD BEEN DRINKING: 0
HOW OFTEN DURING THE LAST YEAR HAVE YOU HAD A FEELING OF GUILT OR REMORSE AFTER DRINKING: 0
HOW OFTEN DO YOU HAVE A DRINK CONTAINING ALCOHOL: 1
HAS A RELATIVE, FRIEND, DOCTOR, OR ANOTHER HEALTH PROFESSIONAL EXPRESSED CONCERN ABOUT YOUR DRINKING OR SUGGESTED YOU CUT DOWN: 0
HOW OFTEN DO YOU HAVE SIX OR MORE DRINKS ON ONE OCCASION: 0
HOW OFTEN DURING THE LAST YEAR HAVE YOU FOUND THAT YOU WERE NOT ABLE TO STOP DRINKING ONCE YOU HAD STARTED: 0
HOW MANY STANDARD DRINKS CONTAINING ALCOHOL DO YOU HAVE ON A TYPICAL DAY: 0
AUDIT TOTAL SCORE: 1
HOW OFTEN DURING THE LAST YEAR HAVE YOU FAILED TO DO WHAT WAS NORMALLY EXPECTED FROM YOU BECAUSE OF DRINKING: 0
HOW OFTEN DURING THE LAST YEAR HAVE YOU NEEDED AN ALCOHOLIC DRINK FIRST THING IN THE MORNING TO GET YOURSELF GOING AFTER A NIGHT OF HEAVY DRINKING: 0
HAVE YOU OR SOMEONE ELSE BEEN INJURED AS A RESULT OF YOUR DRINKING: 0
AUDIT-C TOTAL SCORE: 1

## 2022-02-02 ASSESSMENT — PATIENT HEALTH QUESTIONNAIRE - PHQ9
SUM OF ALL RESPONSES TO PHQ QUESTIONS 1-9: 0
2. FEELING DOWN, DEPRESSED OR HOPELESS: 0
SUM OF ALL RESPONSES TO PHQ9 QUESTIONS 1 & 2: 0
SUM OF ALL RESPONSES TO PHQ QUESTIONS 1-9: 0
SUM OF ALL RESPONSES TO PHQ QUESTIONS 1-9: 0
1. LITTLE INTEREST OR PLEASURE IN DOING THINGS: 0
SUM OF ALL RESPONSES TO PHQ QUESTIONS 1-9: 0

## 2022-02-02 ASSESSMENT — SOCIAL DETERMINANTS OF HEALTH (SDOH): HOW HARD IS IT FOR YOU TO PAY FOR THE VERY BASICS LIKE FOOD, HOUSING, MEDICAL CARE, AND HEATING?: NOT HARD AT ALL

## 2022-02-02 NOTE — PROGRESS NOTES
Medicare Annual Wellness Visit  Name: Otilia Portillo [id number] Date: 2022   MRN: [id number] Sex: Female   Age: 74 y.o. Ethnicity: Non- / Non    : 1955 Race: White (non-)      Otilia Portillo is here for Medicare AWV (refills)    Screenings for behavioral, psychosocial and functional/safety risks, and cognitive dysfunction are all negative except as indicated below. These results, as well as other patient data from the [address] form, are documented in Flowsheets linked to this Encounter. No Known Allergies      Prior to Visit Medications    Medication Sig Taking? Authorizing Provider   lisinopril (PRINIVIL;ZESTRIL) 10 MG tablet TAKE 1 TABLET BY MOUTH ONE TIME A DAY Yes Gonsalo Melissa MD   solifenacin (VESICARE) 5 MG tablet Take 1 tablet by mouth daily Yes Gonsalo Melissa MD   ibuprofen (ADVIL;MOTRIN) 800 MG tablet Take 1 tablet by mouth every 8 hours as needed for Pain for 30 doses. Yes Ba Otero PA-C   sertraline (ZOLOFT) 100 MG tablet Take 100 mg by mouth daily. Yes Historical Provider, MD   ValACYclovir HCl (VALTREX PO) Take  by mouth as needed.  TAKES AS DIRECTED PRN  Yes Historical Provider, MD         Past Medical History:   Diagnosis Date    BRCA1 negative     negative per pt    BRCA2 negative     neg per pt    Depression     Glucose intolerance (impaired glucose tolerance)     Heart palpitations     HX OF:    History of cold sores     History of fatigue     Hyperlipidemia     Obesity, mild     Vitamin D deficiency        Past Surgical History:   Procedure Laterality Date    APPENDECTOMY      COLONOSCOPY      WI BIOPSY OF BREAST, INCISIONAL Right     WI COLON CA SCRN NOT HI RSK IND N/A 2018    COLONOSCOPY performed by Joao Luevano MD at [address] [place] [address] ECHOCARDIOGRAM  2011    EF 55-65%  NORMAL         Family History   Problem Relation Age of Onset    Heart Disease Mother    Jerrica Ovarian Cancer Mother 80    Breast Cancer Father 72       CareTeam (Including outside providers/suppliers regularly involved in providing care):   Patient Care Team:  Sherine Desai MD as PCP - Devonte Kidd MD as PCP - [facility]  Susan Jin MD as Obstetrician (Obstetrics & Gynecology)    [facility] Readings from Last 3 Encounters:   02/02/22 288 lb 12.8 oz (131 kg)   02/08/21 294 lb (133.4 kg)   10/01/19 279 lb (126.6 kg)     Vitals:    02/02/22 0958   BP: 126/76   Pulse: 75   Resp: 18   Temp: 97.5 °F (36.4 °C)   TempSrc: Infrared   SpO2: 94%   Weight: 288 lb 12.8 oz (131 kg)   Height: 5' 6.7\" (1.694 m)     Body mass index is 45.64 kg/m². Based upon direct observation of the patient, evaluation of cognition reveals recent and remote memory intact.     General Appearance: alert and oriented to person, place and time, well developed and well- nourished, in no acute distress  Skin: warm and dry, no rash or erythema  Head: normocephalic and atraumatic  Eyes: pupils equal, round, and reactive to light, extraocular eye movements intact, conjunctivae normal  ENT: tympanic membrane, external ear and ear canal normal bilaterally, nose without deformity, nasal mucosa and turbinates normal without polyps  Neck: supple and non-tender without mass, no thyromegaly or thyroid nodules, no cervical lymphadenopathy  Pulmonary/Chest: clear to auscultation bilaterally- no wheezes, rales or rhonchi, normal air movement, no respiratory distress  Cardiovascular: normal rate, regular rhythm, normal S1 and S2, no murmurs, rubs, clicks, or gallops, distal pulses intact, no carotid bruits  Abdomen: soft, non-tender, non-distended, normal bowel sounds, no masses or organomegaly  Extremities: no cyanosis, clubbing or edema  Musculoskeletal: normal range of motion, no joint swelling, deformity or tenderness  Neurologic: reflexes normal and symmetric, no cranial nerve deficit, gait, coordination and speech normal    Patient's complete Health Risk Assessment and screening values have been reviewed and are found in Flowsheets. The following problems were reviewed today and where indicated follow up appointments were made and/or referrals ordered. Positive Risk Factor Screenings with Interventions:              General Health and ACP:  General  In general, how would you say your health is?: Good  In the past 7 days, have you experienced any of the following?  New or Increased Pain, New or Increased Fatigue, Loneliness, Social Isolation, Stress or Anger?: None of These  Do you get the social and emotional support that you need?: Yes  Do you have a Living Will?: Yes  Advance Directives     Power of  Living Will ACP-Advance Directive ACP-Power of     Not on File Not on File Not on File Not on File      General Health Risk Interventions:  · No Living Will: ACP documents already completed- patient asked to provide copy to the office    Health Habits/Nutrition:  Health Habits/Nutrition  Do you exercise for at least 20 minutes 2-3 times per week?: Yes  Have you lost any weight without trying in the past 3 months?: No  Do you eat only one meal per day?: No  Have you seen the dentist within the past year?: Yes  Body mass index: (!) 45.64  Health Habits/Nutrition Interventions:  · Nutritional issues:  educational materials for healthy, well-balanced diet provided         Personalized Preventive Plan   Current Health Maintenance Status  Immunization History   Administered Date(s) Administered    COVID-19, Cece Hogan, Primary or Immunocompromised, PF, 100mcg/0.5mL 12/28/2020, 01/25/2021, 11/09/2021    Influenza A ([id number]) Vaccine PF IM 11/06/2009, 11/06/2009, 12/03/2009, 12/03/2009    Influenza Virus Vaccine 10/17/2017, 10/05/2018, 10/18/2019, 10/16/2020, 11/03/2021    Tdap (Boostrix, Adacel) 12/08/2013        Health Maintenance   Topic Date Due    Hepatitis C screen  Never done    Depression Monitoring Never done    HIV screen  Never done    Shingles Vaccine (1 of 2) Never done    DEXA (modify frequency per FRAX score)  Never done    A1C test (Diabetic or Prediabetic)  05/11/2016    Annual Wellness Visit (AWV)  Never done    Pneumococcal 65+ years Vaccine (1 of 1 - PPSV23) Never done    Potassium monitoring  02/08/2022    Creatinine monitoring  02/08/2022    Cervical cancer screen  08/26/2022    Breast cancer screen  10/20/2022    Colon cancer screen colonoscopy  01/08/2023    DTaP/Tdap/Td vaccine (2 - Td or Tdap) 12/08/2023    Lipid screen  05/24/2024    Flu vaccine  Completed    COVID-19 Vaccine  Completed    Hepatitis A vaccine  Aged Out    Hepatitis B vaccine  Aged Out    Hib vaccine  Aged Out    Meningococcal (ACWY) vaccine  Aged Out     Recommendations for [employer] Due: see orders and patient instructions/AVS.  . Recommended screening schedule for the next 5-10 years is provided to the patient in written form: see Patient Instructions/AVS.    Sam Chavira was seen today for medicare awv. 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   Reviewed health maintenance

## 2022-02-02 NOTE — PATIENT INSTRUCTIONS
Personalized Preventive Plan for Ernesto Poll - 2/2/2022  Medicare offers a range of preventive health benefits. Some of the tests and screenings are paid in full while other may be subject to a deductible, co-insurance, and/or copay. Some of these benefits include a comprehensive review of your medical history including lifestyle, illnesses that may run in your family, and various assessments and screenings as appropriate. After reviewing your medical record and screening and assessments performed today your provider may have ordered immunizations, labs, imaging, and/or referrals for you. A list of these orders (if applicable) as well as your Preventive Care list are included within your After Visit Summary for your review. Other Preventive Recommendations:    · A preventive eye exam performed by an eye specialist is recommended every 1-2 years to screen for glaucoma; cataracts, macular degeneration, and other eye disorders. · A preventive dental visit is recommended every 6 months. · Try to get at least 150 minutes of exercise per week or 10,000 steps per day on a pedometer . · Order or download the FREE \"Exercise & Physical Activity: Your Everyday Guide\" from [employer]. Call 0-249.400.8589 or search [employer] online. · You need 5835-8260 mg of calcium and 0273-3225 IU of vitamin D per day. It is possible to meet your calcium requirement with diet alone, but a vitamin D supplement is usually necessary to meet this goal.  · When exposed to the sun, use a sunscreen that protects against both UVA and UVB radiation with an SPF of 30 or greater. Reapply every 2 to 3 hours or after sweating, drying off with a towel, or swimming. · Always wear a seat belt when traveling in a car. Always wear a helmet when riding a bicycle or motorcycle. Patient Education        Well Visit, Over 72: Care Instructions  Overview     Well visits can help you stay healthy.  Your doctor has checked your overall health and may have suggested ways to take good care of yourself. Your doctor also may have recommended tests. At home, you can help prevent illness with healthy eating, regular exercise, and other steps. Follow-up care is a key part of your treatment and safety. Be sure to make and go to all appointments, and call your doctor if you are having problems. It's also a good idea to know your test results and keep a list of the medicines you take. How can you care for yourself at home? Get screening tests that you and your doctor decide on. Screening helps find diseases before any symptoms appear. Eat healthy foods. Choose fruits, vegetables, whole grains, protein, and low-fat dairy foods. Limit fat, especially saturated fat. Reduce salt in your diet. Limit alcohol. If you are a man, have no more than 2 drinks a day or 14 drinks a week. If you are a woman, have no more than 1 drink a day or 7 drinks a week. Since alcohol affects older adults differently, you may want to limit alcohol even more. Or you may not want to drink at all. Get at least 30 minutes of exercise on most days of the week. Walking is a good choice. You also may want to do other activities, such as running, swimming, cycling, or playing tennis or team sports. Reach and stay at a healthy weight. This will lower your risk for many problems, such as obesity, diabetes, heart disease, and high blood pressure. Do not smoke. Smoking can make health problems worse. If you need help quitting, talk to your doctor about stop-smoking programs and medicines. These can increase your chances of quitting for good. Care for your mental health. It is easy to get weighed down by worry and stress. Learn strategies to manage stress, like deep breathing and mindfulness, and stay connected with your family and community. If you find you often feel sad or hopeless, talk with your doctor. Treatment can help.   Talk to your doctor about whether you have any risk factors for sexually transmitted infections (STIs). You can help prevent STIs if you wait to have sex with a new partner (or partners) until you've each been tested for STIs. It also helps if you use condoms (male or female condoms) and if you limit your sex partners to one person who only has sex with you. Vaccines are available for some STIs. If you think you may have a problem with alcohol or drug use, talk to your doctor. This includes prescription medicines (such as amphetamines and opioids) and illegal drugs (such as cocaine and methamphetamine). Your doctor can help you figure out what type of treatment is best for you. Protect your skin from too much sun. When you're outdoors from 10 a.m. to 4 p.m., stay in the shade or cover up with clothing and a hat with a wide brim. Wear sunglasses that block UV rays. Even when it's cloudy, put broad-spectrum sunscreen (SPF 30 or higher) on any exposed skin. See a dentist one or two times a year for checkups and to have your teeth cleaned. Wear a seat belt in the car. When should you call for help? Watch closely for changes in your health, and be sure to contact your doctor if you have any problems or symptoms that concern you. Where can you learn more? Go to https://E-Blinkaraceliseb.[employer]. org and sign in to your [employer] account. Enter T450 in the [facility] box to learn more about \"Well Visit, Over 65: Care Instructions. \"     If you do not have an account, please click on the \"Sign Up Now\" link. Current as of: October 6, 2021               Content Version: 13.1  © 1247-2987 Healthwise, Incorporated. Care instructions adapted under license by [facility] ([facility]). If you have questions about a medical condition or this instruction, always ask your healthcare professional. [address] any warranty or liability for your use of this information.

## 2022-02-02 NOTE — PROGRESS NOTES
Blood work drawn today in the office, venous puncture, right arm, pt tolerated well. Madyson Nelson  1955    1. Are you sick today? no  2. Do you have allergies to medications, food, a vaccine component, or latex? no  3. Have you ever had a serious reaction after receiving a vaccination? no  4. Do you have a long-term health problem with heart, lung, kidney, or metabolic disease (e.g. diabetes), asthma, a blood disorder, no spleen, complement component deficiency, a cochlear implant, or a spinal fluid leak? Are you on long-term aspirin therapy? no  5. Do you have cancer, leukemia, HIV/AIDS, or any other immune system problem? no  6. Do you have a parent, brother, or sister with an immune system problem? no  7. In the past 3 months, have you taken medications that affect your immune system, such as prednisone, other steroids, or anticancer drugs; drugs for the treatment of rheumatoid arthritis, Crohn's disease, or psoriasis; or have you had radiation treatment? no  8. Have you had a seizure or a brain or other nervous system problem? no  9. During the past year, have you received a transfusion of blood or blood products, or been given immune (gamma) globulin or an antiviral drug? no  10. For women: Are you pregnant or is there a chance you could become pregnant during the next month? no  11. Have you received any vaccinations in the past 4 weeks? no    Form Completed by: Patient on 2/2/2022 at 10:43 AM EST  Form Reviewed by: Andrey Abrams on 2/2/2022 at 10:43 AM EST    Did you bring your immunization card with you?  No     Immunizations Administered     Name Date Dose Route    Pneumococcal Polysaccharide ([id number]) 2/2/2022 0.5 mL Intramuscular    Site: Deltoid- Right    Lot: [id number]    NDC: 4087-6044-40

## 2022-02-07 ENCOUNTER — TELEPHONE (OUTPATIENT)
Dept: FAMILY MEDICINE CLINIC | Age: 67
End: 2022-02-07

## 2022-02-07 DIAGNOSIS — E78.00 HYPERCHOLESTEREMIA: Primary | ICD-10-CM

## 2022-02-07 RX ORDER — ROSUVASTATIN CALCIUM 10 MG/1
10 TABLET, COATED ORAL NIGHTLY
Qty: 90 TABLET | Refills: 3 | Status: SHIPPED | OUTPATIENT
Start: 2022-02-07

## 2022-02-07 NOTE — TELEPHONE ENCOUNTER
Spoke to pt, she is interested in [facility]  Requests rx to [facility]  Lab order to recheck total chol, ldl and ast mailed to pt

## 2022-02-07 NOTE — TELEPHONE ENCOUNTER
----- Message from Angie Kimble MD sent at 2/3/2022  9:06 AM EST -----  CMP is normal.  Cholesterol higher at 324 with increased CRR. Is she interested in chol lowering med? CBC is good.

## 2022-02-23 DIAGNOSIS — I10 BENIGN ESSENTIAL HTN: ICD-10-CM

## 2022-02-23 DIAGNOSIS — N32.81 OAB (OVERACTIVE BLADDER): ICD-10-CM

## 2022-02-23 RX ORDER — LISINOPRIL 10 MG/1
TABLET ORAL
Qty: 90 TABLET | Refills: 3 | OUTPATIENT
Start: 2022-02-23

## 2022-02-23 RX ORDER — SOLIFENACIN SUCCINATE 5 MG/1
5 TABLET, FILM COATED ORAL DAILY
Qty: 90 TABLET | Refills: 3 | OUTPATIENT
Start: 2022-02-23

## 2022-02-23 NOTE — TELEPHONE ENCOUNTER
Tabatha Nelson needs refill of   Requested Prescriptions     Pending Prescriptions Disp Refills    lisinopril (PRINIVIL;ZESTRIL) 10 MG tablet [Pharmacy Med Name: LISINOPRIL 10MG TABS] 90 tablet 3     Sig: TAKE 1 TABLET BY MOUTH ONE TIME DAILY    solifenacin (VESICARE) 5 MG tablet [Pharmacy Med Name: SOLIFENACIN SUCCINATE 5MG TABS] 90 tablet 3     Sig: TAKE 1 TABLET BY MOUTH DAILY       Last Filled on:  2/2/2022 90 with 3 refills, [facility]    Last Visit Date:  2/2/2022    Next Visit Date:    Visit date not found    Left a message for patient to call the office to see if patient wanted these at [place] [phone]?

## 2022-02-23 NOTE — TELEPHONE ENCOUNTER
Called patient, she states she has now retired and has to get her medications at [facility].

## 2022-05-18 ENCOUNTER — TELEPHONE (OUTPATIENT)
Dept: FAMILY MEDICINE CLINIC | Age: 67
End: 2022-05-18

## 2022-05-18 ENCOUNTER — HOSPITAL ENCOUNTER (OUTPATIENT)
Age: 67
Discharge: HOME | End: 2022-05-18
Payer: MEDICARE

## 2022-05-18 DIAGNOSIS — E78.00 HYPERCHOLESTEREMIA: ICD-10-CM

## 2022-05-18 LAB
AST SERPL-CCNC: 17 U/L (ref 5–40)
CHOLESTEROL, TOTAL: 187 MG/DL (ref 100–199)
LDL CHOLESTEROL DIRECT: 97.87 MG/DL

## 2022-05-18 PROCEDURE — 84450 TRANSFERASE (AST) (SGOT): CPT

## 2022-05-18 PROCEDURE — 82465 ASSAY BLD/SERUM CHOLESTEROL: CPT

## 2022-05-18 PROCEDURE — 83721 ASSAY OF BLOOD LIPOPROTEIN: CPT

## 2022-05-18 NOTE — TELEPHONE ENCOUNTER
----- Message from Reinaldo Meza MD sent at 5/18/2022  3:53 PM EDT -----  Cholesterol much better 324 to 187. Normal liver.   Continue crestor

## 2022-07-05 ENCOUNTER — TELEPHONE (OUTPATIENT)
Dept: FAMILY MEDICINE CLINIC | Age: 67
End: 2022-07-05

## 2022-07-05 DIAGNOSIS — T75.3XXA MOTION SICKNESS, INITIAL ENCOUNTER: Primary | ICD-10-CM

## 2022-07-05 RX ORDER — SCOLOPAMINE TRANSDERMAL SYSTEM 1 MG/1
1 PATCH, EXTENDED RELEASE TRANSDERMAL
Qty: 4 PATCH | Refills: 0 | Status: SHIPPED | OUTPATIENT
Start: 2022-07-05 | End: 2022-10-12

## 2022-07-05 NOTE — TELEPHONE ENCOUNTER
Patient is going on a cruise and leaving this coming Saturday, patient called in to ask if you would prescribe Scolpamine patches for motion sickness to [employer] aid. Please advise.

## 2022-10-10 ENCOUNTER — TELEPHONE (OUTPATIENT)
Dept: CARDIOLOGY CLINIC | Age: 67
End: 2022-10-10

## 2022-10-10 NOTE — TELEPHONE ENCOUNTER
Received a call from patient to schedule an appt. While vacationing in [place] she had a syncopal episode. She will be back in town around the 10/16. Appt scheduled on 10/19/22 with Dr. Xiomy Goldberg. I called [facility] at [phone] and faxed a request for records to [phone].

## 2022-10-12 ENCOUNTER — OFFICE VISIT (OUTPATIENT)
Dept: CARDIOLOGY CLINIC | Age: 67
End: 2022-10-12
Payer: MEDICARE

## 2022-10-12 ENCOUNTER — HOSPITAL ENCOUNTER (OUTPATIENT)
Dept: CT IMAGING | Age: 67
Discharge: HOME | End: 2022-10-12
Payer: MEDICARE

## 2022-10-12 VITALS
HEIGHT: 67 IN | HEART RATE: 75 BPM | BODY MASS INDEX: 44.42 KG/M2 | SYSTOLIC BLOOD PRESSURE: 111 MMHG | DIASTOLIC BLOOD PRESSURE: 67 MMHG | WEIGHT: 283 LBS

## 2022-10-12 DIAGNOSIS — R06.02 SOB (SHORTNESS OF BREATH): ICD-10-CM

## 2022-10-12 DIAGNOSIS — E78.00 PURE HYPERCHOLESTEROLEMIA: ICD-10-CM

## 2022-10-12 DIAGNOSIS — I10 PRIMARY HYPERTENSION: Primary | ICD-10-CM

## 2022-10-12 DIAGNOSIS — R55 SYNCOPE, UNSPECIFIED SYNCOPE TYPE: ICD-10-CM

## 2022-10-12 PROCEDURE — 99204 OFFICE O/P NEW MOD 45 MIN: CPT | Performed by: NUCLEAR MEDICINE

## 2022-10-12 PROCEDURE — 1090F PRES/ABSN URINE INCON ASSESS: CPT | Performed by: NUCLEAR MEDICINE

## 2022-10-12 PROCEDURE — G8427 DOCREV CUR MEDS BY ELIG CLIN: HCPCS | Performed by: NUCLEAR MEDICINE

## 2022-10-12 PROCEDURE — 3017F COLORECTAL CA SCREEN DOC REV: CPT | Performed by: NUCLEAR MEDICINE

## 2022-10-12 PROCEDURE — 1036F TOBACCO NON-USER: CPT | Performed by: NUCLEAR MEDICINE

## 2022-10-12 PROCEDURE — G8400 PT W/DXA NO RESULTS DOC: HCPCS | Performed by: NUCLEAR MEDICINE

## 2022-10-12 PROCEDURE — 6360000004 HC RX CONTRAST MEDICATION: Performed by: SURGERY

## 2022-10-12 PROCEDURE — G8417 CALC BMI ABV UP PARAM F/U: HCPCS | Performed by: NUCLEAR MEDICINE

## 2022-10-12 PROCEDURE — 1123F ACP DISCUSS/DSCN MKR DOCD: CPT | Performed by: NUCLEAR MEDICINE

## 2022-10-12 PROCEDURE — G8484 FLU IMMUNIZE NO ADMIN: HCPCS | Performed by: NUCLEAR MEDICINE

## 2022-10-12 PROCEDURE — 71275 CT ANGIOGRAPHY CHEST: CPT

## 2022-10-12 RX ORDER — NITROFURANTOIN 25; 75 MG/1; MG/1
100 CAPSULE ORAL 2 TIMES DAILY
COMMUNITY

## 2022-10-12 RX ADMIN — IOPAMIDOL 80 ML: 755 INJECTION, SOLUTION INTRAVENOUS at 15:11

## 2022-10-12 ASSESSMENT — ENCOUNTER SYMPTOMS
ANAL BLEEDING: 0
CHEST TIGHTNESS: 0
RECTAL PAIN: 0
ABDOMINAL PAIN: 0
DIARRHEA: 0
COLOR CHANGE: 0
CONSTIPATION: 0
SHORTNESS OF BREATH: 1
ABDOMINAL DISTENTION: 0
NAUSEA: 0
VOMITING: 0
PHOTOPHOBIA: 0
BACK PAIN: 0
BLOOD IN STOOL: 0

## 2022-10-12 NOTE — PROGRESS NOTES
[id number] [facility].  [address] [place] [id number]  Dept: [phone]  Dept Fax: [phone]  Loc: [phone]    Visit Date: 10/12/2022    Bel De La Vega is a 74 y.o. female who presents todayfor:  Chief Complaint   Patient presents with    New Patient    Hypertension    Hyperlipidemia   Here for the first time  Was in [place]  Had syncope episode  Started with not feeling well   Not herself  Then in AM on breakfast table   Got hot   Then syncope after dyspnea   Here for evaluation   No known CAD before  Does have HTN and hyperlipidemia  No previous episodes   No know seizures  No smoking   Family history of CAD       HPI:  Hypertension  Associated symptoms include shortness of breath. Pertinent negatives include no chest pain, neck pain or palpitations. Hyperlipidemia  Associated symptoms include shortness of breath. Pertinent negatives include no chest pain or myalgias.    Past Medical History:   Diagnosis Date    BRCA1 negative 2019    negative per pt    BRCA2 negative 2019    neg per pt    Depression     Glucose intolerance (impaired glucose tolerance)     Heart palpitations     HX OF:    History of cold sores     History of fatigue     Hyperlipidemia     Obesity, mild     Vitamin D deficiency       Past Surgical History:   Procedure Laterality Date    APPENDECTOMY      COLONOSCOPY      PA BIOPSY OF BREAST, INCISIONAL Right 1978    PA COLON CA SCRN NOT HI RSK IND N/A 1/8/2018    COLONOSCOPY performed by Jimmy Alexandra MD at [address] ECHOCARDIOGRAM  04/29/2011    EF 55-65%  NORMAL     Family History   Problem Relation Age of Onset    Heart Disease Mother     Ovarian Cancer Mother 80    Breast Cancer Father 72     Social History     Tobacco Use    Smoking status: Never    Smokeless tobacco: Never   Substance Use Topics    Alcohol use: Yes     Comment: SOCIAL      Current Outpatient Medications Medication Sig Dispense Refill    nitrofurantoin, macrocrystal-monohydrate, (MACROBID) 100 MG capsule Take 100 mg by mouth 2 times daily      rosuvastatin (CRESTOR) 10 MG tablet Take 1 tablet by mouth nightly 90 tablet 3    lisinopril (PRINIVIL;ZESTRIL) 10 MG tablet TAKE 1 TABLET BY MOUTH ONE TIME A DAY 90 tablet 3    solifenacin (VESICARE) 5 MG tablet Take 1 tablet by mouth daily 90 tablet 3    sertraline (ZOLOFT) 100 MG tablet Take 100 mg by mouth daily. ValACYclovir HCl (VALTREX PO) Take  by mouth as needed. TAKES AS DIRECTED PRN        No current facility-administered medications for this visit. No Known Allergies  Health Maintenance   Topic Date Due    HIV screen  Never done    Hepatitis C screen  Never done    Shingles vaccine (1 of 2) Never done    DEXA (modify frequency per FRAX score)  Never done    A1C test (Diabetic or Prediabetic)  05/11/2016    COVID-19 Vaccine (4 - Booster for Moderna series) 03/09/2022    Flu vaccine (1) 08/01/2022    Cervical cancer screen  08/26/2022    Breast cancer screen  10/20/2022    Colorectal Cancer Screen  01/08/2023    Depression Monitoring  02/02/2023    Annual Wellness Visit (AWV)  02/03/2023    Lipids  05/18/2023    DTaP/Tdap/Td vaccine (2 - Td or Tdap) 12/08/2023    Pneumococcal 65+ years Vaccine  Completed    Hepatitis A vaccine  Aged Out    Hib vaccine  Aged Out    Meningococcal (ACWY) vaccine  Aged Out       Subjective:  Review of Systems   Constitutional:  Positive for fatigue. Negative for diaphoresis. HENT:  Negative for ear pain and mouth sores. Eyes:  Negative for photophobia. Respiratory:  Positive for shortness of breath. Negative for chest tightness. Cardiovascular:  Negative for chest pain, palpitations and leg swelling. Gastrointestinal:  Negative for abdominal distention, abdominal pain, anal bleeding, blood in stool, constipation, diarrhea, nausea, rectal pain and vomiting. Endocrine: Negative for polyphagia.    Genitourinary: Negative for dysuria, frequency and urgency. Musculoskeletal:  Negative for arthralgias, back pain, gait problem, joint swelling, myalgias, neck pain and neck stiffness. Skin:  Negative for color change, pallor, rash and wound. Allergic/Immunologic: Negative for food allergies. Neurological:  Positive for syncope. Negative for dizziness and numbness. Psychiatric/Behavioral:  Negative for behavioral problems, confusion, decreased concentration and dysphoric mood. Objective:  Physical Exam  HENT:      Head: Normocephalic. Right Ear: Tympanic membrane normal.      Nose: Nose normal.      Mouth/Throat:      Mouth: Mucous membranes are moist.   Eyes:      Pupils: Pupils are equal, round, and reactive to light. Cardiovascular:      Rate and Rhythm: Normal rate and regular rhythm. Heart sounds: Murmur heard. No gallop. Pulmonary:      Effort: No respiratory distress. Breath sounds: No stridor. No wheezing, rhonchi or rales. Chest:      Chest wall: No tenderness. Abdominal:      General: There is no distension. Palpations: There is no mass. Tenderness: There is no abdominal tenderness. There is no right CVA tenderness, left CVA tenderness, guarding or rebound. Hernia: No hernia is present. Musculoskeletal:         General: No swelling, tenderness, deformity or signs of injury. Cervical back: Normal range of motion. Right lower leg: No edema. Left lower leg: No edema. Skin:     Coloration: Skin is not jaundiced or pale. Findings: No bruising, erythema, lesion or rash. Neurological:      Mental Status: She is alert and oriented to person, place, and time. Cranial Nerves: No cranial nerve deficit. Sensory: No sensory deficit. Motor: No weakness.       Coordination: Coordination normal.      Gait: Gait normal.      Deep Tendon Reflexes: Reflexes normal.   Psychiatric:         Mood and Affect: Mood normal.         Thought Content: Thought content normal.     /67   Pulse 75   Ht 5' 7\" (1.702 m)   Wt 283 lb (128.4 kg)   BMI 44.32 kg/m²     Assessment:      Diagnosis Orders   1. Primary hypertension        2. Pure hypercholesterolemia        As above  Possible drop in the BP  Risk for CAD  Here for evaluation     Plan:  No follow-ups on file. Discussed  Consider a tilt table test  ?? Stress test and echo   Monitor the BP  Consider a holter   Continue risk factor modification and medical management  Thank you for allowing me to participate in the care of your patient. Please don't hesitate to contact me regarding any further issues related to the patient care    Orders Placed:  No orders of the defined types were placed in this encounter. Medications Prescribed:  No orders of the defined types were placed in this encounter. Discussed use, benefit, and side effects of prescribed medications. All patient questions answered. Pt voicedunderstanding. Instructed to continue current medications, diet and exercise. Continue risk factor modification and medical management. Patient agreed with treatment plan. Follow up as directed.     Electronically signedby Khang Hugo MD on 10/12/2022 at 1:51 PM

## 2022-10-12 NOTE — TELEPHONE ENCOUNTER
Patient is calling stating that she ended up coming home early d/t symptoms shes having. She is not able to get in to see her PCP and wants to know if there appt with Dr. Davies Seat could be moved up to today?   Please advise

## 2022-10-13 ENCOUNTER — TELEPHONE (OUTPATIENT)
Dept: CARDIOLOGY CLINIC | Age: 67
End: 2022-10-13

## 2022-10-13 NOTE — TELEPHONE ENCOUNTER
CTA Chest done. Impression    No pulmonary emboli. Hazy opacities at the posterior aspects of both upper and lower lobes may represent acute pneumonitis or edema. **This report has been created using voice recognition software. It may contain minor errors which are inherent in voice recognition technology. **       Final report electronically signed by Dr. Nas Mccormick on 10/12/2022 3:44 PM

## 2022-10-13 NOTE — TELEPHONE ENCOUNTER
Pt called and would like the results of her testing she had done 10/12/22 as soon as they are released.

## 2022-10-21 ENCOUNTER — HOSPITAL ENCOUNTER (OUTPATIENT)
Dept: NON INVASIVE DIAGNOSTICS | Age: 67
Discharge: HOME | End: 2022-10-21
Payer: MEDICARE

## 2022-10-21 DIAGNOSIS — R55 SYNCOPE, UNSPECIFIED SYNCOPE TYPE: ICD-10-CM

## 2022-10-21 DIAGNOSIS — I10 PRIMARY HYPERTENSION: ICD-10-CM

## 2022-10-21 DIAGNOSIS — R06.02 SOB (SHORTNESS OF BREATH): ICD-10-CM

## 2022-10-21 DIAGNOSIS — E78.00 PURE HYPERCHOLESTEROLEMIA: ICD-10-CM

## 2022-10-21 PROCEDURE — 6360000002 HC RX W HCPCS

## 2022-10-21 PROCEDURE — 78452 HT MUSCLE IMAGE SPECT MULT: CPT

## 2022-10-21 PROCEDURE — A9500 TC99M SESTAMIBI: HCPCS | Performed by: NUCLEAR MEDICINE

## 2022-10-21 PROCEDURE — 3430000000 HC RX DIAGNOSTIC RADIOPHARMACEUTICAL: Performed by: NUCLEAR MEDICINE

## 2022-10-21 PROCEDURE — 93306 TTE W/DOPPLER COMPLETE: CPT

## 2022-10-21 PROCEDURE — 93017 CV STRESS TEST TRACING ONLY: CPT | Performed by: NUCLEAR MEDICINE

## 2022-10-21 RX ADMIN — Medication 34 MILLICURIE: at 09:00

## 2022-10-21 RX ADMIN — Medication 10.5 MILLICURIE: at 08:10

## 2022-10-24 ENCOUNTER — TELEPHONE (OUTPATIENT)
Dept: FAMILY MEDICINE CLINIC | Age: 67
End: 2022-10-24

## 2022-10-24 NOTE — TELEPHONE ENCOUNTER
Patient notified and verbalized understanding. She will continue to monitor BP's and call if they trend up or if she has any issues or problems.

## 2022-10-24 NOTE — TELEPHONE ENCOUNTER
Ok to hold the lisinopril. Having cardiology work up with [facility].

## 2022-10-24 NOTE — TELEPHONE ENCOUNTER
Beginning of Oct pt was on vacation and went to ER because she had passed out. She was placed on a heart monitor and had to mail it back. Those results will be delivered to you and she asked that we forward them to Dr Jorge L Haney as she has since seen him. She had a stress test, echo and CTA. Has only heard back on the CTA. She usually takes her lisinopril at night and wakes up groggy and lightheaded. Never thought it could be the lisinopril. She started to check her BP in the am was 95/62, 110/72. Sat am she really felt bad so she did not take the lisinopril sat or Sunday joya and Sunday am her BP was 130/78 and this am it was 129/79. She has felt better off of the lisinopril. Her question is should she stay off of it, try taking in the am, or await cardiology. If wait for cardiology should she take or hold.  She is also willing to make appt with you to discuss further if you feel that would be best.

## 2022-11-14 ENCOUNTER — NURSE ONLY (OUTPATIENT)
Dept: LAB | Age: 67
End: 2022-11-14

## 2022-11-22 LAB — CYTOLOGY THIN PREP PAP: NORMAL

## 2022-12-07 ENCOUNTER — HOSPITAL ENCOUNTER (OUTPATIENT)
Dept: WOMENS IMAGING | Age: 67
Discharge: HOME | End: 2022-12-07
Payer: MEDICARE

## 2022-12-07 DIAGNOSIS — Z12.31 VISIT FOR SCREENING MAMMOGRAM: ICD-10-CM

## 2022-12-07 PROCEDURE — 77063 BREAST TOMOSYNTHESIS BI: CPT

## 2022-12-16 ENCOUNTER — HOSPITAL ENCOUNTER (OUTPATIENT)
Dept: WOMENS IMAGING | Age: 67
Discharge: HOME | End: 2022-12-16
Payer: MEDICARE

## 2022-12-16 DIAGNOSIS — R92.2 BREAST DENSITY: ICD-10-CM

## 2022-12-16 PROCEDURE — 77065 DX MAMMO INCL CAD UNI: CPT

## 2022-12-16 PROCEDURE — 76642 ULTRASOUND BREAST LIMITED: CPT

## 2023-02-03 ENCOUNTER — OFFICE VISIT (OUTPATIENT)
Dept: FAMILY MEDICINE CLINIC | Age: 68
End: 2023-02-03

## 2023-02-03 VITALS
WEIGHT: 277.2 LBS | SYSTOLIC BLOOD PRESSURE: 116 MMHG | DIASTOLIC BLOOD PRESSURE: 78 MMHG | RESPIRATION RATE: 14 BRPM | BODY MASS INDEX: 43.51 KG/M2 | HEART RATE: 72 BPM | HEIGHT: 67 IN

## 2023-02-03 DIAGNOSIS — Z00.00 INITIAL MEDICARE ANNUAL WELLNESS VISIT: Primary | ICD-10-CM

## 2023-02-03 DIAGNOSIS — E78.00 HYPERCHOLESTEREMIA: ICD-10-CM

## 2023-02-03 DIAGNOSIS — D68.2 DYSFIBRINOGENEMIA (HCC): ICD-10-CM

## 2023-02-03 DIAGNOSIS — E66.01 OBESITY, CLASS III, BMI 40-49.9 (MORBID OBESITY) (HCC): ICD-10-CM

## 2023-02-03 LAB
ALBUMIN SERPL BCG-MCNC: 4.6 G/DL (ref 3.5–5.1)
ALP SERPL-CCNC: 77 U/L (ref 38–126)
ALT SERPL W/O P-5'-P-CCNC: 39 U/L (ref 11–66)
ANION GAP SERPL CALC-SCNC: 11 MEQ/L (ref 8–16)
AST SERPL-CCNC: 27 U/L (ref 5–40)
BASOPHILS ABSOLUTE: 0 THOU/MM3 (ref 0–0.1)
BASOPHILS NFR BLD AUTO: 0.8 %
BILIRUB SERPL-MCNC: 0.3 MG/DL (ref 0.3–1.2)
BUN SERPL-MCNC: 20 MG/DL (ref 7–22)
CALCIUM SERPL-MCNC: 9.3 MG/DL (ref 8.5–10.5)
CHLORIDE SERPL-SCNC: 107 MEQ/L (ref 98–111)
CHOLEST SERPL-MCNC: 187 MG/DL (ref 100–199)
CO2 SERPL-SCNC: 25 MEQ/L (ref 23–33)
CREAT SERPL-MCNC: 1 MG/DL (ref 0.4–1.2)
DEPRECATED RDW RBC AUTO: 43.8 FL (ref 35–45)
EOSINOPHIL NFR BLD AUTO: 1.9 %
EOSINOPHILS ABSOLUTE: 0.1 THOU/MM3 (ref 0–0.4)
ERYTHROCYTE [DISTWIDTH] IN BLOOD BY AUTOMATED COUNT: 12.4 % (ref 11.5–14.5)
GFR SERPL CREATININE-BSD FRML MDRD: > 60 ML/MIN/1.73M2
GLUCOSE SERPL-MCNC: 97 MG/DL (ref 70–108)
HCT VFR BLD AUTO: 44.3 % (ref 37–47)
HDLC SERPL-MCNC: 63 MG/DL
HGB BLD-MCNC: 14.1 GM/DL (ref 12–16)
IMM GRANULOCYTES # BLD AUTO: 0.01 THOU/MM3 (ref 0–0.07)
IMM GRANULOCYTES NFR BLD AUTO: 0.2 %
LDLC SERPL CALC-MCNC: 100 MG/DL
LYMPHOCYTES ABSOLUTE: 1.3 THOU/MM3 (ref 1–4.8)
LYMPHOCYTES NFR BLD AUTO: 25.2 %
MCH RBC QN AUTO: 30.5 PG (ref 26–33)
MCHC RBC AUTO-ENTMCNC: 31.8 GM/DL (ref 32.2–35.5)
MCV RBC AUTO: 95.9 FL (ref 81–99)
MONOCYTES ABSOLUTE: 0.4 THOU/MM3 (ref 0.4–1.3)
MONOCYTES NFR BLD AUTO: 7.4 %
NEUTROPHILS NFR BLD AUTO: 64.5 %
NRBC BLD AUTO-RTO: 0 /100 WBC
PLATELET # BLD AUTO: 246 THOU/MM3 (ref 130–400)
PMV BLD AUTO: 10.2 FL (ref 9.4–12.4)
POTASSIUM SERPL-SCNC: 4.5 MEQ/L (ref 3.5–5.2)
PROT SERPL-MCNC: 6.7 G/DL (ref 6.1–8)
RBC # BLD AUTO: 4.62 MILL/MM3 (ref 4.2–5.4)
SEGMENTED NEUTROPHILS ABSOLUTE COUNT: 3.4 THOU/MM3 (ref 1.8–7.7)
SODIUM SERPL-SCNC: 143 MEQ/L (ref 135–145)
TRIGL SERPL-MCNC: 118 MG/DL (ref 0–199)
WBC # BLD AUTO: 5.2 THOU/MM3 (ref 4.8–10.8)

## 2023-02-03 RX ORDER — ROSUVASTATIN CALCIUM 10 MG/1
10 TABLET, COATED ORAL NIGHTLY
Qty: 90 TABLET | Refills: 3 | Status: SHIPPED | OUTPATIENT
Start: 2023-02-03

## 2023-02-03 RX ORDER — MELOXICAM 7.5 MG/1
7.5 TABLET ORAL DAILY
COMMUNITY

## 2023-02-03 SDOH — ECONOMIC STABILITY: FOOD INSECURITY: WITHIN THE PAST 12 MONTHS, THE FOOD YOU BOUGHT JUST DIDN'T LAST AND YOU DIDN'T HAVE MONEY TO GET MORE.: NEVER TRUE

## 2023-02-03 SDOH — ECONOMIC STABILITY: HOUSING INSECURITY
IN THE LAST 12 MONTHS, WAS THERE A TIME WHEN YOU DID NOT HAVE A STEADY PLACE TO SLEEP OR SLEPT IN A SHELTER (INCLUDING NOW)?: NO

## 2023-02-03 SDOH — ECONOMIC STABILITY: INCOME INSECURITY: HOW HARD IS IT FOR YOU TO PAY FOR THE VERY BASICS LIKE FOOD, HOUSING, MEDICAL CARE, AND HEATING?: NOT HARD AT ALL

## 2023-02-03 SDOH — ECONOMIC STABILITY: FOOD INSECURITY: WITHIN THE PAST 12 MONTHS, YOU WORRIED THAT YOUR FOOD WOULD RUN OUT BEFORE YOU GOT MONEY TO BUY MORE.: NEVER TRUE

## 2023-02-03 ASSESSMENT — PATIENT HEALTH QUESTIONNAIRE - PHQ9
10. IF YOU CHECKED OFF ANY PROBLEMS, HOW DIFFICULT HAVE THESE PROBLEMS MADE IT FOR YOU TO DO YOUR WORK, TAKE CARE OF THINGS AT HOME, OR GET ALONG WITH OTHER PEOPLE: 0
2. FEELING DOWN, DEPRESSED OR HOPELESS: 0
SUM OF ALL RESPONSES TO PHQ QUESTIONS 1-9: 0
4. FEELING TIRED OR HAVING LITTLE ENERGY: 0
SUM OF ALL RESPONSES TO PHQ9 QUESTIONS 1 & 2: 0
1. LITTLE INTEREST OR PLEASURE IN DOING THINGS: 0
3. TROUBLE FALLING OR STAYING ASLEEP: 0
5. POOR APPETITE OR OVEREATING: 0
SUM OF ALL RESPONSES TO PHQ QUESTIONS 1-9: 0
9. THOUGHTS THAT YOU WOULD BE BETTER OFF DEAD, OR OF HURTING YOURSELF: 0
SUM OF ALL RESPONSES TO PHQ QUESTIONS 1-9: 0
SUM OF ALL RESPONSES TO PHQ QUESTIONS 1-9: 0
7. TROUBLE CONCENTRATING ON THINGS, SUCH AS READING THE NEWSPAPER OR WATCHING TELEVISION: 0
8. MOVING OR SPEAKING SO SLOWLY THAT OTHER PEOPLE COULD HAVE NOTICED. OR THE OPPOSITE, BEING SO FIGETY OR RESTLESS THAT YOU HAVE BEEN MOVING AROUND A LOT MORE THAN USUAL: 0
6. FEELING BAD ABOUT YOURSELF - OR THAT YOU ARE A FAILURE OR HAVE LET YOURSELF OR YOUR FAMILY DOWN: 0

## 2023-02-03 ASSESSMENT — LIFESTYLE VARIABLES
HOW MANY STANDARD DRINKS CONTAINING ALCOHOL DO YOU HAVE ON A TYPICAL DAY: 1 OR 2
HOW OFTEN DO YOU HAVE A DRINK CONTAINING ALCOHOL: MONTHLY OR LESS

## 2023-02-03 NOTE — PROGRESS NOTES
Medicare Annual Wellness Visit    Wilda Martinez is here for Medicare AWV (Medication refills, fasting BW)    Assessment & Plan   Initial Medicare annual wellness visit  Hypercholesteremia  -     rosuvastatin (CRESTOR) 10 MG tablet; Take 1 tablet by mouth nightly, Disp-90 tablet, R-3Normal  -     Lipid Panel; Future  -     CBC with Auto Differential; Future  -     Comprehensive Metabolic Panel; Future  Obesity, Class III, BMI 40-49.9 (morbid obesity) (HCC)  -stable, Encouraged diet, exercise and weight loss. Dysfibrinogenemia (HCC)  -stable, no current treatment  Encouraged diet, exercise and weight loss. Reviewed health maintenance      Recommendations for Preventive Services Due: see orders and patient instructions/AVS.  Recommended screening schedule for the next 5-10 years is provided to the patient in written form: see Patient Instructions/AVS.     Return for Medicare Annual Wellness Visit in 1 year. Subjective       Patient's complete Health Risk Assessment and screening values have been reviewed and are found in Flowsheets. The following problems were reviewed today and where indicated follow up appointments were made and/or referrals ordered. Positive Risk Factor Screenings with Interventions:                 Weight and Activity:  Physical Activity: Sufficiently Active    Days of Exercise per Week: 3 days    Minutes of Exercise per Session: 60 min     On average, how many days per week do you engage in moderate to strenuous exercise (like a brisk walk)?: 3 days  Have you lost any weight without trying in the past 3 months?: No  Body mass index: (!) 43.41  Obesity Interventions:  See AVS for additional education material                               Objective   Vitals:    02/03/23 1023   BP: 116/78   Pulse: 72   Resp: 14   Weight: 277 lb 3.2 oz (125.7 kg)   Height: 5' 7\" (1.702 m)      Body mass index is 43.42 kg/m².       General Appearance: alert and oriented to person, place and time, well developed and well- nourished, in no acute distress  Skin: warm and dry, no rash or erythema  Head: normocephalic and atraumatic  Eyes: pupils equal, round, and reactive to light, extraocular eye movements intact, conjunctivae normal  ENT: tympanic membrane, external ear and ear canal normal bilaterally, nose without deformity, nasal mucosa and turbinates normal without polyps  Neck: supple and non-tender without mass, no thyromegaly or thyroid nodules, no cervical lymphadenopathy  Pulmonary/Chest: clear to auscultation bilaterally- no wheezes, rales or rhonchi, normal air movement, no respiratory distress  Cardiovascular: normal rate, regular rhythm, normal S1 and S2, no murmurs, rubs, clicks, or gallops, distal pulses intact, no carotid bruits  Abdomen: soft, non-tender, non-distended, normal bowel sounds, no masses or organomegaly  Extremities: no cyanosis, clubbing or edema  Musculoskeletal: normal range of motion, no joint swelling, deformity or tenderness  Neurologic: reflexes normal and symmetric, no cranial nerve deficit, gait, coordination and speech normal       No Known Allergies  Prior to Visit Medications    Medication Sig Taking? Authorizing Provider   meloxicam (MOBIC) 7.5 MG tablet Take 7.5 mg by mouth daily Yes Historical Provider, MD   rosuvastatin (CRESTOR) 10 MG tablet Take 1 tablet by mouth nightly Yes Latha Nguyen MD   sertraline (ZOLOFT) 100 MG tablet Take 100 mg by mouth daily. Yes Historical Provider, MD   ValACYclovir HCl (VALTREX PO) Take  by mouth as needed.  TAKES AS DIRECTED PRN  Yes Historical Provider, MD [place] (Including outside providers/suppliers regularly involved in providing care):   Patient Care Team:  Latha Nguyen MD as PCP - Yang Andrade MD as PCP - Empaneled Provider  Rodolfo Reynaga MD as Obstetrician (Obstetrics & Gynecology)     Reviewed and updated this visit:  Tobacco  Allergies  Meds  Problems  Med Hx  Surg Hx  Soc Hx  Fam Hx Yonny Rodríguez MD

## 2023-02-03 NOTE — PATIENT INSTRUCTIONS
Advance Directives: Care Instructions  Overview  An advance directive is a legal way to state your wishes at the end of your life. It tells your family and your doctor what to do if you can't say what you want. There are two main types of advance directives. You can change them any time your wishes change. Living will. This form tells your family and your doctor your wishes about life support and other treatment. The form is also called a declaration. Medical power of . This form lets you name a person to make treatment decisions for you when you can't speak for yourself. This person is called a health care agent (health care proxy, health care surrogate). The form is also called a durable power of  for health care. If you do not have an advance directive, decisions about your medical care may be made by a family member, or by a doctor or a  who doesn't know you. It may help to think of an advance directive as a gift to the people who care for you. If you have one, they won't have to make tough decisions by themselves. For more information, including forms for your state, see the [address] website (www.caringinfo.org/planning/advance-directives/). Follow-up care is a key part of your treatment and safety. Be sure to make and go to all appointments, and call your doctor if you are having problems. It's also a good idea to know your test results and keep a list of the medicines you take. What should you include in an advance directive? Many states have a unique advance directive form. (It may ask you to address specific issues.) Or you might use a universal form that's approved by many states. If your form doesn't tell you what to address, it may be hard to know what to include in your advance directive. Use the questions below to help you get started. Who do you want to make decisions about your medical care if you are not able to?   What life-support measures do you want if you have a serious illness that gets worse over time or can't be cured? What are you most afraid of that might happen? (Maybe you're afraid of having pain, losing your independence, or being kept alive by machines.)  Where would you prefer to die? (Your home? A hospital? A nursing home?)  Do you want to donate your organs when you die? Do you want certain Nondenominational practices performed before you die? When should you call for help? Be sure to contact your doctor if you have any questions. Where can you learn more? Go to http://www.agudelo.com/ and enter R264 to learn more about \"Advance Directives: Care Instructions. \"  Current as of: June 16, 2022               Content Version: 13.5  © 9699-5500 Healthwise, go[employer]aja.[employer]. Care instructions adapted under license by [facility] ([facility]). If you have questions about a medical condition or this instruction, always ask your healthcare professional. [place] [address] any warranty or liability for your use of this information. Starting a Weight Loss Plan: Care Instructions  Overview     If you're thinking about losing weight, it can be hard to know where to start. Your doctor can help you set up a weight loss plan that best meets your needs. You may want to take a class on nutrition or exercise, or you could join a weight loss support group. If you have questions about how to make changes to your eating or exercise habits, ask your doctor about seeing a registered dietitian or an exercise specialist.  It can be a big challenge to lose weight. But you don't have to make huge changes at once. Make small changes, and stick with them. When those changes become habit, add a few more changes. If you don't think you're ready to make changes right now, try to pick a date in the future. Make an appointment to see your doctor to discuss whether the time is right for you to start a plan. Follow-up care is a key part of your treatment and safety.  Be sure to make and go to all appointments, and call your doctor if you are having problems. It's also a good idea to know your test results and keep a list of the medicines you take. How can you care for yourself at home? Set realistic goals. Many people expect to lose much more weight than is likely. A weight loss of 5% to 10% of your body weight may be enough to improve your health. Get family and friends involved to provide support. Talk to them about why you are trying to lose weight, and ask them to help. They can help by participating in exercise and having meals with you, even if they may be eating something different. Find what works best for you. If you do not have time or do not like to cook, a program that offers meal replacement bars or shakes may be better for you. Or if you like to prepare meals, finding a plan that includes daily menus and recipes may be best.  Ask your doctor about other health professionals who can help you achieve your weight loss goals. A dietitian can help you make healthy changes in your diet. An exercise specialist or  can help you develop a safe and effective exercise program.  A counselor or psychiatrist can help you cope with issues such as depression, anxiety, or family problems that can make it hard to focus on weight loss. Consider joining a support group for people who are trying to lose weight. Your doctor can suggest groups in your area. Where can you learn more? Go to http://www.woods.com/ and enter U357 to learn more about \"Starting a Weight Loss Plan: Care Instructions. \"  Current as of: August 25, 2022               Content Version: 13.5  © 2006-2022 Healthwise, Incorporated. Care instructions adapted under license by [facility] ([facility]).  If you have questions about a medical condition or this instruction, always ask your healthcare professional. [address] any warranty or liability for your use of this information. A Healthy Heart: Care Instructions  Your Care Instructions     Coronary artery disease, also called heart disease, occurs when a substance called plaque builds up in the vessels that supply oxygen-rich blood to your heart muscle. This can narrow the blood vessels and reduce blood flow. A heart attack happens when blood flow is completely blocked. A high-fat diet, smoking, and other factors increase the risk of heart disease. Your doctor has found that you have a chance of having heart disease. You can do lots of things to keep your heart healthy. It may not be easy, but you can change your diet, exercise more, and quit smoking. These steps really work to lower your chance of heart disease. Follow-up care is a key part of your treatment and safety. Be sure to make and go to all appointments, and call your doctor if you are having problems. It's also a good idea to know your test results and keep a list of the medicines you take. How can you care for yourself at home? Diet    Use less salt when you cook and eat. This helps lower your blood pressure. Taste food before salting. Add only a little salt when you think you need it. With time, your taste buds will adjust to less salt.     Eat fewer snack items, fast foods, canned soups, and other high-salt, high-fat, processed foods.     Read food labels and try to avoid saturated and trans fats. They increase your risk of heart disease by raising cholesterol levels.     Limit the amount of solid fat-butter, margarine, and shortening-you eat. Use olive, peanut, or canola oil when you cook. Bake, broil, and steam foods instead of frying them.     Eat a variety of fruit and vegetables every day. Dark green, deep orange, red, or yellow fruits and vegetables are especially good for you. Examples include spinach, carrots, peaches, and berries.     Foods high in fiber can reduce your cholesterol and provide important vitamins and minerals.  High-fiber foods include whole-grain cereals and breads, oatmeal, beans, brown rice, citrus fruits, and apples.     Eat lean proteins. Heart-healthy proteins include seafood, lean meats and poultry, eggs, beans, peas, nuts, seeds, and soy products.     Limit drinks and foods with added sugar. These include candy, desserts, and soda pop. Lifestyle changes    If your doctor recommends it, get more exercise. Walking is a good choice. Bit by bit, increase the amount you walk every day. Try for at least 30 minutes on most days of the week. You also may want to swim, bike, or do other activities.     Do not smoke. If you need help quitting, talk to your doctor about stop-smoking programs and medicines. These can increase your chances of quitting for good. Quitting smoking may be the most important step you can take to protect your heart. It is never too late to quit.     Limit alcohol to 2 drinks a day for men and 1 drink a day for women. Too much alcohol can cause health problems.     Manage other health problems such as diabetes, high blood pressure, and high cholesterol. If you think you may have a problem with alcohol or drug use, talk to your doctor. Medicines    Take your medicines exactly as prescribed. Call your doctor if you think you are having a problem with your medicine.     If your doctor recommends aspirin, take the amount directed each day. Make sure you take aspirin and not another kind of pain reliever, such as acetaminophen (Tylenol). When should you call for help? Call 911 if you have symptoms of a heart attack. These may include:    Chest pain or pressure, or a strange feeling in the chest.     Sweating.     Shortness of breath.     Pain, pressure, or a strange feeling in the back, neck, jaw, or upper belly or in one or both shoulders or arms.     Lightheadedness or sudden weakness.     A fast or irregular heartbeat.    After you call 911, the  may tell you to chew 1 adult-strength or 2 to 4 low-dose aspirin. Wait for an ambulance. Do not try to drive yourself. Watch closely for changes in your health, and be sure to contact your doctor if you have any problems. Where can you learn more? Go to http://www.agudelo.com/ and enter F075 to learn more about \"A Healthy Heart: Care Instructions. \"  Current as of: September 7, 2022               Content Version: 13.5  © 2947-6053 [employer]. Care instructions adapted under license by [facility] ([facility]). If you have questions about a medical condition or this instruction, always ask your healthcare professional. [place] [address] any warranty or liability for your use of this information. Personalized Preventive Plan for Jen Hoang - 2/3/2023  Medicare offers a range of preventive health benefits. Some of the tests and screenings are paid in full while other may be subject to a deductible, co-insurance, and/or copay. Some of these benefits include a comprehensive review of your medical history including lifestyle, illnesses that may run in your family, and various assessments and screenings as appropriate. After reviewing your medical record and screening and assessments performed today your provider may have ordered immunizations, labs, imaging, and/or referrals for you. A list of these orders (if applicable) as well as your Preventive Care list are included within your After Visit Summary for your review. Other Preventive Recommendations:    A preventive eye exam performed by an eye specialist is recommended every 1-2 years to screen for glaucoma; cataracts, macular degeneration, and other eye disorders. A preventive dental visit is recommended every 6 months. Try to get at least 150 minutes of exercise per week or 10,000 steps per day on a pedometer . Order or download the FREE \"Exercise & Physical Activity: Your Everyday Guide\" from [employer].  Call 3-266.622.2290 or search [employer] online. You need 3936-3234 mg of calcium and 5310-4976 IU of vitamin D per day. It is possible to meet your calcium requirement with diet alone, but a vitamin D supplement is usually necessary to meet this goal.  When exposed to the sun, use a sunscreen that protects against both UVA and UVB radiation with an SPF of 30 or greater. Reapply every 2 to 3 hours or after sweating, drying off with a towel, or swimming. Always wear a seat belt when traveling in a car. Always wear a helmet when riding a bicycle or motorcycle.

## 2023-02-06 ENCOUNTER — TELEPHONE (OUTPATIENT)
Dept: FAMILY MEDICINE CLINIC | Age: 68
End: 2023-02-06

## 2023-02-06 NOTE — TELEPHONE ENCOUNTER
----- Message from Sebastián Munoz MD sent at 2/4/2023  9:03 AM EST -----  Cholesterol at 187 with normal CRR. CMP is normal.  CBC is normal.  Labs are good, continue present.

## 2023-04-20 ENCOUNTER — TELEPHONE (OUTPATIENT)
Dept: CARDIOLOGY CLINIC | Age: 68
End: 2023-04-20

## 2023-04-20 NOTE — TELEPHONE ENCOUNTER
Pt last seen by Dr. Park Coil 10-12-22. Pt is needing clearance for Right Total Knee with Dr. Antoni Ventura on 5-18-23.     Fax- [phone]

## 2023-05-03 ENCOUNTER — OFFICE VISIT (OUTPATIENT)
Dept: FAMILY MEDICINE CLINIC | Age: 68
End: 2023-05-03
Payer: MEDICARE

## 2023-05-03 VITALS
RESPIRATION RATE: 18 BRPM | BODY MASS INDEX: 43.84 KG/M2 | SYSTOLIC BLOOD PRESSURE: 126 MMHG | WEIGHT: 272.8 LBS | DIASTOLIC BLOOD PRESSURE: 68 MMHG | HEART RATE: 78 BPM | TEMPERATURE: 97 F | HEIGHT: 66 IN

## 2023-05-03 DIAGNOSIS — M17.11 ARTHRITIS OF RIGHT KNEE: ICD-10-CM

## 2023-05-03 DIAGNOSIS — E78.00 HYPERCHOLESTEREMIA: ICD-10-CM

## 2023-05-03 DIAGNOSIS — Z01.818 PRE-OP EXAMINATION: Primary | ICD-10-CM

## 2023-05-03 PROCEDURE — 99214 OFFICE O/P EST MOD 30 MIN: CPT | Performed by: FAMILY MEDICINE

## 2023-05-03 PROCEDURE — G8400 PT W/DXA NO RESULTS DOC: HCPCS | Performed by: FAMILY MEDICINE

## 2023-05-03 PROCEDURE — 1090F PRES/ABSN URINE INCON ASSESS: CPT | Performed by: FAMILY MEDICINE

## 2023-05-03 PROCEDURE — 1036F TOBACCO NON-USER: CPT | Performed by: FAMILY MEDICINE

## 2023-05-03 PROCEDURE — G8417 CALC BMI ABV UP PARAM F/U: HCPCS | Performed by: FAMILY MEDICINE

## 2023-05-03 PROCEDURE — 1123F ACP DISCUSS/DSCN MKR DOCD: CPT | Performed by: FAMILY MEDICINE

## 2023-05-03 PROCEDURE — G8427 DOCREV CUR MEDS BY ELIG CLIN: HCPCS | Performed by: FAMILY MEDICINE

## 2023-05-03 PROCEDURE — 3017F COLORECTAL CA SCREEN DOC REV: CPT | Performed by: FAMILY MEDICINE

## 2023-05-03 ASSESSMENT — ENCOUNTER SYMPTOMS
SHORTNESS OF BREATH: 0
DIARRHEA: 0
VOMITING: 0
BACK PAIN: 0
WHEEZING: 0
BLOOD IN STOOL: 0
CHEST TIGHTNESS: 0
NAUSEA: 0
SORE THROAT: 0
RHINORRHEA: 0
ABDOMINAL PAIN: 0
COUGH: 0
CONSTIPATION: 0
EYE PAIN: 0

## 2023-05-03 NOTE — PROGRESS NOTES
Ree Dee (:  1955) is a 79 y.o. female,Established patient, here for evaluation of the following chief complaint(s):  Pre-op Exam ([facility] right total knee on 2023)         ASSESSMENT/PLAN:  1. Pre-op examination  2. Hypercholesteremia  3. Arthritis of right knee    Addendum when pre-op work up is available. Addendum 2023:  Reviewed pre-op work up, all is good except INR of 1.4 due to dysfibrinogenemia. After reviewing history, physical, pre-op work up, she is cleared for proposed surgery. Note to Dr. Iveth Paige. Electronically signed by Taryn Knight MD on 2023 at 10:25 AM    No follow-ups on file. Subjective   SUBJECTIVE/OBJECTIVE:  HPI   Pt here for pre- op physical at the request of Dr. Iveth Paige. Scheduled to have right TKR on 2023 at [facility] due to right knee arthritis. Reviewed BMI of 44. Encouraged diet, exercise and weight loss. Reviewed blood work, ok except for PT of 1.4 ( dysfibrinogenemia). Awaiting EKG and CXR. Dr. Renea Mccormack ( cardiology ) has given clearance. Pt has a functional capacity > 4 METS. No previous anesthesia issues. No chest pains or SOB. Last stress test was 10/2022 and was without ischemia. , non smoker, pmh reviewed. Review of Systems   Constitutional:  Negative for chills, fatigue, fever and unexpected weight change. HENT:  Negative for congestion, ear pain, rhinorrhea and sore throat. Eyes:  Negative for pain and visual disturbance. Respiratory:  Negative for cough, chest tightness, shortness of breath and wheezing. Cardiovascular:  Negative for chest pain and palpitations. Gastrointestinal:  Negative for abdominal pain, blood in stool, constipation, diarrhea, nausea and vomiting. Genitourinary:  Negative for difficulty urinating, frequency, hematuria and urgency. Musculoskeletal:  Negative for back pain, joint swelling, myalgias and neck pain. Bilateral knee pain   Skin:  Negative for rash.

## 2023-05-20 ENCOUNTER — APPOINTMENT (OUTPATIENT)
Dept: CT IMAGING | Age: 68
DRG: 312 | End: 2023-05-20
Payer: MEDICARE

## 2023-05-20 ENCOUNTER — HOSPITAL ENCOUNTER (INPATIENT)
Age: 68
LOS: 1 days | Discharge: HOME | DRG: 312 | End: 2023-05-23
Attending: FAMILY MEDICINE | Admitting: INTERNAL MEDICINE
Payer: MEDICARE

## 2023-05-20 DIAGNOSIS — R55 SYNCOPE AND COLLAPSE: ICD-10-CM

## 2023-05-20 DIAGNOSIS — I26.99 ACUTE PULMONARY EMBOLISM WITHOUT ACUTE COR PULMONALE, UNSPECIFIED PULMONARY EMBOLISM TYPE (HCC): Primary | ICD-10-CM

## 2023-05-20 LAB
ANION GAP SERPL CALC-SCNC: 10 MEQ/L (ref 8–16)
APTT PPP: 27.2 SECONDS (ref 22–38)
BASOPHILS ABSOLUTE: 0 THOU/MM3 (ref 0–0.1)
BASOPHILS NFR BLD AUTO: 0.2 %
BUN SERPL-MCNC: 25 MG/DL (ref 7–22)
CALCIUM SERPL-MCNC: 9 MG/DL (ref 8.5–10.5)
CHLORIDE SERPL-SCNC: 106 MEQ/L (ref 98–111)
CO2 SERPL-SCNC: 26 MEQ/L (ref 23–33)
CREAT SERPL-MCNC: 0.9 MG/DL (ref 0.4–1.2)
DEPRECATED RDW RBC AUTO: 47.6 FL (ref 35–45)
EOSINOPHIL NFR BLD AUTO: 0.4 %
EOSINOPHILS ABSOLUTE: 0 THOU/MM3 (ref 0–0.4)
ERYTHROCYTE [DISTWIDTH] IN BLOOD BY AUTOMATED COUNT: 13.2 % (ref 11.5–14.5)
FIBRINOGEN PPP-MCNC: 63 MG/100ML (ref 155–475)
GFR SERPL CREATININE-BSD FRML MDRD: > 60 ML/MIN/1.73M2
GLUCOSE SERPL-MCNC: 116 MG/DL (ref 70–108)
HCT VFR BLD AUTO: 36.7 % (ref 37–47)
HEPARIN UNFRACTIONATED: 0.06 U/ML (ref 0.3–0.7)
HEPARIN UNFRACTIONATED: 1.73 U/ML (ref 0.3–0.7)
HGB BLD-MCNC: 11.5 GM/DL (ref 12–16)
IMM GRANULOCYTES # BLD AUTO: 0.03 THOU/MM3 (ref 0–0.07)
IMM GRANULOCYTES NFR BLD AUTO: 0.3 %
INR PPP: 1.12 (ref 0.85–1.13)
LYMPHOCYTES ABSOLUTE: 1.7 THOU/MM3 (ref 1–4.8)
LYMPHOCYTES NFR BLD AUTO: 17.7 %
MAGNESIUM SERPL-MCNC: 1.8 MG/DL (ref 1.6–2.4)
MCH RBC QN AUTO: 30.7 PG (ref 26–33)
MCHC RBC AUTO-ENTMCNC: 31.3 GM/DL (ref 32.2–35.5)
MCV RBC AUTO: 98.1 FL (ref 81–99)
MONOCYTES ABSOLUTE: 0.8 THOU/MM3 (ref 0.4–1.3)
MONOCYTES NFR BLD AUTO: 8.2 %
NEUTROPHILS NFR BLD AUTO: 73.2 %
NRBC BLD AUTO-RTO: 0 /100 WBC
NT-PROBNP SERPL IA-MCNC: 414.4 PG/ML (ref 0–124)
OSMOLALITY SERPL CALC.SUM OF ELEC: 288.5 MOSMOL/KG (ref 275–300)
PLATELET # BLD AUTO: 212 THOU/MM3 (ref 130–400)
PMV BLD AUTO: 10 FL (ref 9.4–12.4)
POTASSIUM SERPL-SCNC: 3.9 MEQ/L (ref 3.5–5.2)
RBC # BLD AUTO: 3.74 MILL/MM3 (ref 4.2–5.4)
SEGMENTED NEUTROPHILS ABSOLUTE COUNT: 7.1 THOU/MM3 (ref 1.8–7.7)
SODIUM SERPL-SCNC: 142 MEQ/L (ref 135–145)
T4 FREE SERPL-MCNC: 1.51 NG/DL (ref 0.93–1.76)
TROPONIN T: < 0.01 NG/ML
TSH SERPL DL<=0.005 MIU/L-ACNC: 2.08 UIU/ML (ref 0.4–4.2)
WBC # BLD AUTO: 9.7 THOU/MM3 (ref 4.8–10.8)

## 2023-05-20 PROCEDURE — 84439 ASSAY OF FREE THYROXINE: CPT

## 2023-05-20 PROCEDURE — 85025 COMPLETE CBC W/AUTO DIFF WBC: CPT

## 2023-05-20 PROCEDURE — 71275 CT ANGIOGRAPHY CHEST: CPT

## 2023-05-20 PROCEDURE — 6360000002 HC RX W HCPCS: Performed by: STUDENT IN AN ORGANIZED HEALTH CARE EDUCATION/TRAINING PROGRAM

## 2023-05-20 PROCEDURE — 6370000000 HC RX 637 (ALT 250 FOR IP): Performed by: PHYSICIAN ASSISTANT

## 2023-05-20 PROCEDURE — 85610 PROTHROMBIN TIME: CPT

## 2023-05-20 PROCEDURE — 85730 THROMBOPLASTIN TIME PARTIAL: CPT

## 2023-05-20 PROCEDURE — G0378 HOSPITAL OBSERVATION PER HR: HCPCS

## 2023-05-20 PROCEDURE — 36415 COLL VENOUS BLD VENIPUNCTURE: CPT

## 2023-05-20 PROCEDURE — 93005 ELECTROCARDIOGRAM TRACING: CPT | Performed by: STUDENT IN AN ORGANIZED HEALTH CARE EDUCATION/TRAINING PROGRAM

## 2023-05-20 PROCEDURE — 84443 ASSAY THYROID STIM HORMONE: CPT

## 2023-05-20 PROCEDURE — 85384 FIBRINOGEN ACTIVITY: CPT

## 2023-05-20 PROCEDURE — 80048 BASIC METABOLIC PNL TOTAL CA: CPT

## 2023-05-20 PROCEDURE — 99223 1ST HOSP IP/OBS HIGH 75: CPT | Performed by: PHYSICIAN ASSISTANT

## 2023-05-20 PROCEDURE — 2580000003 HC RX 258: Performed by: STUDENT IN AN ORGANIZED HEALTH CARE EDUCATION/TRAINING PROGRAM

## 2023-05-20 PROCEDURE — 83735 ASSAY OF MAGNESIUM: CPT

## 2023-05-20 PROCEDURE — 83880 ASSAY OF NATRIURETIC PEPTIDE: CPT

## 2023-05-20 PROCEDURE — 99285 EMERGENCY DEPT VISIT HI MDM: CPT

## 2023-05-20 PROCEDURE — 84484 ASSAY OF TROPONIN QUANT: CPT

## 2023-05-20 PROCEDURE — 85520 HEPARIN ASSAY: CPT

## 2023-05-20 PROCEDURE — 6360000004 HC RX CONTRAST MEDICATION: Performed by: STUDENT IN AN ORGANIZED HEALTH CARE EDUCATION/TRAINING PROGRAM

## 2023-05-20 RX ORDER — HEPARIN SODIUM 1000 [USP'U]/ML
40 INJECTION, SOLUTION INTRAVENOUS; SUBCUTANEOUS PRN
Status: DISCONTINUED | OUTPATIENT
Start: 2023-05-20 | End: 2023-05-21

## 2023-05-20 RX ORDER — SODIUM CHLORIDE 0.9 % (FLUSH) 0.9 %
5-40 SYRINGE (ML) INJECTION EVERY 12 HOURS SCHEDULED
Status: DISCONTINUED | OUTPATIENT
Start: 2023-05-20 | End: 2023-05-23 | Stop reason: HOSPADM

## 2023-05-20 RX ORDER — ACETAMINOPHEN 650 MG/1
650 SUPPOSITORY RECTAL EVERY 6 HOURS PRN
Status: DISCONTINUED | OUTPATIENT
Start: 2023-05-20 | End: 2023-05-23 | Stop reason: HOSPADM

## 2023-05-20 RX ORDER — POLYETHYLENE GLYCOL 3350 17 G/17G
17 POWDER, FOR SOLUTION ORAL DAILY PRN
Status: DISCONTINUED | OUTPATIENT
Start: 2023-05-20 | End: 2023-05-23 | Stop reason: HOSPADM

## 2023-05-20 RX ORDER — 0.9 % SODIUM CHLORIDE 0.9 %
1000 INTRAVENOUS SOLUTION INTRAVENOUS ONCE
Status: COMPLETED | OUTPATIENT
Start: 2023-05-20 | End: 2023-05-20

## 2023-05-20 RX ORDER — SERTRALINE HYDROCHLORIDE 100 MG/1
100 TABLET, FILM COATED ORAL NIGHTLY
Status: DISCONTINUED | OUTPATIENT
Start: 2023-05-20 | End: 2023-05-23 | Stop reason: HOSPADM

## 2023-05-20 RX ORDER — ROSUVASTATIN CALCIUM 10 MG/1
10 TABLET, COATED ORAL NIGHTLY
Status: DISCONTINUED | OUTPATIENT
Start: 2023-05-20 | End: 2023-05-23 | Stop reason: HOSPADM

## 2023-05-20 RX ORDER — ONDANSETRON 4 MG/1
4 TABLET, ORALLY DISINTEGRATING ORAL EVERY 8 HOURS PRN
Status: DISCONTINUED | OUTPATIENT
Start: 2023-05-20 | End: 2023-05-23 | Stop reason: HOSPADM

## 2023-05-20 RX ORDER — HEPARIN SODIUM 1000 [USP'U]/ML
80 INJECTION, SOLUTION INTRAVENOUS; SUBCUTANEOUS ONCE
Status: COMPLETED | OUTPATIENT
Start: 2023-05-20 | End: 2023-05-20

## 2023-05-20 RX ORDER — HEPARIN SODIUM 1000 [USP'U]/ML
80 INJECTION, SOLUTION INTRAVENOUS; SUBCUTANEOUS PRN
Status: DISCONTINUED | OUTPATIENT
Start: 2023-05-20 | End: 2023-05-21

## 2023-05-20 RX ORDER — ACETAMINOPHEN 500 MG
500 TABLET ORAL EVERY 6 HOURS PRN
COMMUNITY

## 2023-05-20 RX ORDER — ONDANSETRON 2 MG/ML
4 INJECTION INTRAMUSCULAR; INTRAVENOUS EVERY 6 HOURS PRN
Status: DISCONTINUED | OUTPATIENT
Start: 2023-05-20 | End: 2023-05-23 | Stop reason: HOSPADM

## 2023-05-20 RX ORDER — HYDROCODONE BITARTRATE AND ACETAMINOPHEN 5; 325 MG/1; MG/1
1 TABLET ORAL EVERY 6 HOURS PRN
COMMUNITY

## 2023-05-20 RX ORDER — SODIUM CHLORIDE 9 MG/ML
INJECTION, SOLUTION INTRAVENOUS PRN
Status: DISCONTINUED | OUTPATIENT
Start: 2023-05-20 | End: 2023-05-23 | Stop reason: HOSPADM

## 2023-05-20 RX ORDER — SODIUM CHLORIDE 0.9 % (FLUSH) 0.9 %
5-40 SYRINGE (ML) INJECTION PRN
Status: DISCONTINUED | OUTPATIENT
Start: 2023-05-20 | End: 2023-05-23 | Stop reason: HOSPADM

## 2023-05-20 RX ORDER — HEPARIN SODIUM 10000 [USP'U]/100ML
5-30 INJECTION, SOLUTION INTRAVENOUS CONTINUOUS
Status: DISCONTINUED | OUTPATIENT
Start: 2023-05-20 | End: 2023-05-21

## 2023-05-20 RX ORDER — M-VIT,TX,IRON,MINS/CALC/FOLIC 27MG-0.4MG
1 TABLET ORAL DAILY
COMMUNITY

## 2023-05-20 RX ORDER — HYDROCODONE BITARTRATE AND ACETAMINOPHEN 5; 325 MG/1; MG/1
1 TABLET ORAL EVERY 4 HOURS PRN
Status: DISCONTINUED | OUTPATIENT
Start: 2023-05-20 | End: 2023-05-23 | Stop reason: HOSPADM

## 2023-05-20 RX ORDER — ACETAMINOPHEN 325 MG/1
650 TABLET ORAL EVERY 6 HOURS PRN
Status: DISCONTINUED | OUTPATIENT
Start: 2023-05-20 | End: 2023-05-23 | Stop reason: HOSPADM

## 2023-05-20 RX ORDER — PANTOPRAZOLE SODIUM 20 MG/1
20 TABLET, DELAYED RELEASE ORAL DAILY
COMMUNITY

## 2023-05-20 RX ADMIN — SERTRALINE HYDROCHLORIDE 100 MG: 100 TABLET, FILM COATED ORAL at 20:18

## 2023-05-20 RX ADMIN — SODIUM CHLORIDE 1000 ML: 9 INJECTION, SOLUTION INTRAVENOUS at 13:00

## 2023-05-20 RX ADMIN — HYDROCODONE BITARTRATE AND ACETAMINOPHEN 1 TABLET: 5; 325 TABLET ORAL at 16:05

## 2023-05-20 RX ADMIN — HEPARIN SODIUM 9870 UNITS: 1000 INJECTION INTRAVENOUS; SUBCUTANEOUS at 14:56

## 2023-05-20 RX ADMIN — IOPAMIDOL 80 ML: 755 INJECTION, SOLUTION INTRAVENOUS at 13:24

## 2023-05-20 RX ADMIN — ROSUVASTATIN CALCIUM 10 MG: 10 TABLET, COATED ORAL at 20:18

## 2023-05-20 RX ADMIN — ACETAMINOPHEN 650 MG: 325 TABLET ORAL at 23:37

## 2023-05-20 RX ADMIN — HEPARIN SODIUM 17 UNITS/KG/HR: 10000 INJECTION, SOLUTION INTRAVENOUS at 15:01

## 2023-05-20 RX ADMIN — HYDROCODONE BITARTRATE AND ACETAMINOPHEN 1 TABLET: 5; 325 TABLET ORAL at 20:35

## 2023-05-20 ASSESSMENT — PAIN DESCRIPTION - ONSET
ONSET: ON-GOING
ONSET: ON-GOING

## 2023-05-20 ASSESSMENT — PAIN DESCRIPTION - DESCRIPTORS
DESCRIPTORS: THROBBING
DESCRIPTORS: THROBBING
DESCRIPTORS: DISCOMFORT

## 2023-05-20 ASSESSMENT — PAIN DESCRIPTION - ORIENTATION
ORIENTATION: RIGHT

## 2023-05-20 ASSESSMENT — PAIN SCALES - GENERAL
PAINLEVEL_OUTOF10: 3
PAINLEVEL_OUTOF10: 4
PAINLEVEL_OUTOF10: 7
PAINLEVEL_OUTOF10: 4
PAINLEVEL_OUTOF10: 2

## 2023-05-20 ASSESSMENT — PAIN - FUNCTIONAL ASSESSMENT
PAIN_FUNCTIONAL_ASSESSMENT: NONE - DENIES PAIN
PAIN_FUNCTIONAL_ASSESSMENT: ACTIVITIES ARE NOT PREVENTED
PAIN_FUNCTIONAL_ASSESSMENT: ACTIVITIES ARE NOT PREVENTED
PAIN_FUNCTIONAL_ASSESSMENT: 0-10
PAIN_FUNCTIONAL_ASSESSMENT: ACTIVITIES ARE NOT PREVENTED
PAIN_FUNCTIONAL_ASSESSMENT: NONE - DENIES PAIN
PAIN_FUNCTIONAL_ASSESSMENT: ACTIVITIES ARE NOT PREVENTED

## 2023-05-20 ASSESSMENT — PAIN DESCRIPTION - LOCATION
LOCATION: KNEE

## 2023-05-20 ASSESSMENT — PAIN DESCRIPTION - FREQUENCY
FREQUENCY: CONTINUOUS
FREQUENCY: CONTINUOUS

## 2023-05-20 ASSESSMENT — PAIN DESCRIPTION - PAIN TYPE: TYPE: ACUTE PAIN

## 2023-05-21 LAB
ANION GAP SERPL CALC-SCNC: 12 MEQ/L (ref 8–16)
BASOPHILS ABSOLUTE: 0 THOU/MM3 (ref 0–0.1)
BASOPHILS NFR BLD AUTO: 0.4 %
BUN SERPL-MCNC: 14 MG/DL (ref 7–22)
CALCIUM SERPL-MCNC: 8.9 MG/DL (ref 8.5–10.5)
CHLORIDE SERPL-SCNC: 104 MEQ/L (ref 98–111)
CO2 SERPL-SCNC: 26 MEQ/L (ref 23–33)
CREAT SERPL-MCNC: 0.8 MG/DL (ref 0.4–1.2)
DEPRECATED MEAN GLUCOSE BLD GHB EST-ACNC: 114 MG/DL (ref 70–126)
DEPRECATED RDW RBC AUTO: 49.1 FL (ref 35–45)
EKG ATRIAL RATE: 61 BPM
EKG P AXIS: 70 DEGREES
EKG P-R INTERVAL: 190 MS
EKG Q-T INTERVAL: 442 MS
EKG QRS DURATION: 82 MS
EKG QTC CALCULATION (BAZETT): 444 MS
EKG R AXIS: 7 DEGREES
EKG T AXIS: 22 DEGREES
EKG VENTRICULAR RATE: 61 BPM
EOSINOPHIL NFR BLD AUTO: 1.1 %
EOSINOPHILS ABSOLUTE: 0.1 THOU/MM3 (ref 0–0.4)
ERYTHROCYTE [DISTWIDTH] IN BLOOD BY AUTOMATED COUNT: 13.3 % (ref 11.5–14.5)
FERRITIN SERPL IA-MCNC: 219 NG/ML (ref 10–291)
FIBRINOGEN PPP-MCNC: 87 MG/100ML (ref 155–475)
FOLATE SERPL-MCNC: 5.5 NG/ML (ref 4.8–24.2)
GFR SERPL CREATININE-BSD FRML MDRD: > 60 ML/MIN/1.73M2
GLUCOSE SERPL-MCNC: 128 MG/DL (ref 70–108)
HBA1C MFR BLD HPLC: 5.8 % (ref 4.4–6.4)
HCT VFR BLD AUTO: 33.9 % (ref 37–47)
HEPARIN UNFRACTIONATED: 0.6 U/ML (ref 0.3–0.7)
HEPARIN UNFRACTIONATED: 1.05 U/ML (ref 0.3–0.7)
HGB BLD-MCNC: 10.6 GM/DL (ref 12–16)
IMM GRANULOCYTES # BLD AUTO: 0.06 THOU/MM3 (ref 0–0.07)
IMM GRANULOCYTES NFR BLD AUTO: 0.6 %
IRON SATN MFR SERPL: 11 % (ref 20–50)
IRON SERPL-MCNC: 26 UG/DL (ref 50–170)
LYMPHOCYTES ABSOLUTE: 1.4 THOU/MM3 (ref 1–4.8)
LYMPHOCYTES NFR BLD AUTO: 14.7 %
MCH RBC QN AUTO: 31.5 PG (ref 26–33)
MCHC RBC AUTO-ENTMCNC: 31.3 GM/DL (ref 32.2–35.5)
MCV RBC AUTO: 100.9 FL (ref 81–99)
MONOCYTES ABSOLUTE: 0.8 THOU/MM3 (ref 0.4–1.3)
MONOCYTES NFR BLD AUTO: 7.8 %
NEUTROPHILS NFR BLD AUTO: 75.4 %
NRBC BLD AUTO-RTO: 0 /100 WBC
PLATELET # BLD AUTO: 211 THOU/MM3 (ref 130–400)
PMV BLD AUTO: 10 FL (ref 9.4–12.4)
POTASSIUM SERPL-SCNC: 4.2 MEQ/L (ref 3.5–5.2)
RBC # BLD AUTO: 3.36 MILL/MM3 (ref 4.2–5.4)
SEGMENTED NEUTROPHILS ABSOLUTE COUNT: 7.4 THOU/MM3 (ref 1.8–7.7)
SODIUM SERPL-SCNC: 142 MEQ/L (ref 135–145)
TIBC SERPL-MCNC: 236 UG/DL (ref 171–450)
VIT B12 SERPL-MCNC: 579 PG/ML (ref 211–911)
WBC # BLD AUTO: 9.8 THOU/MM3 (ref 4.8–10.8)

## 2023-05-21 PROCEDURE — 82607 VITAMIN B-12: CPT

## 2023-05-21 PROCEDURE — 97116 GAIT TRAINING THERAPY: CPT

## 2023-05-21 PROCEDURE — 6370000000 HC RX 637 (ALT 250 FOR IP): Performed by: PHYSICIAN ASSISTANT

## 2023-05-21 PROCEDURE — 85520 HEPARIN ASSAY: CPT

## 2023-05-21 PROCEDURE — G0378 HOSPITAL OBSERVATION PER HR: HCPCS

## 2023-05-21 PROCEDURE — 6370000000 HC RX 637 (ALT 250 FOR IP): Performed by: NURSE PRACTITIONER

## 2023-05-21 PROCEDURE — 83550 IRON BINDING TEST: CPT

## 2023-05-21 PROCEDURE — 2580000003 HC RX 258: Performed by: STUDENT IN AN ORGANIZED HEALTH CARE EDUCATION/TRAINING PROGRAM

## 2023-05-21 PROCEDURE — 83540 ASSAY OF IRON: CPT

## 2023-05-21 PROCEDURE — 80048 BASIC METABOLIC PNL TOTAL CA: CPT

## 2023-05-21 PROCEDURE — 82728 ASSAY OF FERRITIN: CPT

## 2023-05-21 PROCEDURE — 99232 SBSQ HOSP IP/OBS MODERATE 35: CPT | Performed by: STUDENT IN AN ORGANIZED HEALTH CARE EDUCATION/TRAINING PROGRAM

## 2023-05-21 PROCEDURE — 82746 ASSAY OF FOLIC ACID SERUM: CPT

## 2023-05-21 PROCEDURE — 6360000002 HC RX W HCPCS: Performed by: STUDENT IN AN ORGANIZED HEALTH CARE EDUCATION/TRAINING PROGRAM

## 2023-05-21 PROCEDURE — 97110 THERAPEUTIC EXERCISES: CPT

## 2023-05-21 PROCEDURE — 85384 FIBRINOGEN ACTIVITY: CPT

## 2023-05-21 PROCEDURE — 85025 COMPLETE CBC W/AUTO DIFF WBC: CPT

## 2023-05-21 PROCEDURE — 97162 PT EVAL MOD COMPLEX 30 MIN: CPT

## 2023-05-21 PROCEDURE — 93010 ELECTROCARDIOGRAM REPORT: CPT | Performed by: INTERNAL MEDICINE

## 2023-05-21 PROCEDURE — 36415 COLL VENOUS BLD VENIPUNCTURE: CPT

## 2023-05-21 PROCEDURE — 83036 HEMOGLOBIN GLYCOSYLATED A1C: CPT

## 2023-05-21 RX ORDER — SODIUM CHLORIDE 9 MG/ML
INJECTION, SOLUTION INTRAVENOUS CONTINUOUS
Status: DISCONTINUED | OUTPATIENT
Start: 2023-05-21 | End: 2023-05-23 | Stop reason: HOSPADM

## 2023-05-21 RX ORDER — ENOXAPARIN SODIUM 150 MG/ML
1 INJECTION SUBCUTANEOUS 2 TIMES DAILY
Qty: 39 ML | Refills: 0 | Status: CANCELLED | OUTPATIENT
Start: 2023-05-21

## 2023-05-21 RX ORDER — BISACODYL 5 MG/1
5 TABLET, DELAYED RELEASE ORAL DAILY PRN
Status: DISCONTINUED | OUTPATIENT
Start: 2023-05-21 | End: 2023-05-23 | Stop reason: HOSPADM

## 2023-05-21 RX ORDER — DOCUSATE SODIUM 100 MG/1
100 CAPSULE, LIQUID FILLED ORAL 2 TIMES DAILY
Status: DISCONTINUED | OUTPATIENT
Start: 2023-05-21 | End: 2023-05-23 | Stop reason: HOSPADM

## 2023-05-21 RX ORDER — SENNA PLUS 8.6 MG/1
1 TABLET ORAL NIGHTLY
Status: DISCONTINUED | OUTPATIENT
Start: 2023-05-21 | End: 2023-05-23 | Stop reason: HOSPADM

## 2023-05-21 RX ORDER — ENOXAPARIN SODIUM 150 MG/ML
1 INJECTION SUBCUTANEOUS 2 TIMES DAILY
Status: DISCONTINUED | OUTPATIENT
Start: 2023-05-21 | End: 2023-05-23 | Stop reason: HOSPADM

## 2023-05-21 RX ADMIN — HYDROCODONE BITARTRATE AND ACETAMINOPHEN 1 TABLET: 5; 325 TABLET ORAL at 17:54

## 2023-05-21 RX ADMIN — DOCUSATE SODIUM 100 MG: 100 CAPSULE, LIQUID FILLED ORAL at 20:47

## 2023-05-21 RX ADMIN — HYDROCODONE BITARTRATE AND ACETAMINOPHEN 1 TABLET: 5; 325 TABLET ORAL at 10:17

## 2023-05-21 RX ADMIN — HYDROCODONE BITARTRATE AND ACETAMINOPHEN 1 TABLET: 5; 325 TABLET ORAL at 03:09

## 2023-05-21 RX ADMIN — ACETAMINOPHEN 650 MG: 325 TABLET ORAL at 14:03

## 2023-05-21 RX ADMIN — SENNOSIDES 8.6 MG: 8.6 TABLET, FILM COATED ORAL at 20:47

## 2023-05-21 RX ADMIN — ENOXAPARIN SODIUM 120 MG: 150 INJECTION SUBCUTANEOUS at 13:47

## 2023-05-21 RX ADMIN — SODIUM CHLORIDE: 9 INJECTION, SOLUTION INTRAVENOUS at 16:02

## 2023-05-21 RX ADMIN — ENOXAPARIN SODIUM 120 MG: 150 INJECTION SUBCUTANEOUS at 20:35

## 2023-05-21 RX ADMIN — HEPARIN SODIUM 11 UNITS/KG/HR: 10000 INJECTION, SOLUTION INTRAVENOUS at 04:52

## 2023-05-21 RX ADMIN — IRON SUCROSE 300 MG: 20 INJECTION, SOLUTION INTRAVENOUS at 18:49

## 2023-05-21 RX ADMIN — SERTRALINE HYDROCHLORIDE 100 MG: 100 TABLET, FILM COATED ORAL at 20:39

## 2023-05-21 RX ADMIN — ROSUVASTATIN CALCIUM 10 MG: 10 TABLET, COATED ORAL at 20:38

## 2023-05-21 ASSESSMENT — ENCOUNTER SYMPTOMS
ABDOMINAL PAIN: 0
COUGH: 0
NAUSEA: 0
SHORTNESS OF BREATH: 0
RHINORRHEA: 0
VOMITING: 0
SORE THROAT: 0

## 2023-05-21 ASSESSMENT — PAIN DESCRIPTION - LOCATION
LOCATION: KNEE

## 2023-05-21 ASSESSMENT — PAIN DESCRIPTION - DESCRIPTORS
DESCRIPTORS: DISCOMFORT
DESCRIPTORS: SHARP
DESCRIPTORS: ACHING
DESCRIPTORS: DULL
DESCRIPTORS: ACHING

## 2023-05-21 ASSESSMENT — PAIN DESCRIPTION - ONSET
ONSET: ON-GOING
ONSET: GRADUAL

## 2023-05-21 ASSESSMENT — PAIN DESCRIPTION - FREQUENCY
FREQUENCY: CONTINUOUS
FREQUENCY: INTERMITTENT

## 2023-05-21 ASSESSMENT — PAIN SCALES - GENERAL
PAINLEVEL_OUTOF10: 2
PAINLEVEL_OUTOF10: 4
PAINLEVEL_OUTOF10: 1
PAINLEVEL_OUTOF10: 8
PAINLEVEL_OUTOF10: 6
PAINLEVEL_OUTOF10: 2
PAINLEVEL_OUTOF10: 5

## 2023-05-21 ASSESSMENT — PAIN DESCRIPTION - ORIENTATION
ORIENTATION: RIGHT

## 2023-05-21 ASSESSMENT — PAIN - FUNCTIONAL ASSESSMENT
PAIN_FUNCTIONAL_ASSESSMENT: ACTIVITIES ARE NOT PREVENTED
PAIN_FUNCTIONAL_ASSESSMENT: PREVENTS OR INTERFERES SOME ACTIVE ACTIVITIES AND ADLS
PAIN_FUNCTIONAL_ASSESSMENT: ACTIVITIES ARE NOT PREVENTED

## 2023-05-21 ASSESSMENT — PAIN DESCRIPTION - PAIN TYPE
TYPE: ACUTE PAIN
TYPE: ACUTE PAIN

## 2023-05-22 ENCOUNTER — APPOINTMENT (OUTPATIENT)
Dept: INTERVENTIONAL RADIOLOGY/VASCULAR | Age: 68
DRG: 312 | End: 2023-05-22
Payer: MEDICARE

## 2023-05-22 DIAGNOSIS — D68.2 DYSFIBRINOGENEMIA (HCC): Primary | ICD-10-CM

## 2023-05-22 PROBLEM — R55 SYNCOPE AND COLLAPSE: Status: ACTIVE | Noted: 2023-05-22

## 2023-05-22 PROBLEM — I26.99 ACUTE PULMONARY EMBOLISM WITHOUT ACUTE COR PULMONALE, UNSPECIFIED PULMONARY EMBOLISM TYPE (HCC): Status: ACTIVE | Noted: 2023-05-22

## 2023-05-22 LAB
FIBRINOGEN PPP-MCNC: 91 MG/100ML (ref 155–475)
LV EF: 60 %
LVEF MODALITY: NORMAL

## 2023-05-22 PROCEDURE — 36415 COLL VENOUS BLD VENIPUNCTURE: CPT

## 2023-05-22 PROCEDURE — 1200000000 HC SEMI PRIVATE

## 2023-05-22 PROCEDURE — 93306 TTE W/DOPPLER COMPLETE: CPT

## 2023-05-22 PROCEDURE — 97110 THERAPEUTIC EXERCISES: CPT

## 2023-05-22 PROCEDURE — G0378 HOSPITAL OBSERVATION PER HR: HCPCS

## 2023-05-22 PROCEDURE — 6370000000 HC RX 637 (ALT 250 FOR IP): Performed by: NURSE PRACTITIONER

## 2023-05-22 PROCEDURE — 2580000003 HC RX 258: Performed by: STUDENT IN AN ORGANIZED HEALTH CARE EDUCATION/TRAINING PROGRAM

## 2023-05-22 PROCEDURE — 2580000003 HC RX 258: Performed by: PHYSICIAN ASSISTANT

## 2023-05-22 PROCEDURE — 6360000002 HC RX W HCPCS: Performed by: STUDENT IN AN ORGANIZED HEALTH CARE EDUCATION/TRAINING PROGRAM

## 2023-05-22 PROCEDURE — 99222 1ST HOSP IP/OBS MODERATE 55: CPT | Performed by: PHYSICIAN ASSISTANT

## 2023-05-22 PROCEDURE — 97530 THERAPEUTIC ACTIVITIES: CPT

## 2023-05-22 PROCEDURE — 93880 EXTRACRANIAL BILAT STUDY: CPT

## 2023-05-22 PROCEDURE — 85384 FIBRINOGEN ACTIVITY: CPT

## 2023-05-22 PROCEDURE — 6370000000 HC RX 637 (ALT 250 FOR IP): Performed by: PHYSICIAN ASSISTANT

## 2023-05-22 RX ADMIN — ACETAMINOPHEN 650 MG: 325 TABLET ORAL at 23:30

## 2023-05-22 RX ADMIN — ENOXAPARIN SODIUM 120 MG: 150 INJECTION SUBCUTANEOUS at 20:32

## 2023-05-22 RX ADMIN — SODIUM CHLORIDE, PRESERVATIVE FREE 10 ML: 5 INJECTION INTRAVENOUS at 20:32

## 2023-05-22 RX ADMIN — HYDROCODONE BITARTRATE AND ACETAMINOPHEN 1 TABLET: 5; 325 TABLET ORAL at 20:32

## 2023-05-22 RX ADMIN — HYDROCODONE BITARTRATE AND ACETAMINOPHEN 1 TABLET: 5; 325 TABLET ORAL at 00:28

## 2023-05-22 RX ADMIN — ENOXAPARIN SODIUM 120 MG: 150 INJECTION SUBCUTANEOUS at 09:23

## 2023-05-22 RX ADMIN — IRON SUCROSE 300 MG: 20 INJECTION, SOLUTION INTRAVENOUS at 20:38

## 2023-05-22 RX ADMIN — SODIUM CHLORIDE: 9 INJECTION, SOLUTION INTRAVENOUS at 09:23

## 2023-05-22 RX ADMIN — ROSUVASTATIN CALCIUM 10 MG: 10 TABLET, COATED ORAL at 20:32

## 2023-05-22 RX ADMIN — HYDROCODONE BITARTRATE AND ACETAMINOPHEN 1 TABLET: 5; 325 TABLET ORAL at 14:37

## 2023-05-22 RX ADMIN — ACETAMINOPHEN 650 MG: 325 TABLET ORAL at 13:44

## 2023-05-22 RX ADMIN — SENNOSIDES 8.6 MG: 8.6 TABLET, FILM COATED ORAL at 20:32

## 2023-05-22 RX ADMIN — SERTRALINE HYDROCHLORIDE 100 MG: 100 TABLET, FILM COATED ORAL at 20:32

## 2023-05-22 RX ADMIN — DOCUSATE SODIUM 100 MG: 100 CAPSULE, LIQUID FILLED ORAL at 20:32

## 2023-05-22 RX ADMIN — HYDROCODONE BITARTRATE AND ACETAMINOPHEN 1 TABLET: 5; 325 TABLET ORAL at 09:15

## 2023-05-22 ASSESSMENT — PAIN - FUNCTIONAL ASSESSMENT
PAIN_FUNCTIONAL_ASSESSMENT: ACTIVITIES ARE NOT PREVENTED
PAIN_FUNCTIONAL_ASSESSMENT: PREVENTS OR INTERFERES SOME ACTIVE ACTIVITIES AND ADLS

## 2023-05-22 ASSESSMENT — PAIN DESCRIPTION - FREQUENCY
FREQUENCY: CONTINUOUS
FREQUENCY: INTERMITTENT

## 2023-05-22 ASSESSMENT — PAIN DESCRIPTION - LOCATION
LOCATION: KNEE

## 2023-05-22 ASSESSMENT — PAIN DESCRIPTION - ORIENTATION
ORIENTATION: RIGHT
ORIENTATION: RIGHT;MID
ORIENTATION: RIGHT

## 2023-05-22 ASSESSMENT — PAIN SCALES - GENERAL
PAINLEVEL_OUTOF10: 6
PAINLEVEL_OUTOF10: 6
PAINLEVEL_OUTOF10: 4
PAINLEVEL_OUTOF10: 5
PAINLEVEL_OUTOF10: 10
PAINLEVEL_OUTOF10: 2
PAINLEVEL_OUTOF10: 6

## 2023-05-22 ASSESSMENT — PAIN DESCRIPTION - PAIN TYPE
TYPE: SURGICAL PAIN
TYPE: SURGICAL PAIN

## 2023-05-22 ASSESSMENT — PAIN DESCRIPTION - DESCRIPTORS
DESCRIPTORS: DISCOMFORT
DESCRIPTORS: ACHING
DESCRIPTORS: ACHING
DESCRIPTORS: STABBING

## 2023-05-22 ASSESSMENT — PAIN DESCRIPTION - ONSET
ONSET: ON-GOING
ONSET: ON-GOING

## 2023-05-22 ASSESSMENT — PAIN SCALES - WONG BAKER: WONGBAKER_NUMERICALRESPONSE: 0

## 2023-05-22 NOTE — CARE COORDINATION
Case Management Assessment  Initial Evaluation    Date/Time of Evaluation: 5/22/2023 12:49 PM  Assessment Completed by: Marycruz Li RN    If patient is discharged prior to next notation, then this note serves as note for discharge by case management. Patient Name: Regi Sparks                   YOB: 1955  Diagnosis: Syncope and collapse [R55]  Acute pulmonary embolism without acute cor pulmonale, unspecified pulmonary embolism type ([address] [I26.99]  Pulmonary embolism without acute cor pulmonale, unspecified chronicity, unspecified pulmonary embolism type ([address] [I26.99]                   Date / Time: 5/20/2023 12:17 PM  Location: [address] [place] [address]     Patient Admission Status: Observation   Readmission Risk Low 0-14, Mod 15-19), High > 20: No data recorded  Current PCP: Christina Oscar MD  PCP verified by CM? Yes    Chart Reviewed: Yes      History Provided by: Patient  Patient Orientation: Alert and Oriented    Patient Cognition: Alert    Hospitalization in the last 30 days (Readmission):  No    If yes, Readmission Assessment in  Navigator will be completed. Advance Directives:      Code Status: Full Code   Patient's Primary Decision Maker is: Legal Next of Kin    Primary Decision MakerStian Reyes Spouse - [phone]    Discharge Planning:    Patient lives with: Spouse/Significant Other Type of Home: House  Primary Care Giver: Self  Patient Support Systems include: Spouse/Significant Other   Current Financial resources: Medicare (and BCBS and Cigna for meds)  Current community resources:    Current services prior to admission: Durable Medical Equipment            Current DME: Walker, Shower Chair            Type of Home Care services:  None    ADLS  Prior functional level: Assistance with the following:, Cooking, Housework, Shopping  Current functional level: Assistance with the following:, Cooking, Housework, Shopping    Family can provide assistance at DC:  Yes  Would you like Case

## 2023-05-22 NOTE — ACP (ADVANCE CARE PLANNING)
Advance Care Planning     Advance Care Planning Inpatient Note  [facility] Department    Today's Date: 5/21/2023  Unit: [facility]    Received request from IDT Member. Upon review of chart and communication with care team, patient's decision making abilities are not in question. . Patient and Child/Children was/were present in the room during visit. Goals of ACP Conversation:  Discuss advance care planning documents    Health Care Decision Makers:       Primary Decision Maker: Jessica Gustavo  [phone]  Summary:  Documented Next of Kin, per patient report    Advance Care Planning Documents (Patient Wishes):  None     Assessment:  Updated patient's parish membership. Good conversation with patient's daughter present about the features and benefits of HCPOA. Patient intends to complete when  is present. Daughter also interested in completing.      Interventions:  Provided education on documents for clarity and greater understanding  Discussed and provided education on state decision maker hierarchy  Encouraged ongoing ACP conversation with future decision makers and loved ones  Reviewed but did not complete ACP document    Care Preferences Communicated:   No    Outcomes/Plan:  ACP Discussion: Completed    Electronically signed by Chaplain Schuyler on 5/21/2023 at 10:36 PM

## 2023-05-22 NOTE — CONSULTS
fibrinogen replacement; these individuals are managed with anticoagulation. Interval history 5/22/23:  The patient is resting in bed, her  is at the bedside. Patient's daughter, Rafia Mar, on phone during evaluation. Patient reports she is feeling better. She states sudden onset of lightheadedness, diaphoresis and flushing prior to syncopal event. She had similar event occur in October 2022 and had work-up per Dr. Ashlyn Henriquez including stress test (10/21/22 negative), echo (completed on 10/21/22 EF 60%, LVF normal). She is tolerating Lovenox injections well. Denies any abnormal bleeding; no epistaxis, hemoptysis, hematemesis, melena, hematochezia, hematuria or vaginal bleeding. Denies oozing from her surgical site.        Meds    Current Medications    enoxaparin  1 mg/kg SubCUTAneous BID    iron sucrose  300 mg IntraVENous Q24H    docusate sodium  100 mg Oral BID    senna  1 tablet Oral Nightly    sodium chloride flush  5-40 mL IntraVENous 2 times per day    rosuvastatin  10 mg Oral Nightly    sertraline  100 mg Oral Nightly     bisacodyl, sodium chloride flush, sodium chloride, ondansetron **OR** ondansetron, polyethylene glycol, acetaminophen **OR** acetaminophen, HYDROcodone 5 mg - acetaminophen  IV Drips/Infusions   sodium chloride 50 mL/hr at 05/22/23 4467    sodium chloride       Past Medical History         Diagnosis Date    BRCA1 negative 2019    negative per pt    BRCA2 negative 2019    neg per pt    Depression     Glucose intolerance (impaired glucose tolerance)     Heart palpitations     HX OF:    History of cold sores     History of fatigue     Hyperlipidemia     Obesity, mild     Vitamin D deficiency       Past Surgical History           Procedure Laterality Date    APPENDECTOMY      COLONOSCOPY      IN BIOPSY BREAST OPEN INCISIONAL Right 1978    IN COLON CA SCRN NOT  [address] IND N/A 1/8/2018    COLONOSCOPY performed by Moshe Gonzalez MD at [address]

## 2023-05-23 VITALS
HEIGHT: 66 IN | BODY MASS INDEX: 45.01 KG/M2 | RESPIRATION RATE: 18 BRPM | DIASTOLIC BLOOD PRESSURE: 65 MMHG | SYSTOLIC BLOOD PRESSURE: 137 MMHG | WEIGHT: 280.1 LBS | HEART RATE: 91 BPM | TEMPERATURE: 97.9 F | OXYGEN SATURATION: 98 %

## 2023-05-23 LAB
BASOPHILS ABSOLUTE: 0 THOU/MM3 (ref 0–0.1)
BASOPHILS NFR BLD AUTO: 0.3 %
DEPRECATED RDW RBC AUTO: 48.7 FL (ref 35–45)
EOSINOPHIL NFR BLD AUTO: 1.7 %
EOSINOPHILS ABSOLUTE: 0.1 THOU/MM3 (ref 0–0.4)
ERYTHROCYTE [DISTWIDTH] IN BLOOD BY AUTOMATED COUNT: 13.1 % (ref 11.5–14.5)
HCT VFR BLD AUTO: 29.9 % (ref 37–47)
HGB BLD-MCNC: 9.2 GM/DL (ref 12–16)
IMM GRANULOCYTES # BLD AUTO: 0.05 THOU/MM3 (ref 0–0.07)
IMM GRANULOCYTES NFR BLD AUTO: 0.7 %
LYMPHOCYTES ABSOLUTE: 1.4 THOU/MM3 (ref 1–4.8)
LYMPHOCYTES NFR BLD AUTO: 20.7 %
MCH RBC QN AUTO: 31.2 PG (ref 26–33)
MCHC RBC AUTO-ENTMCNC: 30.8 GM/DL (ref 32.2–35.5)
MCV RBC AUTO: 101.4 FL (ref 81–99)
MONOCYTES ABSOLUTE: 0.7 THOU/MM3 (ref 0.4–1.3)
MONOCYTES NFR BLD AUTO: 9.7 %
NEUTROPHILS NFR BLD AUTO: 66.9 %
NRBC BLD AUTO-RTO: 0 /100 WBC
PLATELET # BLD AUTO: 210 THOU/MM3 (ref 130–400)
PMV BLD AUTO: 9.8 FL (ref 9.4–12.4)
RBC # BLD AUTO: 2.95 MILL/MM3 (ref 4.2–5.4)
SEGMENTED NEUTROPHILS ABSOLUTE COUNT: 4.6 THOU/MM3 (ref 1.8–7.7)
WBC # BLD AUTO: 6.9 THOU/MM3 (ref 4.8–10.8)

## 2023-05-23 PROCEDURE — 99232 SBSQ HOSP IP/OBS MODERATE 35: CPT | Performed by: PHYSICIAN ASSISTANT

## 2023-05-23 PROCEDURE — 6370000000 HC RX 637 (ALT 250 FOR IP): Performed by: NURSE PRACTITIONER

## 2023-05-23 PROCEDURE — 97110 THERAPEUTIC EXERCISES: CPT

## 2023-05-23 PROCEDURE — 97116 GAIT TRAINING THERAPY: CPT

## 2023-05-23 PROCEDURE — 85025 COMPLETE CBC W/AUTO DIFF WBC: CPT

## 2023-05-23 PROCEDURE — 36415 COLL VENOUS BLD VENIPUNCTURE: CPT

## 2023-05-23 PROCEDURE — 2580000003 HC RX 258: Performed by: PHYSICIAN ASSISTANT

## 2023-05-23 PROCEDURE — 6360000002 HC RX W HCPCS: Performed by: STUDENT IN AN ORGANIZED HEALTH CARE EDUCATION/TRAINING PROGRAM

## 2023-05-23 PROCEDURE — 6370000000 HC RX 637 (ALT 250 FOR IP): Performed by: PHYSICIAN ASSISTANT

## 2023-05-23 PROCEDURE — 97530 THERAPEUTIC ACTIVITIES: CPT

## 2023-05-23 RX ORDER — FERROUS SULFATE 325(65) MG
325 TABLET ORAL
Qty: 60 TABLET | Refills: 0 | Status: SHIPPED | OUTPATIENT
Start: 2023-05-23

## 2023-05-23 RX ORDER — PSEUDOEPHEDRINE HCL 30 MG
100 TABLET ORAL 2 TIMES DAILY PRN
Qty: 60 CAPSULE | Refills: 0 | Status: SHIPPED | OUTPATIENT
Start: 2023-05-23

## 2023-05-23 RX ORDER — ENOXAPARIN SODIUM 150 MG/ML
1 INJECTION SUBCUTANEOUS 2 TIMES DAILY
Qty: 52.2 ML | Refills: 0 | Status: SHIPPED | OUTPATIENT
Start: 2023-05-23 | End: 2023-06-22

## 2023-05-23 RX ADMIN — SODIUM CHLORIDE, PRESERVATIVE FREE 10 ML: 5 INJECTION INTRAVENOUS at 08:11

## 2023-05-23 RX ADMIN — HYDROCODONE BITARTRATE AND ACETAMINOPHEN 1 TABLET: 5; 325 TABLET ORAL at 10:58

## 2023-05-23 RX ADMIN — ACETAMINOPHEN 650 MG: 325 TABLET ORAL at 08:10

## 2023-05-23 RX ADMIN — ENOXAPARIN SODIUM 120 MG: 150 INJECTION SUBCUTANEOUS at 08:13

## 2023-05-23 RX ADMIN — DOCUSATE SODIUM 100 MG: 100 CAPSULE, LIQUID FILLED ORAL at 08:10

## 2023-05-23 RX ADMIN — ACETAMINOPHEN 650 MG: 325 TABLET ORAL at 13:51

## 2023-05-23 ASSESSMENT — PAIN DESCRIPTION - PAIN TYPE: TYPE: SURGICAL PAIN

## 2023-05-23 ASSESSMENT — PAIN DESCRIPTION - ORIENTATION
ORIENTATION: RIGHT

## 2023-05-23 ASSESSMENT — PAIN SCALES - GENERAL
PAINLEVEL_OUTOF10: 3
PAINLEVEL_OUTOF10: 4
PAINLEVEL_OUTOF10: 4

## 2023-05-23 ASSESSMENT — PAIN - FUNCTIONAL ASSESSMENT
PAIN_FUNCTIONAL_ASSESSMENT: ACTIVITIES ARE NOT PREVENTED

## 2023-05-23 ASSESSMENT — PAIN DESCRIPTION - DESCRIPTORS
DESCRIPTORS: DULL
DESCRIPTORS: DULL
DESCRIPTORS: ACHING

## 2023-05-23 ASSESSMENT — PAIN DESCRIPTION - LOCATION
LOCATION: KNEE

## 2023-05-23 ASSESSMENT — PAIN DESCRIPTION - FREQUENCY: FREQUENCY: INTERMITTENT

## 2023-05-23 NOTE — DISCHARGE SUMMARY
Hospitalist Discharge Summary    Patient: Dina Dover  YOB: 1955  MRN: [id number]   Acct: [de-identified]    Primary Care Physician: Néstor Eugene MD    Admit date  5/20/2023    Discharge date: 5/23/2023     Discharge Assessment and Plan:    Syncope and collapse:   - Given prodromal symptoms and history, suspect likely secondary to vasovagal.   Given very small size of pulmonary embolus, do not feel this is likely the etiology of the patient's syncope. - Notably, the patient did undergo a cardiac work-up previously for syncope in October 2022 which was unremarkable. Had 14 day monitor at that time which was unremarkable. - Orthostats negative on arrival, although borderline.  supine, 111 standing. The patient also received IVF bolus prior to orthos so the yield is low. - Family was concerned about \"shaking movements\" and seizure. Neurology was consulted, concern for seizure low. Neurology signed off. - Carotid Dopplers negative for significant stenosis. - Tilt table test was ordered but patient unable to tolerate at this time. - Echo unremarkable. - Patient educated on syncope. Follow up with Dr. Jean Carlos Mary in 2-4 weeks. Pulmonary embolism:   Provoked due to recent arthroplasty. CTA notable for small anterior segmental lower lobe PE. No evidence of right heart strain. Patient is hemodynamically stable, is not tachycardic, and is on room air. Continue therapeutic Lovenox per Heme-onc. Given history of dysfibrinogenemia, duration of therapy will leave up to hematology. Keep Fibrinogen > 50. Patient is scheduled to follow up locally on Tuesday and a referral was sent to Hematology in [place]. Recent right TKA: With Dr. Charles Prasad 5/18/23. On [place] for pain control. Ambulate. Follow up as scheduled. 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 Hematology following. Fibrinogen stable. Follow up scheduled next week.       Iron deficiency anemia: Hemoglobin has been

## 2023-05-23 NOTE — PROGRESS NOTES
Completed Echo, Dr. Bowen to read.
Educated on discharge instructions, medications, and follow up appointments. No further questions or concerns voiced. Discharged to home with daughter.
[facility]  INPATIENT PHYSICAL THERAPY  DAILY [facility]  [facility]025[facility]    Time In:   Time Out: 917  Timed Code Treatment Minutes: 37 Minutes  Minutes: 43          Date: 2023  Patient Name: Rex Givens,  Gender:  female        MRN: [id number]  : 1955  (79 y.o.)     Referring Practitioner: DANIEL Michaels  Diagnosis: syncope and collapse  Additional Pertinent Hx: admit with above diagnosis. per oncology- Pt has a known history of \"inherited dysfibrinogenemia\" per [facility] note on 2020 with recommendations for any planned surgeries to be provided on as needed basis. I do not see that she has followed up with [facility] since .   She had planned total knee arthroplasty on 2023 and developed PE as seen on CTA Chest 2023 (+) small filling defect seen in segmental branch to anterior segment of RLL consistent with acute pulmonary embolism     Prior Level of Function:  Lives With: Spouse  Type of Home: House  Home Layout: Two level  Home Access: Stairs to enter with rails  Entrance Stairs - Number of Steps: 4  Home Equipment: Walker, rolling        Ambulation Assistance: Independent  Transfer Assistance: Independent    Restrictions/Precautions:  Restrictions/Precautions: Fall Risk, Weight Bearing  Right Lower Extremity Weight Bearing: Weight Bearing As Tolerated     SUBJECTIVE: pt agreeable and motivated for PT but once sitting EOB c/o feeling hot and then nauseated limiting WB activity, see vitals, RN aware    PAIN: 3/10: right knee    Vitals:   BP MAP HR  Sup   140/72 98 89  Sit   131/64 90 87  Std  138/70 95 111    OBJECTIVE:  Bed Mobility:  Rolling to Left: Stand By Assistance   Supine to Sit: Stand By Assistance  Sit to sup with Anneliese at RLE  Scooting: Stand By Assistance  HOB up 30 degrees, pt using UE to assist with RLE   Transfers:  Sit to Stand: Contact Guard Assistance  Stand to Sit:Contact Guard Assistance    Ambulation:  Not tested      Balance:  Static
[facility]    Patient - Leonard Cha   MRN -  [id number]   Acct # - [de-identified]   - 1955      Date of Admission -  2023 12:17 PM  Date of evaluation -  2023  Room - --A   Hospital Day - [address] [facility] PAYosi Primary Care Physician - Razia Green MD       Reason for Consult    Anticoagulation recommendations, low fibrinogen, PE   Active Hospital Problem List      Active Hospital Problems    Diagnosis Date Noted    Acute pulmonary embolism without acute cor pulmonale, unspecified pulmonary embolism type ([facility] [address] [I26.99] 2023    Syncope and collapse [R55] 2023    Pulmonary embolism without acute cor pulmonale, unspecified chronicity, unspecified pulmonary embolism type [facility]) [I26.99] 2023     HPI/Subjective   Leonard Cha is a 79 y.o. female admitted following syncopal episode. The patient presented to the ED on 23 following syncopal episode at home. She underwent total knee arthroplasty on 23 at [facility] per Dr. Royce Infante and was discharged on 23. Prior to ED evaluation, the patient stood to go to bathroom and experienced lightheadedness. She developed syncope and was brought to the ED for further evaluation. Syncopal event was witnessed by her daughter who is RN. In the ED, CTA of chest completed which revealed small filling defect in seen in the segmental branch to the anterior segment of the right lower lobe consistent with acute pulmonary embolus. Nonspecific mild distal esophageal wall thickening. She was started on IV heparin infusion. The patient has history of dysfibrinogenemia and Heme/Onc consulted. As per LEI Flores on 23: We saw the patient in our office on 10/1/2019 with recommendations for genetic testing, then follow up in our office for recommendations. She saw genetic counselor on 2019 and 2019 for testing but did not follow up in our office.   Pt has a known
See ACP note
training, Home exercise program, Patient/Caregiver education & training, Safety education & training, Therapeutic activities  General Plan:  (5X O/GM)    Patient Education  Patient Education: Plan of Care, Family Education, [employer] Mobility, Equipment Education, Transfers, Gait, Stairs, Use of [employer], Verbal Exercise Instruction,  - Patient Verbalized Understanding, - Patient Requires Continued Education    Goals:  Patient Goals : go home  Short Term Goals  Time Frame for Short Term Goals: by discharge  Short Term Goal 1: bed mobility with MOD I to get in/out of bed  Short Term Goal 2: transfer with MOD I to get in/out of chairs  Short Term Goal 3: amb >75'x1 with walker and S to walk safely in home  Short Term Goal 4: negotiate 4 steps with RHR and SBA to enter home safely  Long Term Goals  Time Frame for Long Term Goals : no LTGs set secondary to short ELOS    Following session, patient left in safe position with all fall risk precautions in place. Pt in bedside chair following session, all needs and call light in reach, alarm on.
based dosing when appropriate to reduce the radiation dose to as low as reasonably achievable. CONTRAST: 80  cc of Isovue-370  intravenously FINDINGS: There is adequate opacification of the main pulmonary artery and its branches. . A filling defect is seen in the segmental branch to the anterior segment of the right lower lobe. . No focal pulmonary consolidation. No large pulmonary mass. Central airway is patent. No pleural effusions. No significant pericardial effusion. The heart is not enlarged. Ascending thoracic aorta is not dilated. The main pulmonary artery is not dilated. Mild distal esophageal wall thickening. No significantly enlarged mediastinal or  axillary lymph node. The thyroid is not enlarged. No chest wall mass. Limited evaluation below the diaphragm is unremarkable. Bones: Degenerative changes of the thoracic spine. The bones are demineralized. .     1. A small filling defect is seen in the segmental branch to the anterior segment of the right lower lobe consistent with a acute pulmonary embolus. 2. Nonspecific mild distal esophageal wall thickening. **This report has been created using voice recognition software. It may contain minor errors which are inherent in voice recognition technology. ** Final report electronically signed by Dr Cecille Cintron on 5/20/2023 2:04 PM            Active Hospital Problems    Diagnosis Date Noted    Acute pulmonary embolism without acute cor pulmonale, unspecified pulmonary embolism type ([address] [I26.99] 05/22/2023    Syncope and collapse [R55] 05/22/2023    Pulmonary embolism without acute cor pulmonale, unspecified chronicity, unspecified pulmonary embolism type ([address] [I26.99] 05/20/2023       Electronically signed by Dhiraj Hurt PA-C on 5/22/2023 at 5:35 PM

## 2023-05-23 NOTE — CARE COORDINATION
5/23/23, 1:08 PM EDT    Patient goals/plan/ treatment preferences discussed by  and . Patient goals/plan/ treatment preferences reviewed with patient/ family. Patient/ family verbalize understanding of discharge plan and are in agreement with goal/plan/treatment preferences. Understanding was demonstrated using the teach back method. AVS provided by RN at time of discharge, which includes all necessary medical information pertaining to the patients current course of illness, treatment, post-discharge goals of care, and treatment preferences. Services At/After Discharge: None       IMM Letter  IMM Letter given to Patient/Family/Significant other/Guardian/POA/by[de-identified] Christelle PILLAI Case Manager  IMM Letter date given[de-identified] 05/22/23  IMM Letter time given[de-identified] 1440  Observation Status Letter date given[de-identified] 05/20/23  Observation Status Letter time given[de-identified] 0  Observation Status Letter given to Patient/Family/Significant other/Guardian/POA/by[de-identified] Staff     Discharge order in place. Plans return home with spouse as prior to admission. Pt to continue with her OP PT/OT as ordered post operatively.

## 2023-05-24 ENCOUNTER — CARE COORDINATION (OUTPATIENT)
Dept: CASE MANAGEMENT | Age: 68
End: 2023-05-24

## 2023-05-24 DIAGNOSIS — I26.99 ACUTE PULMONARY EMBOLISM WITHOUT ACUTE COR PULMONALE, UNSPECIFIED PULMONARY EMBOLISM TYPE (HCC): Primary | ICD-10-CM

## 2023-05-24 PROCEDURE — 1111F DSCHRG MED/CURRENT MED MERGE: CPT | Performed by: FAMILY MEDICINE

## 2023-05-24 NOTE — CARE COORDINATION
[facility] Care Transitions Initial Follow Up Call    Call within 2 business days of discharge: Yes    Patient Current Location:  Home: [place] [address]    Care Transition Nurse contacted the patient by telephone to perform post hospital discharge assessment. Verified name and  with patient as identifiers. Provided introduction to self, and explanation of the Care Transition Nurse role. Patient: Toi Luis Patient : 1955   MRN: [id number]  Reason for Admission: Acute PE  Discharge Date: 23 RARS: Readmission Risk Score: 13.7      Last Discharge [address]       Date Complaint Diagnosis Description Type Department Provider    23 Dizziness; Loss of Consciousness Acute pulmonary embolism without acute cor pulmonale, unspecified pulmonary embolism type ([facility] [address] . .. ED to Hosp-Admission (Discharged) (ADMITTED) TACOS Meneses PA-C; Roger Hinton, . .. Challenges to be reviewed by the provider   Additional needs identified to be addressed with provider: No  none               Method of communication with provider: none. Spoke with Orly Cary, said she is doing ok. Has some strange feelings in her knee (had knee replacement ), tingling like the nerve endings regenerating. Her knee is swollen, has been using ice but feels that aggravates it more. Suggested to try some moist heat also. Reviewed medications/changes. Has no problems with Lovenox injections. Her bowels are moving but was a little constipated. Is taking Colace, suggested to get some Miralax also to use as a laxative. Explained that the iron she is now taking could cause constipation and darker stools. Denies any dizziness or breathing issues. Uses a walker for ambulation. Discussed not making any sudden moves with her head and to get her bearings before ambulating. Said her  and dtr are a little paranoid about her dizziness but she hasn't had any episodes so far.   She will have an evaluation at

## 2023-05-26 DIAGNOSIS — I26.99 PULMONARY EMBOLISM WITHOUT ACUTE COR PULMONALE, UNSPECIFIED CHRONICITY, UNSPECIFIED PULMONARY EMBOLISM TYPE (HCC): ICD-10-CM

## 2023-05-30 ENCOUNTER — OFFICE VISIT (OUTPATIENT)
Dept: ONCOLOGY | Age: 68
End: 2023-05-30
Payer: MEDICARE

## 2023-05-30 ENCOUNTER — HOSPITAL ENCOUNTER (OUTPATIENT)
Dept: INFUSION THERAPY | Age: 68
Discharge: HOME | End: 2023-05-30
Payer: MEDICARE

## 2023-05-30 VITALS
OXYGEN SATURATION: 97 % | SYSTOLIC BLOOD PRESSURE: 120 MMHG | DIASTOLIC BLOOD PRESSURE: 58 MMHG | TEMPERATURE: 97.9 F | HEART RATE: 85 BPM

## 2023-05-30 VITALS
TEMPERATURE: 97.9 F | BODY MASS INDEX: 43.55 KG/M2 | WEIGHT: 271 LBS | OXYGEN SATURATION: 97 % | SYSTOLIC BLOOD PRESSURE: 120 MMHG | DIASTOLIC BLOOD PRESSURE: 58 MMHG | HEART RATE: 85 BPM | HEIGHT: 66 IN

## 2023-05-30 DIAGNOSIS — D68.2 DYSFIBRINOGENEMIA (HCC): Primary | ICD-10-CM

## 2023-05-30 DIAGNOSIS — I26.99 PULMONARY EMBOLISM WITHOUT ACUTE COR PULMONALE, UNSPECIFIED CHRONICITY, UNSPECIFIED PULMONARY EMBOLISM TYPE (HCC): ICD-10-CM

## 2023-05-30 DIAGNOSIS — D53.9 MACROCYTIC ANEMIA: ICD-10-CM

## 2023-05-30 LAB
ABSOLUTE IMMATURE GRANULOCYTE: 0.12 THOU/MM3 (ref 0–0.07)
BASOPHILS ABSOLUTE: 0 THOU/MM3 (ref 0–0.1)
BASOPHILS NFR BLD AUTO: 0 % (ref 0–3)
EOSINOPHIL NFR BLD AUTO: 1 % (ref 0–4)
EOSINOPHILS ABSOLUTE: 0.1 THOU/MM3 (ref 0–0.4)
ERYTHROCYTE [DISTWIDTH] IN BLOOD BY AUTOMATED COUNT: 14.2 % (ref 11.5–14.5)
HCT VFR BLD AUTO: 30.2 % (ref 37–47)
HGB BLD-MCNC: 9.4 GM/DL (ref 12–16)
IMMATURE GRANULOCYTES: 1 %
LYMPHOCYTES ABSOLUTE: 1.5 THOU/MM3 (ref 1–4.8)
LYMPHOCYTES NFR BLD AUTO: 16 % (ref 15–47)
MCH RBC QN AUTO: 31 PG (ref 26–33)
MCHC RBC AUTO-ENTMCNC: 31.1 GM/DL (ref 32.2–35.5)
MCV RBC AUTO: 100 FL (ref 81–99)
MONOCYTES ABSOLUTE: 0.5 THOU/MM3 (ref 0.4–1.3)
MONOCYTES NFR BLD AUTO: 6 % (ref 0–12)
NEUTROPHILS NFR BLD AUTO: 76 % (ref 43–75)
PLATELET # BLD AUTO: 243 THOU/MM3 (ref 130–400)
PMV BLD AUTO: 9.4 FL (ref 9.4–12.4)
RBC # BLD AUTO: 3.03 MILL/MM3 (ref 4.2–5.4)
SEGMENTED NEUTROPHILS ABSOLUTE COUNT: 6.9 THOU/MM3 (ref 1.8–7.7)
WBC # BLD AUTO: 9.1 THOU/MM3 (ref 4.8–10.8)

## 2023-05-30 PROCEDURE — G8427 DOCREV CUR MEDS BY ELIG CLIN: HCPCS | Performed by: PHYSICIAN ASSISTANT

## 2023-05-30 PROCEDURE — 1123F ACP DISCUSS/DSCN MKR DOCD: CPT | Performed by: PHYSICIAN ASSISTANT

## 2023-05-30 PROCEDURE — G8400 PT W/DXA NO RESULTS DOC: HCPCS | Performed by: PHYSICIAN ASSISTANT

## 2023-05-30 PROCEDURE — 1036F TOBACCO NON-USER: CPT | Performed by: PHYSICIAN ASSISTANT

## 2023-05-30 PROCEDURE — 99214 OFFICE O/P EST MOD 30 MIN: CPT | Performed by: PHYSICIAN ASSISTANT

## 2023-05-30 PROCEDURE — 85025 COMPLETE CBC W/AUTO DIFF WBC: CPT

## 2023-05-30 PROCEDURE — 1111F DSCHRG MED/CURRENT MED MERGE: CPT | Performed by: PHYSICIAN ASSISTANT

## 2023-05-30 PROCEDURE — 1090F PRES/ABSN URINE INCON ASSESS: CPT | Performed by: PHYSICIAN ASSISTANT

## 2023-05-30 PROCEDURE — 3017F COLORECTAL CA SCREEN DOC REV: CPT | Performed by: PHYSICIAN ASSISTANT

## 2023-05-30 PROCEDURE — 99211 OFF/OP EST MAY X REQ PHY/QHP: CPT

## 2023-05-30 PROCEDURE — 36415 COLL VENOUS BLD VENIPUNCTURE: CPT

## 2023-05-30 PROCEDURE — G8417 CALC BMI ABV UP PARAM F/U: HCPCS | Performed by: PHYSICIAN ASSISTANT

## 2023-05-30 NOTE — PROGRESS NOTES
thickening noted on CTA of chest. She was recommended for follow up with GI. She is established with Dr. Mayela Lipscomb. On PPI, MVI. -Due to constipation, instructed patient to decrease oral iron to every other day. Discussed need to avoid straining/vagal response with recent syncopal episode.    -Will continue to monitor Hgb/hct. -Return to clinic in 4 weeks       Return in about 4 weeks (around 6/27/2023). All patient questions answered. Pt voiced understanding. Patient agreed with treatment plan. Follow up as directed. Patient instructed to call for questions or concerns. On this date 5/30/2023 I have spent 35 minutes reviewing previous notes, test results and face to face with the patient discussing the diagnosis and importance of compliance with the treatment plan as well as documenting on the day of the visit.     Electronically signed by   Chiquita Duran PA-C

## 2023-05-31 ENCOUNTER — CARE COORDINATION (OUTPATIENT)
Dept: CASE MANAGEMENT | Age: 68
End: 2023-05-31

## 2023-05-31 NOTE — CARE COORDINATION
[facility] Care Transitions Follow Up Call    Patient Current Location:  Home: [address]    Care Transition Nurse contacted the patient by telephone to follow up after admission on 23. Verified name and  with patient as identifiers. Patient: Anna Irvin  Patient : 1955   MRN: [id number]  Reason for Admission: Acute PE  Discharge Date: 23 RARS: Readmission Risk Score: 13.7      Needs to be reviewed by the provider   Additional needs identified to be addressed with provider: No  none             Method of communication with provider: none. Spoke with Hiral Metz, said she is doing much better. Denies chest pain, SOB/DUNN, dizziness. Her knee is still swollen but has been going to outpt therapy and said it is normal.  Saw oncology yesterday and her iron levels are a little high so is now taking iron every other day which will help with her bowels. Bowels are moving ok but can get constipated. Said she had wonderful care while in the hospital.  Will see surgeon and cardio next week. Denies any other needs. No other questions or concerns at this time. Will continue to follow. Addressed changes since last contact:  none  Discussed follow-up appointments. If no appointment was previously scheduled, appointment scheduling offered: Yes. Is follow up appointment scheduled within 7 days of discharge? Yes but cancelled    Follow Up  Future Appointments   Date Time Provider Margaret Hayes   2023  1:30 PM Azul Da Silva [facility] Heart [facility]   2023  8:20 AM Jakub Barroso [facility] On[facility]   2024  1:00 PM Jennifer Shafer MD [address] [place] [address]-[facility] follow up appointment(s): Dr Cruz Pacheco  [facility] Transition Nurse reviewed medical action plan and red flags with patient and discussed any barriers to care and/or understanding of plan of care after discharge.  Discussed appropriate site of care based on symptoms and resources

## 2023-06-01 ENCOUNTER — CARE COORDINATION (OUTPATIENT)
Dept: CARE COORDINATION | Age: 68
End: 2023-06-01

## 2023-06-01 NOTE — CARE COORDINATION
SW mailed out ACP documents to pt and will follow up to make sure she recieves and assist with completing.

## 2023-06-07 ENCOUNTER — OFFICE VISIT (OUTPATIENT)
Dept: CARDIOLOGY CLINIC | Age: 68
End: 2023-06-07
Payer: MEDICARE

## 2023-06-07 VITALS
SYSTOLIC BLOOD PRESSURE: 138 MMHG | DIASTOLIC BLOOD PRESSURE: 78 MMHG | BODY MASS INDEX: 41.84 KG/M2 | WEIGHT: 266.6 LBS | HEIGHT: 67 IN | HEART RATE: 100 BPM

## 2023-06-07 DIAGNOSIS — R00.2 HEART PALPITATIONS: ICD-10-CM

## 2023-06-07 DIAGNOSIS — R55 SYNCOPE AND COLLAPSE: Primary | ICD-10-CM

## 2023-06-07 PROCEDURE — G8427 DOCREV CUR MEDS BY ELIG CLIN: HCPCS | Performed by: STUDENT IN AN ORGANIZED HEALTH CARE EDUCATION/TRAINING PROGRAM

## 2023-06-07 PROCEDURE — 3017F COLORECTAL CA SCREEN DOC REV: CPT | Performed by: STUDENT IN AN ORGANIZED HEALTH CARE EDUCATION/TRAINING PROGRAM

## 2023-06-07 PROCEDURE — G8417 CALC BMI ABV UP PARAM F/U: HCPCS | Performed by: STUDENT IN AN ORGANIZED HEALTH CARE EDUCATION/TRAINING PROGRAM

## 2023-06-07 PROCEDURE — 1036F TOBACCO NON-USER: CPT | Performed by: STUDENT IN AN ORGANIZED HEALTH CARE EDUCATION/TRAINING PROGRAM

## 2023-06-07 PROCEDURE — 1090F PRES/ABSN URINE INCON ASSESS: CPT | Performed by: STUDENT IN AN ORGANIZED HEALTH CARE EDUCATION/TRAINING PROGRAM

## 2023-06-07 PROCEDURE — 99214 OFFICE O/P EST MOD 30 MIN: CPT | Performed by: STUDENT IN AN ORGANIZED HEALTH CARE EDUCATION/TRAINING PROGRAM

## 2023-06-07 PROCEDURE — 1111F DSCHRG MED/CURRENT MED MERGE: CPT | Performed by: STUDENT IN AN ORGANIZED HEALTH CARE EDUCATION/TRAINING PROGRAM

## 2023-06-07 PROCEDURE — G8400 PT W/DXA NO RESULTS DOC: HCPCS | Performed by: STUDENT IN AN ORGANIZED HEALTH CARE EDUCATION/TRAINING PROGRAM

## 2023-06-07 PROCEDURE — 1123F ACP DISCUSS/DSCN MKR DOCD: CPT | Performed by: STUDENT IN AN ORGANIZED HEALTH CARE EDUCATION/TRAINING PROGRAM

## 2023-06-07 NOTE — PROGRESS NOTES
Pt here for a hospital f/u    EKG done 5-20-23    Pt c/o sob on exertion
deformities    EKG:          Diagnosis Orders   1. Syncope and collapse        2. Heart palpitations            No orders of the defined types were placed in this encounter. Assessment/Plan:   Syncope/collapse- considering tilt test. Wait until rehab for TKA is complete if patietn not having any significant symptoms. Not having any recent heart palpitations. No changes at this time. Continue rehab. Will see back once complete for consideration of further testing for syncope. Disposition:   3 months.    Follow up with Dr Milli Marte as scheduled or sooner if needed

## 2023-06-08 ENCOUNTER — CARE COORDINATION (OUTPATIENT)
Dept: CASE MANAGEMENT | Age: 68
End: 2023-06-08

## 2023-06-08 RX ORDER — PANTOPRAZOLE SODIUM 20 MG/1
TABLET, DELAYED RELEASE ORAL
Qty: 28 TABLET | OUTPATIENT
Start: 2023-06-08

## 2023-06-08 NOTE — CARE COORDINATION
Care Transitions Initial Outreach Attempt-1st attempt    Call within 2 business days of discharge: Yes   Attempted to reach patient for subsequent transitional call. Left HIPPA compliant VM to return call directly to [phone]. Unable to reach letter sent via [address] Patient: Jacob Crenshaw Patient : 1955 MRN: [id number]    Last Discharge [address]       Date Complaint Diagnosis Description Type Department Provider    23 Dizziness; Loss of Consciousness Acute pulmonary embolism without acute cor pulmonale, unspecified pulmonary embolism type ([address] . .. ED to Hosp-Admission (Discharged) (ADMITTED) TACOS Jameson PA-C; Keely Monge, . ..               Noted following upcoming appointments from discharge chart review:   Keli Barnett Dr follow up appointment(s):   Future Appointments   Date Time Provider Margaret Hayes   2023  8:20 AM Jasmin Coronado [facility] Oncology [facility]   2023  1:30 PM [address], MD [address] [place] [address]   2024  1:00 PM Mei David  [facility]     Non-[facility] follow up appointment(s): yonas

## 2023-06-19 ENCOUNTER — CARE COORDINATION (OUTPATIENT)
Dept: CARE COORDINATION | Age: 68
End: 2023-06-19

## 2023-06-19 NOTE — CARE COORDINATION
SW called pt to follow up with ACP documents. Pt stated she did receive however she is on vacation and has not really looked at them. Advised pt to call when she needs assistance completing.

## 2023-06-27 ENCOUNTER — OFFICE VISIT (OUTPATIENT)
Dept: ONCOLOGY | Age: 68
End: 2023-06-27
Payer: MEDICARE

## 2023-06-27 ENCOUNTER — HOSPITAL ENCOUNTER (OUTPATIENT)
Dept: INFUSION THERAPY | Age: 68
Discharge: HOME | End: 2023-06-27
Payer: MEDICARE

## 2023-06-27 ENCOUNTER — TELEPHONE (OUTPATIENT)
Dept: INFUSION THERAPY | Age: 68
End: 2023-06-27

## 2023-06-27 VITALS
HEART RATE: 93 BPM | BODY MASS INDEX: 41.59 KG/M2 | WEIGHT: 265 LBS | SYSTOLIC BLOOD PRESSURE: 128 MMHG | DIASTOLIC BLOOD PRESSURE: 80 MMHG | HEIGHT: 67 IN | TEMPERATURE: 98 F | OXYGEN SATURATION: 97 % | RESPIRATION RATE: 16 BRPM

## 2023-06-27 VITALS
TEMPERATURE: 98 F | DIASTOLIC BLOOD PRESSURE: 80 MMHG | RESPIRATION RATE: 16 BRPM | SYSTOLIC BLOOD PRESSURE: 128 MMHG | HEART RATE: 93 BPM

## 2023-06-27 DIAGNOSIS — D53.9 MACROCYTIC ANEMIA: ICD-10-CM

## 2023-06-27 DIAGNOSIS — D68.2 DYSFIBRINOGENEMIA (HCC): Primary | ICD-10-CM

## 2023-06-27 DIAGNOSIS — I26.99 PULMONARY EMBOLISM WITHOUT ACUTE COR PULMONALE, UNSPECIFIED CHRONICITY, UNSPECIFIED PULMONARY EMBOLISM TYPE (HCC): ICD-10-CM

## 2023-06-27 LAB
ABSOLUTE IMMATURE GRANULOCYTE: 0.02 THOU/MM3 (ref 0–0.07)
BASOPHILS ABSOLUTE: 0 THOU/MM3 (ref 0–0.1)
BASOPHILS NFR BLD AUTO: 1 % (ref 0–3)
EOSINOPHIL NFR BLD AUTO: 2 % (ref 0–4)
EOSINOPHILS ABSOLUTE: 0.1 THOU/MM3 (ref 0–0.4)
ERYTHROCYTE [DISTWIDTH] IN BLOOD BY AUTOMATED COUNT: 12.8 % (ref 11.5–14.5)
FERRITIN SERPL IA-MCNC: 336 NG/ML (ref 10–291)
FOLATE SERPL-MCNC: 10.2 NG/ML (ref 4.8–24.2)
HCT VFR BLD AUTO: 40.1 % (ref 37–47)
HGB BLD-MCNC: 12.7 GM/DL (ref 12–16)
IMMATURE GRANULOCYTES: 1 %
IRON SERPL-MCNC: 65 UG/DL (ref 50–170)
LYMPHOCYTES ABSOLUTE: 1.2 THOU/MM3 (ref 1–4.8)
LYMPHOCYTES NFR BLD AUTO: 30 % (ref 15–47)
MCH RBC QN AUTO: 30.8 PG (ref 26–33)
MCHC RBC AUTO-ENTMCNC: 31.7 GM/DL (ref 32.2–35.5)
MCV RBC AUTO: 97 FL (ref 81–99)
MONOCYTES ABSOLUTE: 0.4 THOU/MM3 (ref 0.4–1.3)
MONOCYTES NFR BLD AUTO: 10 % (ref 0–12)
NEUTROPHILS NFR BLD AUTO: 57 % (ref 43–75)
PLATELET # BLD AUTO: 252 THOU/MM3 (ref 130–400)
PMV BLD AUTO: 9.5 FL (ref 9.4–12.4)
RBC # BLD AUTO: 4.13 MILL/MM3 (ref 4.2–5.4)
SEGMENTED NEUTROPHILS ABSOLUTE COUNT: 2.2 THOU/MM3 (ref 1.8–7.7)
TIBC SERPL-MCNC: 272 UG/DL (ref 171–450)
VIT B12 SERPL-MCNC: 509 PG/ML (ref 211–911)
WBC # BLD AUTO: 3.9 THOU/MM3 (ref 4.8–10.8)

## 2023-06-27 PROCEDURE — 99211 OFF/OP EST MAY X REQ PHY/QHP: CPT

## 2023-06-27 PROCEDURE — G8427 DOCREV CUR MEDS BY ELIG CLIN: HCPCS | Performed by: PHYSICIAN ASSISTANT

## 2023-06-27 PROCEDURE — 83540 ASSAY OF IRON: CPT

## 2023-06-27 PROCEDURE — 1090F PRES/ABSN URINE INCON ASSESS: CPT | Performed by: PHYSICIAN ASSISTANT

## 2023-06-27 PROCEDURE — 99213 OFFICE O/P EST LOW 20 MIN: CPT | Performed by: PHYSICIAN ASSISTANT

## 2023-06-27 PROCEDURE — 82728 ASSAY OF FERRITIN: CPT

## 2023-06-27 PROCEDURE — 82746 ASSAY OF FOLIC ACID SERUM: CPT

## 2023-06-27 PROCEDURE — G8417 CALC BMI ABV UP PARAM F/U: HCPCS | Performed by: PHYSICIAN ASSISTANT

## 2023-06-27 PROCEDURE — 36415 COLL VENOUS BLD VENIPUNCTURE: CPT

## 2023-06-27 PROCEDURE — G8400 PT W/DXA NO RESULTS DOC: HCPCS | Performed by: PHYSICIAN ASSISTANT

## 2023-06-27 PROCEDURE — 3017F COLORECTAL CA SCREEN DOC REV: CPT | Performed by: PHYSICIAN ASSISTANT

## 2023-06-27 PROCEDURE — 82607 VITAMIN B-12: CPT

## 2023-06-27 PROCEDURE — 85025 COMPLETE CBC W/AUTO DIFF WBC: CPT

## 2023-06-27 PROCEDURE — 83550 IRON BINDING TEST: CPT

## 2023-06-27 PROCEDURE — 1036F TOBACCO NON-USER: CPT | Performed by: PHYSICIAN ASSISTANT

## 2023-06-27 PROCEDURE — 1123F ACP DISCUSS/DSCN MKR DOCD: CPT | Performed by: PHYSICIAN ASSISTANT

## 2023-06-27 RX ORDER — ENOXAPARIN SODIUM 150 MG/ML
1 INJECTION SUBCUTANEOUS 2 TIMES DAILY
Qty: 48 ML | Refills: 0 | Status: SHIPPED | OUTPATIENT
Start: 2023-06-27 | End: 2023-07-27

## 2023-06-27 RX ORDER — ENOXAPARIN SODIUM 150 MG/ML
1 INJECTION SUBCUTANEOUS 2 TIMES DAILY
Qty: 48 ML | Refills: 0 | Status: SHIPPED | OUTPATIENT
Start: 2023-06-27 | End: 2023-06-27 | Stop reason: SDUPTHER

## 2023-06-27 NOTE — TELEPHONE ENCOUNTER
I called the patient to inform her that i'm working on lowering her cost for Lovenox. I will keep her updated.

## 2023-07-19 ENCOUNTER — TELEPHONE (OUTPATIENT)
Dept: ONCOLOGY | Age: 68
End: 2023-07-19

## 2023-07-19 NOTE — TELEPHONE ENCOUNTER
Received call from patient that she has been on Lovenox since her knee surgery and her appointment with a Hematologist in [address] [place] [address] has gotten moved back to September 14th. She is wanting you to call her at some point to have a discussion about making a decision on the Lovenox. If you could please call her when you get a change- thanks!

## 2023-07-20 NOTE — TELEPHONE ENCOUNTER
Called and discussed with patient. She will see Hematology at  on 9/14/23. She has 7 days of Lovenox remaining. Will discuss with financial navigation for assistance on Lovenox coverage.

## 2023-07-24 ENCOUNTER — TELEPHONE (OUTPATIENT)
Dept: INFUSION THERAPY | Age: 68
End: 2023-07-24

## 2023-07-24 RX ORDER — ENOXAPARIN SODIUM 150 MG/ML
1 INJECTION SUBCUTANEOUS 2 TIMES DAILY
Qty: 48 ML | Refills: 0 | Status: SHIPPED | OUTPATIENT
Start: 2023-07-24 | End: 2023-08-23

## 2023-07-24 NOTE — TELEPHONE ENCOUNTER
I called the patient to remind her that she has the reusable discount card to use for Lovenox at [facility] aid and I requested that [employer] add a new script.

## 2023-07-25 NOTE — TELEPHONE ENCOUNTER
Called patient. Discussed recommendation to continue Lovenox until follow up at . Discussed Lovenox available at [facility] aid.

## 2023-07-27 ENCOUNTER — TELEPHONE (OUTPATIENT)
Dept: FAMILY MEDICINE CLINIC | Age: 68
End: 2023-07-27

## 2023-07-27 ENCOUNTER — HOSPITAL ENCOUNTER (OUTPATIENT)
Dept: WOMENS IMAGING | Age: 68
Discharge: HOME | End: 2023-07-27
Attending: RADIOLOGY
Payer: MEDICARE

## 2023-07-27 DIAGNOSIS — R92.2 BREAST DENSITY: ICD-10-CM

## 2023-07-27 DIAGNOSIS — Z09 FOLLOW-UP EXAM: ICD-10-CM

## 2023-07-27 PROCEDURE — G0279 TOMOSYNTHESIS, MAMMO: HCPCS

## 2023-07-27 NOTE — TELEPHONE ENCOUNTER
----- Message from Stuart Burroughs MD sent at 7/27/2023 11:30 AM EDT -----  Normal mammogram, continue yearly screenings.

## 2023-08-23 ENCOUNTER — TELEPHONE (OUTPATIENT)
Dept: ONCOLOGY | Age: 68
End: 2023-08-23

## 2023-08-23 NOTE — TELEPHONE ENCOUNTER
Patient called stating she has 5 days left of her lovenox, medication pended. Stated she wasn't sure if you needed to speak to her regarding the medication before her next appointment with you 9/19/23.  Please advise

## 2023-08-24 RX ORDER — ENOXAPARIN SODIUM 150 MG/ML
1 INJECTION SUBCUTANEOUS 2 TIMES DAILY
Qty: 48 ML | Refills: 0 | Status: SHIPPED | OUTPATIENT
Start: 2023-08-24 | End: 2023-09-23

## 2023-08-25 ENCOUNTER — TELEPHONE (OUTPATIENT)
Dept: ONCOLOGY | Age: 68
End: 2023-08-25

## 2023-09-11 ENCOUNTER — OFFICE VISIT (OUTPATIENT)
Dept: CARDIOLOGY CLINIC | Age: 68
End: 2023-09-11
Payer: MEDICARE

## 2023-09-11 VITALS
HEIGHT: 67 IN | WEIGHT: 257 LBS | DIASTOLIC BLOOD PRESSURE: 75 MMHG | SYSTOLIC BLOOD PRESSURE: 115 MMHG | HEART RATE: 82 BPM | BODY MASS INDEX: 40.34 KG/M2

## 2023-09-11 DIAGNOSIS — I10 PRIMARY HYPERTENSION: ICD-10-CM

## 2023-09-11 DIAGNOSIS — R55 SYNCOPE AND COLLAPSE: ICD-10-CM

## 2023-09-11 DIAGNOSIS — E78.01 FAMILIAL HYPERCHOLESTEROLEMIA: ICD-10-CM

## 2023-09-11 DIAGNOSIS — Z01.818 PRE-OP EXAM: Primary | ICD-10-CM

## 2023-09-11 PROCEDURE — 3017F COLORECTAL CA SCREEN DOC REV: CPT | Performed by: NUCLEAR MEDICINE

## 2023-09-11 PROCEDURE — 3078F DIAST BP <80 MM HG: CPT | Performed by: NUCLEAR MEDICINE

## 2023-09-11 PROCEDURE — 99213 OFFICE O/P EST LOW 20 MIN: CPT | Performed by: NUCLEAR MEDICINE

## 2023-09-11 PROCEDURE — 1090F PRES/ABSN URINE INCON ASSESS: CPT | Performed by: NUCLEAR MEDICINE

## 2023-09-11 PROCEDURE — 1036F TOBACCO NON-USER: CPT | Performed by: NUCLEAR MEDICINE

## 2023-09-11 PROCEDURE — G8400 PT W/DXA NO RESULTS DOC: HCPCS | Performed by: NUCLEAR MEDICINE

## 2023-09-11 PROCEDURE — 93000 ELECTROCARDIOGRAM COMPLETE: CPT | Performed by: NUCLEAR MEDICINE

## 2023-09-11 PROCEDURE — 1123F ACP DISCUSS/DSCN MKR DOCD: CPT | Performed by: NUCLEAR MEDICINE

## 2023-09-11 PROCEDURE — G8417 CALC BMI ABV UP PARAM F/U: HCPCS | Performed by: NUCLEAR MEDICINE

## 2023-09-11 PROCEDURE — 3074F SYST BP LT 130 MM HG: CPT | Performed by: NUCLEAR MEDICINE

## 2023-09-11 PROCEDURE — G8427 DOCREV CUR MEDS BY ELIG CLIN: HCPCS | Performed by: NUCLEAR MEDICINE

## 2023-09-11 NOTE — PROGRESS NOTES
Needs cardiac clearance for left total knee with Dr. Jess Sams scheduled on 11-2-23. EKG done today. Patient denies any cardiac symptoms.
treatment plan. Follow up as directed.     Electronically signedby Indra Garcia MD on 9/11/2023 at 1:42 PM

## 2023-09-18 DIAGNOSIS — D53.9 MACROCYTIC ANEMIA: ICD-10-CM

## 2023-09-18 DIAGNOSIS — D68.2 DYSFIBRINOGENEMIA (HCC): Primary | ICD-10-CM

## 2023-09-19 ENCOUNTER — HOSPITAL ENCOUNTER (OUTPATIENT)
Dept: INFUSION THERAPY | Age: 68
Discharge: HOME | End: 2023-09-19
Payer: MEDICARE

## 2023-09-19 ENCOUNTER — OFFICE VISIT (OUTPATIENT)
Dept: ONCOLOGY | Age: 68
End: 2023-09-19
Payer: MEDICARE

## 2023-09-19 VITALS
OXYGEN SATURATION: 96 % | SYSTOLIC BLOOD PRESSURE: 132 MMHG | WEIGHT: 257 LBS | RESPIRATION RATE: 18 BRPM | TEMPERATURE: 98.2 F | HEIGHT: 67 IN | DIASTOLIC BLOOD PRESSURE: 79 MMHG | BODY MASS INDEX: 40.34 KG/M2 | HEART RATE: 73 BPM

## 2023-09-19 VITALS
DIASTOLIC BLOOD PRESSURE: 79 MMHG | RESPIRATION RATE: 18 BRPM | HEART RATE: 73 BPM | SYSTOLIC BLOOD PRESSURE: 132 MMHG | TEMPERATURE: 98.2 F

## 2023-09-19 DIAGNOSIS — D53.9 MACROCYTIC ANEMIA: ICD-10-CM

## 2023-09-19 DIAGNOSIS — I26.99 PULMONARY EMBOLISM WITHOUT ACUTE COR PULMONALE, UNSPECIFIED CHRONICITY, UNSPECIFIED PULMONARY EMBOLISM TYPE (HCC): ICD-10-CM

## 2023-09-19 DIAGNOSIS — D68.2 DYSFIBRINOGENEMIA (HCC): Primary | ICD-10-CM

## 2023-09-19 DIAGNOSIS — D68.2 DYSFIBRINOGENEMIA (HCC): ICD-10-CM

## 2023-09-19 LAB
ABSOLUTE IMMATURE GRANULOCYTE: 0.01 THOU/MM3 (ref 0–0.07)
BASOPHILS ABSOLUTE: 0 THOU/MM3 (ref 0–0.1)
BASOPHILS NFR BLD AUTO: 0 % (ref 0–3)
EOSINOPHIL NFR BLD AUTO: 2 % (ref 0–4)
EOSINOPHILS ABSOLUTE: 0.1 THOU/MM3 (ref 0–0.4)
ERYTHROCYTE [DISTWIDTH] IN BLOOD BY AUTOMATED COUNT: 13.1 % (ref 11.5–14.5)
FERRITIN SERPL IA-MCNC: 232 NG/ML (ref 10–291)
FOLATE SERPL-MCNC: 6.9 NG/ML (ref 4.8–24.2)
HCT VFR BLD AUTO: 43.9 % (ref 37–47)
HGB BLD-MCNC: 13.9 GM/DL (ref 12–16)
IMMATURE GRANULOCYTES: 0 %
IRON SERPL-MCNC: 60 UG/DL (ref 50–170)
LYMPHOCYTES ABSOLUTE: 1.3 THOU/MM3 (ref 1–4.8)
LYMPHOCYTES NFR BLD AUTO: 26 % (ref 15–47)
MCH RBC QN AUTO: 30 PG (ref 26–33)
MCHC RBC AUTO-ENTMCNC: 31.7 GM/DL (ref 32.2–35.5)
MCV RBC AUTO: 95 FL (ref 81–99)
MONOCYTES ABSOLUTE: 0.4 THOU/MM3 (ref 0.4–1.3)
MONOCYTES NFR BLD AUTO: 8 % (ref 0–12)
NEUTROPHILS NFR BLD AUTO: 64 % (ref 43–75)
PLATELET # BLD AUTO: 267 THOU/MM3 (ref 130–400)
PMV BLD AUTO: 9.4 FL (ref 9.4–12.4)
RBC # BLD AUTO: 4.63 MILL/MM3 (ref 4.2–5.4)
SEGMENTED NEUTROPHILS ABSOLUTE COUNT: 3.2 THOU/MM3 (ref 1.8–7.7)
TIBC SERPL-MCNC: 317 UG/DL (ref 171–450)
VIT B12 SERPL-MCNC: 416 PG/ML (ref 211–911)
WBC # BLD AUTO: 5.1 THOU/MM3 (ref 4.8–10.8)

## 2023-09-19 PROCEDURE — G8427 DOCREV CUR MEDS BY ELIG CLIN: HCPCS | Performed by: PHYSICIAN ASSISTANT

## 2023-09-19 PROCEDURE — 82728 ASSAY OF FERRITIN: CPT

## 2023-09-19 PROCEDURE — 99211 OFF/OP EST MAY X REQ PHY/QHP: CPT

## 2023-09-19 PROCEDURE — 36415 COLL VENOUS BLD VENIPUNCTURE: CPT

## 2023-09-19 PROCEDURE — 85025 COMPLETE CBC W/AUTO DIFF WBC: CPT

## 2023-09-19 PROCEDURE — 82746 ASSAY OF FOLIC ACID SERUM: CPT

## 2023-09-19 PROCEDURE — 3017F COLORECTAL CA SCREEN DOC REV: CPT | Performed by: PHYSICIAN ASSISTANT

## 2023-09-19 PROCEDURE — 99213 OFFICE O/P EST LOW 20 MIN: CPT | Performed by: PHYSICIAN ASSISTANT

## 2023-09-19 PROCEDURE — 83550 IRON BINDING TEST: CPT

## 2023-09-19 PROCEDURE — 83540 ASSAY OF IRON: CPT

## 2023-09-19 PROCEDURE — 1123F ACP DISCUSS/DSCN MKR DOCD: CPT | Performed by: PHYSICIAN ASSISTANT

## 2023-09-19 PROCEDURE — 1090F PRES/ABSN URINE INCON ASSESS: CPT | Performed by: PHYSICIAN ASSISTANT

## 2023-09-19 PROCEDURE — 82607 VITAMIN B-12: CPT

## 2023-09-19 PROCEDURE — G8417 CALC BMI ABV UP PARAM F/U: HCPCS | Performed by: PHYSICIAN ASSISTANT

## 2023-09-19 PROCEDURE — 1036F TOBACCO NON-USER: CPT | Performed by: PHYSICIAN ASSISTANT

## 2023-09-19 PROCEDURE — G8400 PT W/DXA NO RESULTS DOC: HCPCS | Performed by: PHYSICIAN ASSISTANT

## 2023-09-19 NOTE — PROGRESS NOTES
[facility] [address] [place] [address] Quentin Barreto [address] 200  [address] [facility] [address] [id number]  Dept: [phone]  Dept Fax: [phone]: [phone]      Visit Date:9/19/2023     Rios Byrd is a 79 y.o. female who presents today for:   Chief Complaint   Patient presents with    Follow-up     Dysfibrinogenemia [facility])        HPI:   Rios Byrd is a 79 y.o. female recently seen during hospitalization 5/20/2023 to 5/23/23. The patient presented to the ED on 5/20/23 following syncopal episode at home. She underwent total knee arthroplasty on 5/18/23 at [facility] per Dr. Mortimer Commons and was discharged on 5/19/23. Prior to ED evaluation, the patient stood to go to bathroom and experienced lightheadedness. She developed syncope and was brought to the ED for further evaluation. Syncopal event was witnessed by her daughter who is RN. In the ED, CTA of chest completed which revealed small filling defect in seen in the segmental branch to the anterior segment of the right lower lobe consistent with acute pulmonary embolus. Nonspecific mild distal esophageal wall thickening. She was started on IV heparin infusion. The patient has history of dysfibrinogenemia and Heme/Onc consulted. The patient was recommended Lovenox during hospitalization and at discharge. Her fibrinogen level was monitored during hospitalization and remained greater than 50mg/dL. The patient was evaluated by Neurology during hospitalization. Patient was instructed to follow up with Cardiology. Tilt table was unable to be completed with recent knee surgery. The patient was [facility] for dysfibrinogenemia. She was previously seen in 2020 at [facility] and wanted to establish at  where her sister with disorder is seen. 5/30/23- tolerating Lovenox well. No abnormal bleeding reported. 6/27/23- tolerating Lovenox. No abnormal bleeding. Follow up scheduled at  for expert opinion.      Interval History 9/19/2023:   The patient is here for

## 2023-10-16 ENCOUNTER — OFFICE VISIT (OUTPATIENT)
Dept: FAMILY MEDICINE CLINIC | Age: 68
End: 2023-10-16
Payer: MEDICARE

## 2023-10-16 VITALS
BODY MASS INDEX: 40.81 KG/M2 | HEIGHT: 67 IN | HEART RATE: 84 BPM | DIASTOLIC BLOOD PRESSURE: 78 MMHG | OXYGEN SATURATION: 98 % | TEMPERATURE: 97.6 F | RESPIRATION RATE: 20 BRPM | WEIGHT: 260 LBS | SYSTOLIC BLOOD PRESSURE: 126 MMHG

## 2023-10-16 DIAGNOSIS — Z01.818 PRE-OP EXAMINATION: Primary | ICD-10-CM

## 2023-10-16 DIAGNOSIS — D68.2 DYSFIBRINOGENEMIA (HCC): ICD-10-CM

## 2023-10-16 DIAGNOSIS — M17.12 ARTHRITIS OF LEFT KNEE: ICD-10-CM

## 2023-10-16 PROBLEM — M17.11 ARTHRITIS OF RIGHT KNEE: Status: ACTIVE | Noted: 2023-05-18

## 2023-10-16 PROCEDURE — G8417 CALC BMI ABV UP PARAM F/U: HCPCS | Performed by: FAMILY MEDICINE

## 2023-10-16 PROCEDURE — G8484 FLU IMMUNIZE NO ADMIN: HCPCS | Performed by: FAMILY MEDICINE

## 2023-10-16 PROCEDURE — 1123F ACP DISCUSS/DSCN MKR DOCD: CPT | Performed by: FAMILY MEDICINE

## 2023-10-16 PROCEDURE — 3017F COLORECTAL CA SCREEN DOC REV: CPT | Performed by: FAMILY MEDICINE

## 2023-10-16 PROCEDURE — G8400 PT W/DXA NO RESULTS DOC: HCPCS | Performed by: FAMILY MEDICINE

## 2023-10-16 PROCEDURE — 1090F PRES/ABSN URINE INCON ASSESS: CPT | Performed by: FAMILY MEDICINE

## 2023-10-16 PROCEDURE — G8427 DOCREV CUR MEDS BY ELIG CLIN: HCPCS | Performed by: FAMILY MEDICINE

## 2023-10-16 PROCEDURE — 99214 OFFICE O/P EST MOD 30 MIN: CPT | Performed by: FAMILY MEDICINE

## 2023-10-16 PROCEDURE — 1036F TOBACCO NON-USER: CPT | Performed by: FAMILY MEDICINE

## 2023-10-16 ASSESSMENT — ENCOUNTER SYMPTOMS
CHEST TIGHTNESS: 0
BLOOD IN STOOL: 0
DIARRHEA: 0
COUGH: 0
SORE THROAT: 0
EYE PAIN: 0
ABDOMINAL PAIN: 0
WHEEZING: 0
RHINORRHEA: 0
NAUSEA: 0
VOMITING: 0
CONSTIPATION: 0
SHORTNESS OF BREATH: 0
BACK PAIN: 0

## 2023-10-16 NOTE — PROGRESS NOTES
Orlin Bullock (:  1955) is a 79 y.o. female,Established patient, here for evaluation of the following chief complaint(s):  Pre-op Exam (No testing performed, [facility] Left total knee on 2023)         ASSESSMENT/PLAN:  1. 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 Arthritis of left knee    Await pre-op work up, addendum will be done for clearance. Addendum 10/19/2023:  Reviewed blood work:  cbc, bmp and INR are good. Has been cleared for cardiac per Dr. Chris Schmid. After reviewing history, physical and pre-op work up, she is cleared for proposed surgery. Note to Dr. Shawn Arreaga. Electronically signed by Casandra Goldberg MD on 10/19/2023 at 1:00 PM      No follow-ups on file. Subjective   SUBJECTIVE/OBJECTIVE:  HPI   Patient here today for a pre-op physical at the request of Dr. Shawn Arreaga. Scheduled to have Left TKR on 2023 tentatively at [facility] due to left knee arthritis. Reviewed BMI of 41. Needs pre-op work up. Last EKG 2023 was good. Normal stress test  [facility]). Pt has a normal functional capacity with METS > 4. No previous anesthesia issues. No chest pains or SOB. , non smoker, pmh  reviewed. Review of Systems   Constitutional:  Negative for chills, fatigue, fever and unexpected weight change. HENT:  Negative for congestion, ear pain, rhinorrhea and sore throat. Eyes:  Negative for pain and visual disturbance. Respiratory:  Negative for cough, chest tightness, shortness of breath and wheezing. Cardiovascular:  Negative for chest pain and palpitations. Gastrointestinal:  Negative for abdominal pain, blood in stool, constipation, diarrhea, nausea and vomiting. Genitourinary:  Negative for difficulty urinating, frequency, hematuria and urgency. Musculoskeletal:  Negative for back pain, joint swelling, myalgias and neck pain. Left knee pain   Skin:  Negative for rash. Neurological:  Negative for dizziness and headaches.

## 2023-10-18 ENCOUNTER — HOSPITAL ENCOUNTER (OUTPATIENT)
Age: 68
Discharge: HOME | End: 2023-10-18
Payer: MEDICARE

## 2023-10-18 LAB
ANION GAP SERPL CALC-SCNC: 13 MEQ/L (ref 8–16)
BASOPHILS ABSOLUTE: 0 THOU/MM3 (ref 0–0.1)
BASOPHILS NFR BLD AUTO: 0.7 %
BUN SERPL-MCNC: 23 MG/DL (ref 7–22)
CALCIUM SERPL-MCNC: 9.7 MG/DL (ref 8.5–10.5)
CHLORIDE SERPL-SCNC: 101 MEQ/L (ref 98–111)
CO2 SERPL-SCNC: 25 MEQ/L (ref 23–33)
CREAT SERPL-MCNC: 1 MG/DL (ref 0.4–1.2)
DEPRECATED RDW RBC AUTO: 48.5 FL (ref 35–45)
EOSINOPHIL NFR BLD AUTO: 1.3 %
EOSINOPHILS ABSOLUTE: 0.1 THOU/MM3 (ref 0–0.4)
ERYTHROCYTE [DISTWIDTH] IN BLOOD BY AUTOMATED COUNT: 13.7 % (ref 11.5–14.5)
GFR SERPL CREATININE-BSD FRML MDRD: > 60 ML/MIN/1.73M2
GLUCOSE SERPL-MCNC: 84 MG/DL (ref 70–108)
HCT VFR BLD AUTO: 40.8 % (ref 37–47)
HGB BLD-MCNC: 12.9 GM/DL (ref 12–16)
IMM GRANULOCYTES # BLD AUTO: 0.02 THOU/MM3 (ref 0–0.07)
IMM GRANULOCYTES NFR BLD AUTO: 0.3 %
LYMPHOCYTES ABSOLUTE: 1.7 THOU/MM3 (ref 1–4.8)
LYMPHOCYTES NFR BLD AUTO: 25.2 %
MCH RBC QN AUTO: 30.5 PG (ref 26–33)
MCHC RBC AUTO-ENTMCNC: 31.6 GM/DL (ref 32.2–35.5)
MCV RBC AUTO: 96.5 FL (ref 81–99)
MONOCYTES ABSOLUTE: 0.4 THOU/MM3 (ref 0.4–1.3)
MONOCYTES NFR BLD AUTO: 6.5 %
NEUTROPHILS NFR BLD AUTO: 66 %
NRBC BLD AUTO-RTO: 0 /100 WBC
PLATELET # BLD AUTO: 267 THOU/MM3 (ref 130–400)
PMV BLD AUTO: 10.1 FL (ref 9.4–12.4)
POTASSIUM SERPL-SCNC: 4.1 MEQ/L (ref 3.5–5.2)
RBC # BLD AUTO: 4.23 MILL/MM3 (ref 4.2–5.4)
SEGMENTED NEUTROPHILS ABSOLUTE COUNT: 4.5 THOU/MM3 (ref 1.8–7.7)
SODIUM SERPL-SCNC: 139 MEQ/L (ref 135–145)
WBC # BLD AUTO: 6.8 THOU/MM3 (ref 4.8–10.8)

## 2023-10-18 PROCEDURE — 85025 COMPLETE CBC W/AUTO DIFF WBC: CPT

## 2023-10-18 PROCEDURE — 85610 PROTHROMBIN TIME: CPT

## 2023-10-18 PROCEDURE — 36415 COLL VENOUS BLD VENIPUNCTURE: CPT

## 2023-10-18 PROCEDURE — 80048 BASIC METABOLIC PNL TOTAL CA: CPT

## 2023-10-19 ENCOUNTER — TELEPHONE (OUTPATIENT)
Dept: FAMILY MEDICINE CLINIC | Age: 68
End: 2023-10-19

## 2023-10-19 LAB — INR PPP: 1.07 (ref 0.85–1.13)

## 2023-10-23 ENCOUNTER — HOSPITAL ENCOUNTER (OUTPATIENT)
Age: 68
Discharge: HOME | End: 2023-10-23
Payer: MEDICARE

## 2023-10-23 LAB — D DIMER PPP IA.FEU-MCNC: 555 NG/ML FEU (ref 0–500)

## 2023-10-23 PROCEDURE — 36415 COLL VENOUS BLD VENIPUNCTURE: CPT

## 2023-10-23 PROCEDURE — 85379 FIBRIN DEGRADATION QUANT: CPT

## 2024-01-22 DIAGNOSIS — E78.00 HYPERCHOLESTEREMIA: ICD-10-CM

## 2024-01-22 RX ORDER — ROSUVASTATIN CALCIUM 10 MG/1
10 TABLET, COATED ORAL NIGHTLY
Qty: 90 TABLET | Refills: 3 | OUTPATIENT
Start: 2024-01-22

## 2024-01-22 NOTE — TELEPHONE ENCOUNTER
Pt due for annual for refills, appt scheduled for 2/8/24  Verified with pt she thinks she has enough meds to get to appt, will call if not

## 2024-02-07 SDOH — HEALTH STABILITY: PHYSICAL HEALTH: ON AVERAGE, HOW MANY MINUTES DO YOU ENGAGE IN EXERCISE AT THIS LEVEL?: 60 MIN

## 2024-02-07 ASSESSMENT — PATIENT HEALTH QUESTIONNAIRE - PHQ9
2. FEELING DOWN, DEPRESSED OR HOPELESS: 0
1. LITTLE INTEREST OR PLEASURE IN DOING THINGS: 0
SUM OF ALL RESPONSES TO PHQ QUESTIONS 1-9: 0
SUM OF ALL RESPONSES TO PHQ9 QUESTIONS 1 & 2: 0

## 2024-02-07 ASSESSMENT — LIFESTYLE VARIABLES
HOW OFTEN DO YOU HAVE A DRINK CONTAINING ALCOHOL: MONTHLY OR LESS
HOW OFTEN DO YOU HAVE A DRINK CONTAINING ALCOHOL: 2
HOW MANY STANDARD DRINKS CONTAINING ALCOHOL DO YOU HAVE ON A TYPICAL DAY: 1 OR 2
HOW MANY STANDARD DRINKS CONTAINING ALCOHOL DO YOU HAVE ON A TYPICAL DAY: 1
HOW OFTEN DO YOU HAVE SIX OR MORE DRINKS ON ONE OCCASION: 1

## 2024-02-08 ENCOUNTER — OFFICE VISIT (OUTPATIENT)
Dept: FAMILY MEDICINE CLINIC | Age: 69
End: 2024-02-08
Payer: MEDICARE

## 2024-02-08 VITALS
TEMPERATURE: 97 F | SYSTOLIC BLOOD PRESSURE: 124 MMHG | OXYGEN SATURATION: 95 % | WEIGHT: 262.2 LBS | BODY MASS INDEX: 42.14 KG/M2 | HEART RATE: 85 BPM | HEIGHT: 66 IN | DIASTOLIC BLOOD PRESSURE: 66 MMHG | RESPIRATION RATE: 18 BRPM

## 2024-02-08 DIAGNOSIS — E66.01 OBESITY, CLASS III, BMI 40-49.9 (MORBID OBESITY) (HCC): ICD-10-CM

## 2024-02-08 DIAGNOSIS — R53.83 OTHER FATIGUE: ICD-10-CM

## 2024-02-08 DIAGNOSIS — D68.2 DYSFIBRINOGENEMIA (HCC): ICD-10-CM

## 2024-02-08 DIAGNOSIS — Z00.00 MEDICARE ANNUAL WELLNESS VISIT, SUBSEQUENT: Primary | ICD-10-CM

## 2024-02-08 DIAGNOSIS — E78.00 HYPERCHOLESTEREMIA: ICD-10-CM

## 2024-02-08 PROBLEM — I26.99 ACUTE PULMONARY EMBOLISM WITHOUT ACUTE COR PULMONALE, UNSPECIFIED PULMONARY EMBOLISM TYPE (HCC): Status: RESOLVED | Noted: 2023-05-22 | Resolved: 2024-02-08

## 2024-02-08 PROBLEM — I26.99 PULMONARY EMBOLISM WITHOUT ACUTE COR PULMONALE, UNSPECIFIED CHRONICITY, UNSPECIFIED PULMONARY EMBOLISM TYPE (HCC): Status: RESOLVED | Noted: 2023-05-20 | Resolved: 2024-02-08

## 2024-02-08 PROBLEM — Z96.652 S/P TOTAL KNEE ARTHROPLASTY, LEFT: Status: ACTIVE | Noted: 2023-11-02

## 2024-02-08 LAB
ALBUMIN SERPL BCG-MCNC: 4.3 G/DL (ref 3.5–5.1)
ALP SERPL-CCNC: 80 U/L (ref 38–126)
ALT SERPL W/O P-5'-P-CCNC: 18 U/L (ref 11–66)
ANION GAP SERPL CALC-SCNC: 15 MEQ/L (ref 8–16)
AST SERPL-CCNC: 19 U/L (ref 5–40)
BASOPHILS ABSOLUTE: 0 THOU/MM3 (ref 0–0.1)
BASOPHILS NFR BLD AUTO: 0.7 %
BILIRUB SERPL-MCNC: 0.3 MG/DL (ref 0.3–1.2)
BUN SERPL-MCNC: 22 MG/DL (ref 7–22)
CALCIUM SERPL-MCNC: 9.7 MG/DL (ref 8.5–10.5)
CHLORIDE SERPL-SCNC: 102 MEQ/L (ref 98–111)
CHOLEST SERPL-MCNC: 182 MG/DL (ref 100–199)
CO2 SERPL-SCNC: 26 MEQ/L (ref 23–33)
CREAT SERPL-MCNC: 0.9 MG/DL (ref 0.4–1.2)
DEPRECATED RDW RBC AUTO: 41.8 FL (ref 35–45)
EOSINOPHIL NFR BLD AUTO: 1.6 %
EOSINOPHILS ABSOLUTE: 0.1 THOU/MM3 (ref 0–0.4)
ERYTHROCYTE [DISTWIDTH] IN BLOOD BY AUTOMATED COUNT: 12.1 % (ref 11.5–14.5)
GFR SERPL CREATININE-BSD FRML MDRD: > 60 ML/MIN/1.73M2
GLUCOSE SERPL-MCNC: 95 MG/DL (ref 70–108)
HCT VFR BLD AUTO: 43.4 % (ref 37–47)
HDLC SERPL-MCNC: 57 MG/DL
HGB BLD-MCNC: 14 GM/DL (ref 12–16)
IMM GRANULOCYTES # BLD AUTO: 0 THOU/MM3 (ref 0–0.07)
IMM GRANULOCYTES NFR BLD AUTO: 0 %
LDLC SERPL CALC-MCNC: 88 MG/DL
LYMPHOCYTES ABSOLUTE: 1.6 THOU/MM3 (ref 1–4.8)
LYMPHOCYTES NFR BLD AUTO: 27.7 %
MCH RBC QN AUTO: 30.5 PG (ref 26–33)
MCHC RBC AUTO-ENTMCNC: 32.3 GM/DL (ref 32.2–35.5)
MCV RBC AUTO: 94.6 FL (ref 81–99)
MONOCYTES ABSOLUTE: 0.4 THOU/MM3 (ref 0.4–1.3)
MONOCYTES NFR BLD AUTO: 7.6 %
NEUTROPHILS NFR BLD AUTO: 62.4 %
NRBC BLD AUTO-RTO: 0 /100 WBC
PLATELET # BLD AUTO: 235 THOU/MM3 (ref 130–400)
PMV BLD AUTO: 9.9 FL (ref 9.4–12.4)
POTASSIUM SERPL-SCNC: 4.2 MEQ/L (ref 3.5–5.2)
PROT SERPL-MCNC: 7.1 G/DL (ref 6.1–8)
RBC # BLD AUTO: 4.59 MILL/MM3 (ref 4.2–5.4)
SEGMENTED NEUTROPHILS ABSOLUTE COUNT: 3.6 THOU/MM3 (ref 1.8–7.7)
SODIUM SERPL-SCNC: 143 MEQ/L (ref 135–145)
TRIGL SERPL-MCNC: 183 MG/DL (ref 0–199)
TSH SERPL DL<=0.005 MIU/L-ACNC: 1.7 UIU/ML (ref 0.4–4.2)
WBC # BLD AUTO: 5.7 THOU/MM3 (ref 4.8–10.8)

## 2024-02-08 PROCEDURE — G0439 PPPS, SUBSEQ VISIT: HCPCS | Performed by: FAMILY MEDICINE

## 2024-02-08 PROCEDURE — 1123F ACP DISCUSS/DSCN MKR DOCD: CPT | Performed by: FAMILY MEDICINE

## 2024-02-08 PROCEDURE — G2211 COMPLEX E/M VISIT ADD ON: HCPCS | Performed by: FAMILY MEDICINE

## 2024-02-08 PROCEDURE — 3017F COLORECTAL CA SCREEN DOC REV: CPT | Performed by: FAMILY MEDICINE

## 2024-02-08 PROCEDURE — G8484 FLU IMMUNIZE NO ADMIN: HCPCS | Performed by: FAMILY MEDICINE

## 2024-02-08 RX ORDER — ROSUVASTATIN CALCIUM 10 MG/1
10 TABLET, COATED ORAL NIGHTLY
Qty: 90 TABLET | Refills: 3 | Status: SHIPPED | OUTPATIENT
Start: 2024-02-08

## 2024-02-08 SDOH — ECONOMIC STABILITY: INCOME INSECURITY: HOW HARD IS IT FOR YOU TO PAY FOR THE VERY BASICS LIKE FOOD, HOUSING, MEDICAL CARE, AND HEATING?: NOT HARD AT ALL

## 2024-02-08 SDOH — ECONOMIC STABILITY: FOOD INSECURITY: WITHIN THE PAST 12 MONTHS, YOU WORRIED THAT YOUR FOOD WOULD RUN OUT BEFORE YOU GOT MONEY TO BUY MORE.: NEVER TRUE

## 2024-02-08 SDOH — ECONOMIC STABILITY: FOOD INSECURITY: WITHIN THE PAST 12 MONTHS, THE FOOD YOU BOUGHT JUST DIDN'T LAST AND YOU DIDN'T HAVE MONEY TO GET MORE.: NEVER TRUE

## 2024-02-08 ASSESSMENT — PATIENT HEALTH QUESTIONNAIRE - PHQ9
SUM OF ALL RESPONSES TO PHQ QUESTIONS 1-9: 0
SUM OF ALL RESPONSES TO PHQ QUESTIONS 1-9: 0
8. MOVING OR SPEAKING SO SLOWLY THAT OTHER PEOPLE COULD HAVE NOTICED. OR THE OPPOSITE, BEING SO FIGETY OR RESTLESS THAT YOU HAVE BEEN MOVING AROUND A LOT MORE THAN USUAL: 0
3. TROUBLE FALLING OR STAYING ASLEEP: 0
6. FEELING BAD ABOUT YOURSELF - OR THAT YOU ARE A FAILURE OR HAVE LET YOURSELF OR YOUR FAMILY DOWN: 0
1. LITTLE INTEREST OR PLEASURE IN DOING THINGS: 0
SUM OF ALL RESPONSES TO PHQ9 QUESTIONS 1 & 2: 0
7. TROUBLE CONCENTRATING ON THINGS, SUCH AS READING THE NEWSPAPER OR WATCHING TELEVISION: 0
9. THOUGHTS THAT YOU WOULD BE BETTER OFF DEAD, OR OF HURTING YOURSELF: 0
2. FEELING DOWN, DEPRESSED OR HOPELESS: 0
SUM OF ALL RESPONSES TO PHQ QUESTIONS 1-9: 0
5. POOR APPETITE OR OVEREATING: 0
10. IF YOU CHECKED OFF ANY PROBLEMS, HOW DIFFICULT HAVE THESE PROBLEMS MADE IT FOR YOU TO DO YOUR WORK, TAKE CARE OF THINGS AT HOME, OR GET ALONG WITH OTHER PEOPLE: 0
SUM OF ALL RESPONSES TO PHQ QUESTIONS 1-9: 0

## 2024-02-08 NOTE — PROGRESS NOTES
Blood work drawn today in the office, venous puncture, left arm, pt tolerated well.  
providers/suppliers regularly involved in providing care):   Patient Care Team:  Bigg Gracia MD as PCP - General  Bigg Gracia MD as PCP - Empaneled Provider  Odilia Shaikh MD as Obstetrician (Obstetrics & Gynecology)     Reviewed and updated this visit:  Tobacco  Allergies  Meds  Problems  Med Hx  Surg Hx  Soc Hx  Fam Hx

## 2024-02-23 ENCOUNTER — HOSPITAL ENCOUNTER (OUTPATIENT)
Dept: WOMENS IMAGING | Age: 69
Discharge: HOME | End: 2024-02-23
Payer: MEDICARE

## 2024-02-23 ENCOUNTER — HOSPITAL ENCOUNTER (OUTPATIENT)
Dept: WOMENS IMAGING | Age: 69
Discharge: HOME | End: 2024-02-23
Attending: OBSTETRICS & GYNECOLOGY
Payer: MEDICARE

## 2024-02-23 DIAGNOSIS — Z12.31 VISIT FOR SCREENING MAMMOGRAM: ICD-10-CM

## 2024-02-23 DIAGNOSIS — Z78.0 POSTMENOPAUSE: ICD-10-CM

## 2024-02-23 PROCEDURE — 77063 BREAST TOMOSYNTHESIS BI: CPT

## 2024-02-26 ENCOUNTER — TELEPHONE (OUTPATIENT)
Dept: FAMILY MEDICINE CLINIC | Age: 69
End: 2024-02-26

## 2024-02-26 NOTE — TELEPHONE ENCOUNTER
----- Message from Bigg Gracia MD sent at 2/26/2024  1:01 PM EST -----  Normal mammogram, continue yearly screenings

## 2024-03-11 ENCOUNTER — OFFICE VISIT (OUTPATIENT)
Dept: CARDIOLOGY CLINIC | Age: 69
End: 2024-03-11
Payer: MEDICARE

## 2024-03-11 VITALS
WEIGHT: 265.2 LBS | BODY MASS INDEX: 41.62 KG/M2 | DIASTOLIC BLOOD PRESSURE: 78 MMHG | HEART RATE: 76 BPM | HEIGHT: 67 IN | SYSTOLIC BLOOD PRESSURE: 122 MMHG

## 2024-03-11 DIAGNOSIS — E78.01 FAMILIAL HYPERCHOLESTEROLEMIA: Primary | ICD-10-CM

## 2024-03-11 PROCEDURE — 3017F COLORECTAL CA SCREEN DOC REV: CPT | Performed by: NUCLEAR MEDICINE

## 2024-03-11 PROCEDURE — G8484 FLU IMMUNIZE NO ADMIN: HCPCS | Performed by: NUCLEAR MEDICINE

## 2024-03-11 PROCEDURE — 1123F ACP DISCUSS/DSCN MKR DOCD: CPT | Performed by: NUCLEAR MEDICINE

## 2024-03-11 PROCEDURE — G8427 DOCREV CUR MEDS BY ELIG CLIN: HCPCS | Performed by: NUCLEAR MEDICINE

## 2024-03-11 PROCEDURE — G8399 PT W/DXA RESULTS DOCUMENT: HCPCS | Performed by: NUCLEAR MEDICINE

## 2024-03-11 PROCEDURE — 99213 OFFICE O/P EST LOW 20 MIN: CPT | Performed by: NUCLEAR MEDICINE

## 2024-03-11 PROCEDURE — 1036F TOBACCO NON-USER: CPT | Performed by: NUCLEAR MEDICINE

## 2024-03-11 PROCEDURE — 1090F PRES/ABSN URINE INCON ASSESS: CPT | Performed by: NUCLEAR MEDICINE

## 2024-03-11 PROCEDURE — G8417 CALC BMI ABV UP PARAM F/U: HCPCS | Performed by: NUCLEAR MEDICINE

## 2024-03-11 NOTE — PROGRESS NOTES
Patient here for check up.  Patient denies any cardiac symptoms.   
alert, oriented. No obvious focal deficits  Musculoskelatal:  No obvious deformities   /78   Pulse 76   Ht 1.689 m (5' 6.5\")   Wt 120.3 kg (265 lb 3.2 oz)   BMI 42.16 kg/m²     Assessment:      Diagnosis Orders   1. Familial hypercholesterolemia        As above  Cardiac fair for now     Plan:  No follow-ups on file.  As above  Continue risk factor modification and medical management  Thank you for allowing me to participate in the care of your patient. Please don't hesitate to contact me regarding any further issues related to the patient care    Orders Placed:  No orders of the defined types were placed in this encounter.      Prescribed:  No orders of the defined types were placed in this encounter.         Discussed use, benefit, and side effects of prescribed medications. All patient questions answered. Pt voicedunderstanding. Instructed to continue current medications, diet and exercise. Continue risk factor modification and medical management. Patient agreed with treatment plan. Follow up as directed.    Electronically signedby Edi Ramsay MD on 3/11/2024 at 1:55 PM

## 2025-02-03 ENCOUNTER — TRANSCRIBE ORDERS (OUTPATIENT)
Dept: ADMINISTRATIVE | Age: 70
End: 2025-02-03

## 2025-02-03 DIAGNOSIS — Z12.31 ENCOUNTER FOR SCREENING MAMMOGRAM FOR MALIGNANT NEOPLASM OF BREAST: Primary | ICD-10-CM

## 2025-02-05 ENCOUNTER — HOSPITAL ENCOUNTER (EMERGENCY)
Age: 70
Discharge: HOME | End: 2025-02-05
Payer: MEDICARE

## 2025-02-05 VITALS
DIASTOLIC BLOOD PRESSURE: 67 MMHG | OXYGEN SATURATION: 97 % | RESPIRATION RATE: 20 BRPM | SYSTOLIC BLOOD PRESSURE: 149 MMHG | HEART RATE: 74 BPM | TEMPERATURE: 97.2 F

## 2025-02-05 DIAGNOSIS — J20.9 ACUTE BRONCHITIS, UNSPECIFIED ORGANISM: Primary | ICD-10-CM

## 2025-02-05 PROCEDURE — 99213 OFFICE O/P EST LOW 20 MIN: CPT

## 2025-02-05 RX ORDER — BENZONATATE 200 MG/1
200 CAPSULE ORAL 3 TIMES DAILY PRN
Qty: 30 CAPSULE | Refills: 0 | Status: SHIPPED | OUTPATIENT
Start: 2025-02-05 | End: 2025-02-10

## 2025-02-05 RX ORDER — CHOLECALCIFEROL (VITAMIN D3) 1250 MCG
CAPSULE ORAL
COMMUNITY

## 2025-02-05 RX ORDER — PREDNISONE 20 MG/1
40 TABLET ORAL DAILY
Qty: 10 TABLET | Refills: 0 | Status: SHIPPED | OUTPATIENT
Start: 2025-02-05 | End: 2025-02-10

## 2025-02-05 ASSESSMENT — ENCOUNTER SYMPTOMS
COUGH: 1
NAUSEA: 0
VOMITING: 0
DIARRHEA: 0
ALLERGIC/IMMUNOLOGIC NEGATIVE: 1
WHEEZING: 1
SHORTNESS OF BREATH: 1

## 2025-02-05 ASSESSMENT — PAIN SCALES - GENERAL: PAINLEVEL_OUTOF10: 4

## 2025-02-05 ASSESSMENT — PAIN - FUNCTIONAL ASSESSMENT
PAIN_FUNCTIONAL_ASSESSMENT: ACTIVITIES ARE NOT PREVENTED
PAIN_FUNCTIONAL_ASSESSMENT: 0-10

## 2025-02-05 ASSESSMENT — PAIN DESCRIPTION - LOCATION: LOCATION: CHEST

## 2025-02-05 ASSESSMENT — PAIN DESCRIPTION - DESCRIPTORS: DESCRIPTORS: ACHING;TIGHTNESS

## 2025-02-05 ASSESSMENT — PAIN DESCRIPTION - PAIN TYPE: TYPE: ACUTE PAIN

## 2025-02-05 ASSESSMENT — PAIN DESCRIPTION - ONSET: ONSET: GRADUAL

## 2025-02-05 ASSESSMENT — PAIN DESCRIPTION - FREQUENCY: FREQUENCY: INTERMITTENT

## 2025-02-05 NOTE — ED PROVIDER NOTES
[facility] EMERGENCY DEPARTMENT  Urgent Care Encounter       CHIEF COMPLAINT       Chief Complaint   Patient presents with    Cough    Wheezing    Shortness of Breath       Nurses Notes reviewed and I agree except as noted in the HPI.  HISTORY OF PRESENT ILLNESS   Swathi Nelson is a 70 y.o. female who presents to urgent care with complaints of cough, wheezing and shortness of breath.  Symptoms started 10 days ago.  States symptoms started 10 days ago with fever, congestion and cough.  Fever improved and cough is still lingering.  Denies over-the-counter medications.  Denies nausea, vomiting and diarrhea.    The history is provided by the patient. No  was used.       REVIEW OF SYSTEMS     Review of Systems   Constitutional:  Positive for fever.   HENT:  Positive for congestion.    Respiratory:  Positive for cough, shortness of breath and wheezing.    Gastrointestinal:  Negative for diarrhea, nausea and vomiting.   Endocrine: Negative.    Genitourinary: Negative.    Musculoskeletal: Negative.    Skin: Negative.    Allergic/Immunologic: Negative.    Neurological: Negative.    Hematological: Negative.    Psychiatric/Behavioral: Negative.         PAST MEDICAL HISTORY         Diagnosis Date    BRCA1 negative 2019    negative per pt    BRCA2 negative 2019    neg per pt    Depression     Dysfibrinogenemia (HCC)     Glucose intolerance (impaired glucose tolerance)     Heart palpitations     HX OF:    History of cold sores     History of fatigue     Hyperlipidemia     Obesity, mild     Vitamin D deficiency        SURGICALHISTORY     Patient  has a past surgical history that includes transthoracic echocardiogram (04/29/2011); Appendectomy; Tonsillectomy; Colonoscopy; pr colon ca scrn not hi rsk ind (N/A, 01/08/2018); Incisional breast biopsy (Right, 1978); Total knee arthroplasty (Right, 05/18/2023); joint replacement (Right, 05/18/2023); and joint replacement (Left,

## 2025-02-05 NOTE — DISCHARGE INSTRUCTIONS
Medications as prescribed.  Increase fluid intake.  Can take over-the-counter Zyrtec and Flonase for congestion.  Follow-up with family doctor in 3 to 5 days.  Go to the emergency room for any difficulty breathing new or worsening symptoms.

## 2025-02-09 SDOH — ECONOMIC STABILITY: FOOD INSECURITY: WITHIN THE PAST 12 MONTHS, THE FOOD YOU BOUGHT JUST DIDN'T LAST AND YOU DIDN'T HAVE MONEY TO GET MORE.: NEVER TRUE

## 2025-02-09 SDOH — ECONOMIC STABILITY: TRANSPORTATION INSECURITY
IN THE PAST 12 MONTHS, HAS THE LACK OF TRANSPORTATION KEPT YOU FROM MEDICAL APPOINTMENTS OR FROM GETTING MEDICATIONS?: NO

## 2025-02-09 SDOH — HEALTH STABILITY: PHYSICAL HEALTH: ON AVERAGE, HOW MANY DAYS PER WEEK DO YOU ENGAGE IN MODERATE TO STRENUOUS EXERCISE (LIKE A BRISK WALK)?: 3 DAYS

## 2025-02-09 SDOH — ECONOMIC STABILITY: TRANSPORTATION INSECURITY
IN THE PAST 12 MONTHS, HAS LACK OF TRANSPORTATION KEPT YOU FROM MEETINGS, WORK, OR FROM GETTING THINGS NEEDED FOR DAILY LIVING?: NO

## 2025-02-09 SDOH — HEALTH STABILITY: PHYSICAL HEALTH: ON AVERAGE, HOW MANY MINUTES DO YOU ENGAGE IN EXERCISE AT THIS LEVEL?: 60 MIN

## 2025-02-09 SDOH — ECONOMIC STABILITY: INCOME INSECURITY: IN THE LAST 12 MONTHS, WAS THERE A TIME WHEN YOU WERE NOT ABLE TO PAY THE MORTGAGE OR RENT ON TIME?: NO

## 2025-02-09 SDOH — ECONOMIC STABILITY: FOOD INSECURITY: WITHIN THE PAST 12 MONTHS, YOU WORRIED THAT YOUR FOOD WOULD RUN OUT BEFORE YOU GOT MONEY TO BUY MORE.: NEVER TRUE

## 2025-02-09 ASSESSMENT — PATIENT HEALTH QUESTIONNAIRE - PHQ9
8. MOVING OR SPEAKING SO SLOWLY THAT OTHER PEOPLE COULD HAVE NOTICED. OR THE OPPOSITE, BEING SO FIGETY OR RESTLESS THAT YOU HAVE BEEN MOVING AROUND A LOT MORE THAN USUAL: NOT AT ALL
5. POOR APPETITE OR OVEREATING: NOT AT ALL
3. TROUBLE FALLING OR STAYING ASLEEP: NOT AT ALL
SUM OF ALL RESPONSES TO PHQ QUESTIONS 1-9: 0
4. FEELING TIRED OR HAVING LITTLE ENERGY: NOT AT ALL
7. TROUBLE CONCENTRATING ON THINGS, SUCH AS READING THE NEWSPAPER OR WATCHING TELEVISION: NOT AT ALL
1. LITTLE INTEREST OR PLEASURE IN DOING THINGS: NOT AT ALL
SUM OF ALL RESPONSES TO PHQ QUESTIONS 1-9: 0
2. FEELING DOWN, DEPRESSED OR HOPELESS: NOT AT ALL
6. FEELING BAD ABOUT YOURSELF - OR THAT YOU ARE A FAILURE OR HAVE LET YOURSELF OR YOUR FAMILY DOWN: NOT AT ALL
SUM OF ALL RESPONSES TO PHQ QUESTIONS 1-9: 0
10. IF YOU CHECKED OFF ANY PROBLEMS, HOW DIFFICULT HAVE THESE PROBLEMS MADE IT FOR YOU TO DO YOUR WORK, TAKE CARE OF THINGS AT HOME, OR GET ALONG WITH OTHER PEOPLE: NOT DIFFICULT AT ALL
9. THOUGHTS THAT YOU WOULD BE BETTER OFF DEAD, OR OF HURTING YOURSELF: NOT AT ALL
SUM OF ALL RESPONSES TO PHQ9 QUESTIONS 1 & 2: 0
SUM OF ALL RESPONSES TO PHQ QUESTIONS 1-9: 0

## 2025-02-09 ASSESSMENT — LIFESTYLE VARIABLES
HOW MANY STANDARD DRINKS CONTAINING ALCOHOL DO YOU HAVE ON A TYPICAL DAY: 1
HOW OFTEN DO YOU HAVE A DRINK CONTAINING ALCOHOL: MONTHLY OR LESS
HOW MANY STANDARD DRINKS CONTAINING ALCOHOL DO YOU HAVE ON A TYPICAL DAY: 1 OR 2
HOW OFTEN DO YOU HAVE A DRINK CONTAINING ALCOHOL: 2
HOW OFTEN DO YOU HAVE SIX OR MORE DRINKS ON ONE OCCASION: 1

## 2025-02-10 ENCOUNTER — OFFICE VISIT (OUTPATIENT)
Dept: FAMILY MEDICINE CLINIC | Age: 70
End: 2025-02-10
Payer: MEDICARE

## 2025-02-10 VITALS
DIASTOLIC BLOOD PRESSURE: 60 MMHG | BODY MASS INDEX: 44.23 KG/M2 | TEMPERATURE: 97.2 F | WEIGHT: 281.8 LBS | HEART RATE: 79 BPM | HEIGHT: 67 IN | SYSTOLIC BLOOD PRESSURE: 120 MMHG | OXYGEN SATURATION: 95 % | RESPIRATION RATE: 18 BRPM

## 2025-02-10 DIAGNOSIS — R73.9 HYPERGLYCEMIA: ICD-10-CM

## 2025-02-10 DIAGNOSIS — E78.00 HYPERCHOLESTEREMIA: ICD-10-CM

## 2025-02-10 DIAGNOSIS — D68.2 DYSFIBRINOGENEMIA (HCC): ICD-10-CM

## 2025-02-10 DIAGNOSIS — Z00.00 MEDICARE ANNUAL WELLNESS VISIT, SUBSEQUENT: Primary | ICD-10-CM

## 2025-02-10 LAB
ALBUMIN SERPL BCG-MCNC: 4.5 G/DL (ref 3.5–5.1)
ALP SERPL-CCNC: 73 U/L (ref 38–126)
ALT SERPL W/O P-5'-P-CCNC: 21 U/L (ref 11–66)
ANION GAP SERPL CALC-SCNC: 10 MEQ/L (ref 8–16)
AST SERPL-CCNC: 17 U/L (ref 5–40)
BASOPHILS ABSOLUTE: 0.1 THOU/MM3 (ref 0–0.1)
BASOPHILS NFR BLD AUTO: 0.6 %
BILIRUB SERPL-MCNC: 0.3 MG/DL (ref 0.3–1.2)
BUN SERPL-MCNC: 26 MG/DL (ref 7–22)
CALCIUM SERPL-MCNC: 9.1 MG/DL (ref 8.5–10.5)
CHLORIDE SERPL-SCNC: 107 MEQ/L (ref 98–111)
CHOLEST SERPL-MCNC: 172 MG/DL (ref 100–199)
CO2 SERPL-SCNC: 25 MEQ/L (ref 23–33)
CREAT SERPL-MCNC: 1 MG/DL (ref 0.4–1.2)
DEPRECATED MEAN GLUCOSE BLD GHB EST-ACNC: 117 MG/DL (ref 70–126)
DEPRECATED RDW RBC AUTO: 42.9 FL (ref 35–45)
EOSINOPHIL NFR BLD AUTO: 1.7 %
EOSINOPHILS ABSOLUTE: 0.2 THOU/MM3 (ref 0–0.4)
ERYTHROCYTE [DISTWIDTH] IN BLOOD BY AUTOMATED COUNT: 12.4 % (ref 11.5–14.5)
GFR SERPL CREATININE-BSD FRML MDRD: 61 ML/MIN/1.73M2
GLUCOSE SERPL-MCNC: 85 MG/DL (ref 70–108)
HBA1C MFR BLD HPLC: 5.9 % (ref 4.4–6.4)
HCT VFR BLD AUTO: 43.1 % (ref 37–47)
HDLC SERPL-MCNC: 65 MG/DL
HGB BLD-MCNC: 14.1 GM/DL (ref 12–16)
IMM GRANULOCYTES # BLD AUTO: 0.04 THOU/MM3 (ref 0–0.07)
IMM GRANULOCYTES NFR BLD AUTO: 0.4 %
LDLC SERPL CALC-MCNC: 85 MG/DL
LYMPHOCYTES ABSOLUTE: 2.5 THOU/MM3 (ref 1–4.8)
LYMPHOCYTES NFR BLD AUTO: 27.6 %
MCH RBC QN AUTO: 30.9 PG (ref 26–33)
MCHC RBC AUTO-ENTMCNC: 32.7 GM/DL (ref 32.2–35.5)
MCV RBC AUTO: 94.5 FL (ref 81–99)
MONOCYTES ABSOLUTE: 0.7 THOU/MM3 (ref 0.4–1.3)
MONOCYTES NFR BLD AUTO: 7.8 %
NEUTROPHILS ABSOLUTE: 5.6 THOU/MM3 (ref 1.8–7.7)
NEUTROPHILS NFR BLD AUTO: 61.9 %
NRBC BLD AUTO-RTO: 0 /100 WBC
PLATELET # BLD AUTO: 252 THOU/MM3 (ref 130–400)
PMV BLD AUTO: 10.5 FL (ref 9.4–12.4)
POTASSIUM SERPL-SCNC: 4.6 MEQ/L (ref 3.5–5.2)
PROT SERPL-MCNC: 6.7 G/DL (ref 6.1–8)
RBC # BLD AUTO: 4.56 MILL/MM3 (ref 4.2–5.4)
SODIUM SERPL-SCNC: 142 MEQ/L (ref 135–145)
TRIGL SERPL-MCNC: 109 MG/DL (ref 0–199)
WBC # BLD AUTO: 9.1 THOU/MM3 (ref 4.8–10.8)

## 2025-02-10 PROCEDURE — 1123F ACP DISCUSS/DSCN MKR DOCD: CPT | Performed by: FAMILY MEDICINE

## 2025-02-10 PROCEDURE — G0439 PPPS, SUBSEQ VISIT: HCPCS | Performed by: FAMILY MEDICINE

## 2025-02-10 PROCEDURE — 1159F MED LIST DOCD IN RCRD: CPT | Performed by: FAMILY MEDICINE

## 2025-02-10 PROCEDURE — 1160F RVW MEDS BY RX/DR IN RCRD: CPT | Performed by: FAMILY MEDICINE

## 2025-02-10 PROCEDURE — 3017F COLORECTAL CA SCREEN DOC REV: CPT | Performed by: FAMILY MEDICINE

## 2025-02-10 RX ORDER — ROSUVASTATIN CALCIUM 10 MG/1
10 TABLET, COATED ORAL NIGHTLY
Qty: 90 TABLET | Refills: 3 | Status: SHIPPED | OUTPATIENT
Start: 2025-02-10

## 2025-02-10 NOTE — PROGRESS NOTES
Medicare Annual Wellness Visit    Swathi Nelson is here for Medicare AWV (refills)    Assessment & Plan   Medicare annual wellness visit, subsequent  Hypercholesteremia  -     rosuvastatin (CRESTOR) 10 MG tablet; Take 1 tablet by mouth nightly, Disp-90 tablet, R-3Normal  -     Lipid Panel; Future  -     CBC with Auto Differential; Future  -     Comprehensive Metabolic Panel; Future  -stable, controlled on crestor, serial lipids,   Lab Results   Component Value Date    CHOL 182 02/08/2024    TRIG 183 02/08/2024    HDL 57 02/08/2024    LDL 88 02/08/2024    VLDL 23 05/11/2015    CHOLHDLRATIO 4.6 05/11/2015       Dysfibrinogenemia (HCC)  -stable, follows with specialist  Hyperglycemia  -     Hemoglobin A1C; Future  -stable, serial labs    Encouraged diet, exercise and weight loss.  Reviewed health maintenance       Return in 1 year (on 2/10/2026) for Medicare AWV.     Subjective       Patient's complete Health Risk Assessment and screening values have been reviewed and are found in Flowsheets. The following problems were reviewed today and where indicated follow up appointments were made and/or referrals ordered.    Positive Risk Factor Screenings with Interventions:                Abnormal BMI (obese):  Body mass index is 44.8 kg/m². (!) Abnormal  Interventions:  See AVS for additional education material                           Objective   Vitals:    02/10/25 0806   BP: 120/60   Pulse: 79   Resp: 18   Temp: 97.2 °F (36.2 °C)   TempSrc: Infrared   SpO2: 95%   Weight: 127.8 kg (281 lb 12.8 oz)   Height: 1.689 m (5' 6.5\")      Body mass index is 44.8 kg/m².      General Appearance: alert and oriented to person, place and time, well developed and well- nourished, in no acute distress  Skin: warm and dry, no rash or erythema  Head: normocephalic and atraumatic  Eyes: pupils equal, round, and reactive to light, extraocular eye movements intact, conjunctivae normal  ENT: tympanic membrane, external ear and ear canal normal

## 2025-02-11 ENCOUNTER — TELEPHONE (OUTPATIENT)
Dept: FAMILY MEDICINE CLINIC | Age: 70
End: 2025-02-11

## 2025-02-11 NOTE — TELEPHONE ENCOUNTER
----- Message from Dr. Bigg Gracia MD sent at 2/11/2025  6:43 AM EST -----  CMP is good.  A1C is stable at 5.9.  CBC is normal.  Cholesterol at 172 with normal cardiac risk.  Labs are good, continue present.

## 2025-03-07 ENCOUNTER — HOSPITAL ENCOUNTER (OUTPATIENT)
Dept: WOMENS IMAGING | Age: 70
Discharge: HOME | End: 2025-03-07
Payer: MEDICARE

## 2025-03-07 VITALS — BODY MASS INDEX: 42.85 KG/M2 | HEIGHT: 67 IN | WEIGHT: 273 LBS

## 2025-03-07 DIAGNOSIS — Z12.31 ENCOUNTER FOR SCREENING MAMMOGRAM FOR MALIGNANT NEOPLASM OF BREAST: ICD-10-CM

## 2025-03-07 PROCEDURE — 77063 BREAST TOMOSYNTHESIS BI: CPT

## (undated) DEVICE — CATHETER ETER IV 22GA L1IN POLYUR STR RADPQ INTROCAN SFTY

## (undated) DEVICE — DEFENDO AIR WATER SUCTION AND BIOPSY VALVE KIT FOR  OLYMPUS: Brand: DEFENDO AIR/WATER/SUCTION AND BIOPSY VALVE

## (undated) DEVICE — ENDO KIT: Brand: MEDLINE INDUSTRIES, INC.

## (undated) DEVICE — CANISTER, RIGID, 2000CC: Brand: MEDLINE INDUSTRIES, INC.

## (undated) DEVICE — FORCEPS BX L240CM JAW DIA3.2MM L CAP W/ NDL MIC MESH TOOTH

## (undated) DEVICE — SET ADMIN 25ML L117IN PMP MOD CK VLV RLER CLMP 2 SMRTSITE

## (undated) DEVICE — SOLUTION IV 1000ML 0.45% SOD CHL PH 5 INJ USP VIAFLX PLAS

## (undated) DEVICE — SET LNR RED GRN W/ BASE CLEANASCOPE

## (undated) DEVICE — TUBING IV STOPCOCK 48 CM 3 W

## (undated) DEVICE — IV START KIT: Brand: MEDLINE INDUSTRIES, INC.